# Patient Record
Sex: MALE | Race: BLACK OR AFRICAN AMERICAN | Employment: OTHER | URBAN - METROPOLITAN AREA
[De-identification: names, ages, dates, MRNs, and addresses within clinical notes are randomized per-mention and may not be internally consistent; named-entity substitution may affect disease eponyms.]

---

## 2018-02-08 ENCOUNTER — APPOINTMENT (OUTPATIENT)
Dept: GENERAL RADIOLOGY | Age: 83
DRG: 291 | End: 2018-02-08
Attending: EMERGENCY MEDICINE
Payer: MEDICARE

## 2018-02-08 ENCOUNTER — HOSPITAL ENCOUNTER (INPATIENT)
Age: 83
LOS: 5 days | Discharge: HOME HEALTH | DRG: 291 | End: 2018-02-13
Attending: EMERGENCY MEDICINE | Admitting: FAMILY MEDICINE
Payer: MEDICARE

## 2018-02-08 ENCOUNTER — APPOINTMENT (OUTPATIENT)
Dept: CT IMAGING | Age: 83
DRG: 291 | End: 2018-02-08
Attending: EMERGENCY MEDICINE
Payer: MEDICARE

## 2018-02-08 DIAGNOSIS — I50.9 ACUTE ON CHRONIC CONGESTIVE HEART FAILURE, UNSPECIFIED CONGESTIVE HEART FAILURE TYPE: Primary | ICD-10-CM

## 2018-02-08 DIAGNOSIS — R06.02 SOB (SHORTNESS OF BREATH): ICD-10-CM

## 2018-02-08 DIAGNOSIS — R53.81 PHYSICAL DEBILITY: ICD-10-CM

## 2018-02-08 DIAGNOSIS — Z71.89 GOALS OF CARE, COUNSELING/DISCUSSION: ICD-10-CM

## 2018-02-08 DIAGNOSIS — R41.3 MEMORY DIFFICULTIES: ICD-10-CM

## 2018-02-08 DIAGNOSIS — R05.9 COUGH: ICD-10-CM

## 2018-02-08 LAB
ALBUMIN SERPL-MCNC: 3.4 G/DL (ref 3.5–5)
ALBUMIN/GLOB SERPL: 0.8 {RATIO} (ref 1.1–2.2)
ALP SERPL-CCNC: 121 U/L (ref 45–117)
ALT SERPL-CCNC: 110 U/L (ref 12–78)
ANION GAP SERPL CALC-SCNC: 11 MMOL/L (ref 5–15)
ARTERIAL PATENCY WRIST A: ABNORMAL
AST SERPL-CCNC: 105 U/L (ref 15–37)
ATRIAL RATE: 120 BPM
ATRIAL RATE: 19 BPM
BASE EXCESS BLD CALC-SCNC: 4 MMOL/L
BASOPHILS # BLD: 0 K/UL (ref 0–0.1)
BASOPHILS NFR BLD: 0 % (ref 0–1)
BDY SITE: ABNORMAL
BILIRUB SERPL-MCNC: 1.5 MG/DL (ref 0.2–1)
BNP SERPL-MCNC: ABNORMAL PG/ML (ref 0–450)
BUN SERPL-MCNC: 28 MG/DL (ref 6–20)
BUN/CREAT SERPL: 18 (ref 12–20)
CALCIUM SERPL-MCNC: 8 MG/DL (ref 8.5–10.1)
CALCULATED R AXIS, ECG10: -20 DEGREES
CALCULATED R AXIS, ECG10: -21 DEGREES
CALCULATED T AXIS, ECG11: 102 DEGREES
CALCULATED T AXIS, ECG11: 126 DEGREES
CHLORIDE SERPL-SCNC: 106 MMOL/L (ref 97–108)
CHOLEST SERPL-MCNC: 116 MG/DL
CO2 SERPL-SCNC: 29 MMOL/L (ref 21–32)
CREAT SERPL-MCNC: 1.56 MG/DL (ref 0.7–1.3)
D DIMER PPP FEU-MCNC: 7.8 MG/L FEU (ref 0–0.65)
DIAGNOSIS, 93000: NORMAL
DIAGNOSIS, 93000: NORMAL
DIFFERENTIAL METHOD BLD: ABNORMAL
EOSINOPHIL # BLD: 0 K/UL (ref 0–0.4)
EOSINOPHIL NFR BLD: 0 % (ref 0–7)
ERYTHROCYTE [DISTWIDTH] IN BLOOD BY AUTOMATED COUNT: 16.6 % (ref 11.5–14.5)
FLUAV AG NPH QL IA: NEGATIVE
FLUBV AG NOSE QL IA: NEGATIVE
GAS FLOW.O2 O2 DELIVERY SYS: ABNORMAL L/MIN
GLOBULIN SER CALC-MCNC: 4.4 G/DL (ref 2–4)
GLUCOSE SERPL-MCNC: 112 MG/DL (ref 65–100)
HCO3 BLD-SCNC: 26.9 MMOL/L (ref 22–26)
HCT VFR BLD AUTO: 36.1 % (ref 36.6–50.3)
HDLC SERPL-MCNC: 35 MG/DL
HDLC SERPL: 3.3 {RATIO} (ref 0–5)
HGB BLD-MCNC: 11.9 G/DL (ref 12.1–17)
IMM GRANULOCYTES # BLD: 0.1 K/UL (ref 0–0.04)
IMM GRANULOCYTES NFR BLD AUTO: 1 % (ref 0–0.5)
LDLC SERPL CALC-MCNC: 63.6 MG/DL (ref 0–100)
LIPID PROFILE,FLP: NORMAL
LYMPHOCYTES # BLD: 0.7 K/UL (ref 0.8–3.5)
LYMPHOCYTES NFR BLD: 9 % (ref 12–49)
MAGNESIUM SERPL-MCNC: 1.7 MG/DL (ref 1.6–2.4)
MAGNESIUM SERPL-MCNC: 2 MG/DL (ref 1.6–2.4)
MCH RBC QN AUTO: 32.7 PG (ref 26–34)
MCHC RBC AUTO-ENTMCNC: 33 G/DL (ref 30–36.5)
MCV RBC AUTO: 99.2 FL (ref 80–99)
MONOCYTES # BLD: 0.7 K/UL (ref 0–1)
MONOCYTES NFR BLD: 9 % (ref 5–13)
NEUTS SEG # BLD: 6.3 K/UL (ref 1.8–8)
NEUTS SEG NFR BLD: 81 % (ref 32–75)
NRBC # BLD: 0.04 K/UL (ref 0–0.01)
NRBC BLD-RTO: 0.5 PER 100 WBC
PCO2 BLD: 33.1 MMHG (ref 35–45)
PH BLD: 7.52 [PH] (ref 7.35–7.45)
PLATELET # BLD AUTO: 175 K/UL (ref 150–400)
PLATELET COMMENTS,PCOM: ABNORMAL
PMV BLD AUTO: 12.3 FL (ref 8.9–12.9)
PO2 BLD: 66 MMHG (ref 80–100)
POTASSIUM SERPL-SCNC: 3.2 MMOL/L (ref 3.5–5.1)
PROT SERPL-MCNC: 7.8 G/DL (ref 6.4–8.2)
Q-T INTERVAL, ECG07: 296 MS
Q-T INTERVAL, ECG07: 336 MS
QRS DURATION, ECG06: 74 MS
QRS DURATION, ECG06: 78 MS
QTC CALCULATION (BEZET), ECG08: 416 MS
QTC CALCULATION (BEZET), ECG08: 478 MS
RBC # BLD AUTO: 3.64 M/UL (ref 4.1–5.7)
RBC MORPH BLD: ABNORMAL
SAO2 % BLD: 95 % (ref 92–97)
SODIUM SERPL-SCNC: 146 MMOL/L (ref 136–145)
SPECIMEN TYPE: ABNORMAL
TRIGL SERPL-MCNC: 87 MG/DL (ref ?–150)
TROPONIN I SERPL-MCNC: 0.1 NG/ML
TROPONIN I SERPL-MCNC: 0.11 NG/ML
TROPONIN I SERPL-MCNC: 0.32 NG/ML
TSH SERPL DL<=0.05 MIU/L-ACNC: 0.81 UIU/ML (ref 0.36–3.74)
VENTRICULAR RATE, ECG03: 119 BPM
VENTRICULAR RATE, ECG03: 122 BPM
VLDLC SERPL CALC-MCNC: 17.4 MG/DL
WBC # BLD AUTO: 7.8 K/UL (ref 4.1–11.1)

## 2018-02-08 PROCEDURE — 76450000000

## 2018-02-08 PROCEDURE — 99285 EMERGENCY DEPT VISIT HI MDM: CPT

## 2018-02-08 PROCEDURE — 83735 ASSAY OF MAGNESIUM: CPT | Performed by: FAMILY MEDICINE

## 2018-02-08 PROCEDURE — 74011250636 HC RX REV CODE- 250/636: Performed by: EMERGENCY MEDICINE

## 2018-02-08 PROCEDURE — 74011000250 HC RX REV CODE- 250: Performed by: EMERGENCY MEDICINE

## 2018-02-08 PROCEDURE — 71045 X-RAY EXAM CHEST 1 VIEW: CPT

## 2018-02-08 PROCEDURE — 94640 AIRWAY INHALATION TREATMENT: CPT

## 2018-02-08 PROCEDURE — 80053 COMPREHEN METABOLIC PANEL: CPT | Performed by: EMERGENCY MEDICINE

## 2018-02-08 PROCEDURE — 74011250637 HC RX REV CODE- 250/637: Performed by: NURSE PRACTITIONER

## 2018-02-08 PROCEDURE — 74011000258 HC RX REV CODE- 258: Performed by: NURSE PRACTITIONER

## 2018-02-08 PROCEDURE — 74011000250 HC RX REV CODE- 250: Performed by: NURSE PRACTITIONER

## 2018-02-08 PROCEDURE — 83880 ASSAY OF NATRIURETIC PEPTIDE: CPT | Performed by: EMERGENCY MEDICINE

## 2018-02-08 PROCEDURE — 74011250636 HC RX REV CODE- 250/636: Performed by: NURSE PRACTITIONER

## 2018-02-08 PROCEDURE — 85379 FIBRIN DEGRADATION QUANT: CPT | Performed by: EMERGENCY MEDICINE

## 2018-02-08 PROCEDURE — 84443 ASSAY THYROID STIM HORMONE: CPT | Performed by: FAMILY MEDICINE

## 2018-02-08 PROCEDURE — 80061 LIPID PANEL: CPT | Performed by: FAMILY MEDICINE

## 2018-02-08 PROCEDURE — 84484 ASSAY OF TROPONIN QUANT: CPT | Performed by: EMERGENCY MEDICINE

## 2018-02-08 PROCEDURE — 96374 THER/PROPH/DIAG INJ IV PUSH: CPT

## 2018-02-08 PROCEDURE — 82803 BLOOD GASES ANY COMBINATION: CPT

## 2018-02-08 PROCEDURE — 36600 WITHDRAWAL OF ARTERIAL BLOOD: CPT

## 2018-02-08 PROCEDURE — 83735 ASSAY OF MAGNESIUM: CPT | Performed by: NURSE PRACTITIONER

## 2018-02-08 PROCEDURE — 93005 ELECTROCARDIOGRAM TRACING: CPT

## 2018-02-08 PROCEDURE — 65660000000 HC RM CCU STEPDOWN

## 2018-02-08 PROCEDURE — 70450 CT HEAD/BRAIN W/O DYE: CPT

## 2018-02-08 PROCEDURE — 36415 COLL VENOUS BLD VENIPUNCTURE: CPT | Performed by: FAMILY MEDICINE

## 2018-02-08 PROCEDURE — 93306 TTE W/DOPPLER COMPLETE: CPT

## 2018-02-08 PROCEDURE — 77030029684 HC NEB SM VOL KT MONA -A

## 2018-02-08 PROCEDURE — 85025 COMPLETE CBC W/AUTO DIFF WBC: CPT | Performed by: EMERGENCY MEDICINE

## 2018-02-08 PROCEDURE — 87804 INFLUENZA ASSAY W/OPTIC: CPT | Performed by: FAMILY MEDICINE

## 2018-02-08 PROCEDURE — 74011000250 HC RX REV CODE- 250: Performed by: FAMILY MEDICINE

## 2018-02-08 RX ORDER — IPRATROPIUM BROMIDE AND ALBUTEROL SULFATE 2.5; .5 MG/3ML; MG/3ML
3 SOLUTION RESPIRATORY (INHALATION)
Status: DISCONTINUED | OUTPATIENT
Start: 2018-02-08 | End: 2018-02-11

## 2018-02-08 RX ORDER — SODIUM CHLORIDE 0.9 % (FLUSH) 0.9 %
5-10 SYRINGE (ML) INJECTION AS NEEDED
Status: DISCONTINUED | OUTPATIENT
Start: 2018-02-08 | End: 2018-02-13 | Stop reason: HOSPADM

## 2018-02-08 RX ORDER — GUAIFENESIN 100 MG/5ML
81 LIQUID (ML) ORAL DAILY
COMMUNITY

## 2018-02-08 RX ORDER — LANOLIN ALCOHOL/MO/W.PET/CERES
1000 CREAM (GRAM) TOPICAL DAILY
COMMUNITY

## 2018-02-08 RX ORDER — ATORVASTATIN CALCIUM 80 MG/1
40 TABLET, FILM COATED ORAL DAILY
COMMUNITY

## 2018-02-08 RX ORDER — FINASTERIDE 5 MG/1
5 TABLET, FILM COATED ORAL DAILY
COMMUNITY

## 2018-02-08 RX ORDER — FUROSEMIDE 10 MG/ML
80 INJECTION INTRAMUSCULAR; INTRAVENOUS
Status: COMPLETED | OUTPATIENT
Start: 2018-02-08 | End: 2018-02-08

## 2018-02-08 RX ORDER — CARVEDILOL 6.25 MG/1
6.25 TABLET ORAL 2 TIMES DAILY
Status: DISCONTINUED | OUTPATIENT
Start: 2018-02-08 | End: 2018-02-09

## 2018-02-08 RX ORDER — LISINOPRIL 10 MG/1
10 TABLET ORAL DAILY
COMMUNITY
End: 2018-02-13

## 2018-02-08 RX ORDER — TAMSULOSIN HYDROCHLORIDE 0.4 MG/1
0.4 CAPSULE ORAL DAILY
COMMUNITY

## 2018-02-08 RX ORDER — FINASTERIDE 5 MG/1
5 TABLET, FILM COATED ORAL DAILY
Status: DISCONTINUED | OUTPATIENT
Start: 2018-02-09 | End: 2018-02-13 | Stop reason: HOSPADM

## 2018-02-08 RX ORDER — METOPROLOL SUCCINATE 25 MG/1
25 TABLET, EXTENDED RELEASE ORAL DAILY
Status: ON HOLD | COMMUNITY
End: 2018-02-13

## 2018-02-08 RX ORDER — FUROSEMIDE 40 MG/1
60 TABLET ORAL 2 TIMES DAILY
COMMUNITY
End: 2018-02-13

## 2018-02-08 RX ORDER — SODIUM CHLORIDE 0.9 % (FLUSH) 0.9 %
5-10 SYRINGE (ML) INJECTION EVERY 8 HOURS
Status: DISCONTINUED | OUTPATIENT
Start: 2018-02-08 | End: 2018-02-13 | Stop reason: HOSPADM

## 2018-02-08 RX ORDER — ATORVASTATIN CALCIUM 40 MG/1
40 TABLET, FILM COATED ORAL DAILY
Status: DISCONTINUED | OUTPATIENT
Start: 2018-02-09 | End: 2018-02-13 | Stop reason: HOSPADM

## 2018-02-08 RX ORDER — POTASSIUM CHLORIDE 750 MG/1
20 TABLET, FILM COATED, EXTENDED RELEASE ORAL ONCE
Status: COMPLETED | OUTPATIENT
Start: 2018-02-08 | End: 2018-02-08

## 2018-02-08 RX ORDER — MAGNESIUM SULFATE HEPTAHYDRATE 40 MG/ML
2 INJECTION, SOLUTION INTRAVENOUS ONCE
Status: COMPLETED | OUTPATIENT
Start: 2018-02-08 | End: 2018-02-08

## 2018-02-08 RX ORDER — TAMSULOSIN HYDROCHLORIDE 0.4 MG/1
0.4 CAPSULE ORAL DAILY
Status: DISCONTINUED | OUTPATIENT
Start: 2018-02-09 | End: 2018-02-13 | Stop reason: HOSPADM

## 2018-02-08 RX ORDER — ACETAMINOPHEN 325 MG/1
650 TABLET ORAL
Status: DISCONTINUED | OUTPATIENT
Start: 2018-02-08 | End: 2018-02-13 | Stop reason: HOSPADM

## 2018-02-08 RX ORDER — POTASSIUM CHLORIDE 750 MG/1
20 TABLET, FILM COATED, EXTENDED RELEASE ORAL
Status: COMPLETED | OUTPATIENT
Start: 2018-02-08 | End: 2018-02-08

## 2018-02-08 RX ORDER — GUAIFENESIN 100 MG/5ML
81 LIQUID (ML) ORAL DAILY
Status: DISCONTINUED | OUTPATIENT
Start: 2018-02-09 | End: 2018-02-13 | Stop reason: HOSPADM

## 2018-02-08 RX ORDER — BUMETANIDE 0.25 MG/ML
2 INJECTION INTRAMUSCULAR; INTRAVENOUS EVERY 12 HOURS
Status: DISCONTINUED | OUTPATIENT
Start: 2018-02-08 | End: 2018-02-10

## 2018-02-08 RX ORDER — LOSARTAN POTASSIUM 50 MG/1
50 TABLET ORAL DAILY
Status: DISCONTINUED | OUTPATIENT
Start: 2018-02-08 | End: 2018-02-09

## 2018-02-08 RX ORDER — NITROGLYCERIN 0.4 MG/1
0.4 TABLET SUBLINGUAL
Status: DISCONTINUED | OUTPATIENT
Start: 2018-02-08 | End: 2018-02-13 | Stop reason: HOSPADM

## 2018-02-08 RX ADMIN — MAGNESIUM SULFATE HEPTAHYDRATE 2 G: 40 INJECTION, SOLUTION INTRAVENOUS at 15:32

## 2018-02-08 RX ADMIN — FUROSEMIDE 80 MG: 10 INJECTION, SOLUTION INTRAVENOUS at 12:59

## 2018-02-08 RX ADMIN — ALBUTEROL SULFATE 1 DOSE: 2.5 SOLUTION RESPIRATORY (INHALATION) at 12:03

## 2018-02-08 RX ADMIN — DILTIAZEM HYDROCHLORIDE 10 MG/HR: 5 INJECTION INTRAVENOUS at 17:56

## 2018-02-08 RX ADMIN — POTASSIUM CHLORIDE 20 MEQ: 750 TABLET, FILM COATED, EXTENDED RELEASE ORAL at 15:32

## 2018-02-08 RX ADMIN — Medication 10 ML: at 22:00

## 2018-02-08 RX ADMIN — DILTIAZEM HYDROCHLORIDE 10 MG/HR: 5 INJECTION INTRAVENOUS at 15:25

## 2018-02-08 RX ADMIN — IPRATROPIUM BROMIDE AND ALBUTEROL SULFATE 3 ML: .5; 3 SOLUTION RESPIRATORY (INHALATION) at 20:52

## 2018-02-08 RX ADMIN — ALBUTEROL SULFATE 1 DOSE: 2.5 SOLUTION RESPIRATORY (INHALATION) at 12:04

## 2018-02-08 RX ADMIN — ALBUTEROL SULFATE 1 DOSE: 2.5 SOLUTION RESPIRATORY (INHALATION) at 11:28

## 2018-02-08 RX ADMIN — LOSARTAN POTASSIUM 50 MG: 50 TABLET ORAL at 15:32

## 2018-02-08 RX ADMIN — BUMETANIDE 2 MG: 0.25 INJECTION INTRAMUSCULAR; INTRAVENOUS at 20:36

## 2018-02-08 RX ADMIN — CARVEDILOL 6.25 MG: 3.12 TABLET, FILM COATED ORAL at 15:32

## 2018-02-08 RX ADMIN — POTASSIUM CHLORIDE 20 MEQ: 750 TABLET, EXTENDED RELEASE ORAL at 17:56

## 2018-02-08 NOTE — H&P
[address]  ACUTE CARE HISTORY AND PHYSICAL    Reva Mendoza  MR#: [id number]  : 1931  ACCOUNT #: [de-identified]   DATE OF SERVICE: 2018    CHIEF COMPLAINT:  Shortness of breath. HISTORY OF PRESENT ILLNESS:  The patient is an 80-year-old male with past medical history of CHF, hypertension, DVT, AAA, atherosclerosis, right inguinal hernia, cholelithiasis, BPH, who presents to the hospital complaining of the above-mentioned symptoms. The patient is a poor historian and not much history could be obtained from the patient. History relied on previous notes from [facility] as well as from the ER physician. Apparently the patient came in today complaining of shortness of breath that has gotten worse since last night. Per EMS note, the patient's wife called 911 due to complaints of chest pain and shortness of breath through the night. The patient was admitted at [facility] on 2018 and left AMA yesterday. The patient seemed more somnolent today per EMS, and the wife felt that the patient's symptoms had gotten worse since he came back from [facility]. The patient has been taking diuretics per the ER physician, but has not had much relief in shortness of breath. The patient is not on any anticoagulation per wife because apparently she did not want the patient to be anticoagulation. The patient is currently resting in chair. Denies any headache, blurry vision, sore throat, trouble swallowing, trouble with speech. Reports that the chest pain has gone away. Does admit to some shortness of breath. Denies cough. Does admit to orthopnea. Denies any abdominal pain, constipation, diarrhea, urinary symptoms, focal or generalized neurological weakness, recent travel, sick contacts or any other concerns or problems. The patient denies any hematemesis, melena or hemoptysis. The patient's last ejection fraction was around 25% on  done at [facility] this week. PAST MEDICAL HISTORY:  See above. HOME MEDICATIONS:  It is unclear what the patient's home medication is. Per the list at our facility, the patient is on aspirin 81 mg every day, Lipitor 80 mg every day, Proscar 5 mg every day, furosemide 40 mg every day, lisinopril 10 mg every day, metoprolol 25 mg every day, and tamsulosin 0.4 mg every day, but I am not sure if this is the correct list.  The patient does not remember his medication. SOCIAL HISTORY:  The patient denies any history of tobacco abuse, alcohol use, IV drug abuse. Lives at home. FAMILY HISTORY:  The patient reports that his mother had a history of heart problems. REVIEW OF SYSTEMS:  All systems were reviewed and were found to be essentially negative except for those symptoms mentioned above. ALLERGIES:  NO KNOWN DRUG ALLERGIES. CODE STATUS:  FULL. PHYSICAL EXAMINATION:  VITAL SIGNS:  Temperature 98.4, pulse 102, respiratory rate , blood pressure 125/50, . GENERAL:  Alert x2, awake, debilitated male, appears to be stated age. HEENT:  Pupils equal and reactive to light. Dry mucus membranes. Tympanic membranes clear. NECK:  Supple. CHEST:  Bilateral crackles, right greater than left. HEART:  Irregularly irregular, tachycardic. ABDOMEN:  Soft, nontender, nondistended. Bowel wounds are physiological.  EXTREMITIES:  No clubbing, no cyanosis. 1+ pitting edema bilateral lower extremities. NEUROPSYCHIATRIC:  Pleasant mood and affect. Cranial nerves II-XII appear to be grossly intact. Sensory grossly within normal limits. DTR 2+ . Strength 5/5. SKIN:  Warm. LABORATORY DATA:  White count 7.8, hemoglobin 11.9, hematocrit 36.1, platelets 395. Sodium, D-dimer 7.80, potassium 3.2, chloride 106, bicarb 29, anion gap 11, BUN 28, creatinine 1.56, calcium 8.0, bilirubin total 1.5. Troponin 0.10. , , bilirubin total 1.5. CT of the head shows no acute abnormality. EKG shows AFib with RVR, nonspecific ST changes.     ASSESSMENT AND PLAN:  1. CHF exacerbation. The patient will be admitted on a telemetry bed. Appreciate cardiology input. Cardiology started the patient on Bumex 2 mg IV q.12 hours, strict I's and O's, daily weights, nitroglycerin p.r.n., Coreg 6.25 mg b.i.d., and losartan. A repeat echocardiogram has been ordered by cardiology, which will be evaluated. Troponins have been ordered. Further intervention will be per hospital course. The patient is NYHA class 3-4 on admission. 2.  AFib with RVR. The patient on diltiazem drip. We will continue to closely monitor on telemetry bed. Coreg started. The patient needs oral anticoagulation, but the cardiologist spoke to the patient's wife and, per cardiology, she refused all anticoagulation. Currently on aspirin. Continue to closely monitor. 3.  Chest pain. Mildly elevated troponin, most likely secondary to demand ischemia along with AFib with RVR and elevated creatinine. We will trend. Continue aspirin and statin and continue to monitor. 4.  Hypertension. Continue home medication. 5.  CKD stage III. Monitor for now, especially in light of diuretics. 6.  History of AAA. Monitor. 7.  Hypokalemia. Replace potassium. 8.  Elevated liver enzymes, questionable cardiac cirrhosis. I will get a right upper quadrant ultrasound. The patient nontender in the right upper quadrant. No signs of liver disease. Continue to closely monitor. Repeat labs in the morning. 9.  GI and DVT prophylaxis. The patient will be on SCDs.       Duarte Henning MD MM / Kade Dunbar  D: 02/08/2018 16:53     T: 02/08/2018 17:47  JOB #: [id number]

## 2018-02-08 NOTE — PROGRESS NOTES
Admission Medication Reconciliation:    Information obtained from:  [facility], patient's wife, chart review    Comments/Recommendations: Updated PTA meds/reviewed patient's allergies. 1)  Medications added: aspirin, atorvastatin, cyanocobalamin, finasteride, furosemide, lisinopril, metoprolol succinate, MVI w/minerals, tamsulosin    2)  Medications deleted: none    3)  Medications changed: none    4)  Of note: patient medication list obtained from the [place]. Per his wife, he has not taken any medication since Tuesday. He was recently discharged from [address] [place] [address], but she did not have the discharge paperwork with her. Significant PMH/Disease States:   Past Medical History:   Diagnosis Date    Arthritis     Heart failure ([address]     Hypertension        Chief Complaint for this Admission:    Chief Complaint   Patient presents with    Chest Pain    Shortness of Breath       Allergies:  Review of patient's allergies indicates no known allergies. Prior to Admission Medications:   Prior to Admission Medications   Prescriptions Last Dose Informant Patient Reported? Taking? MULTIVIT WITH IRON,MINERALS (MULTIVITAMIN AND MINERALS PO) 2/6/2018  Yes Yes   Sig: Take 1 Tab by mouth daily. aspirin 81 mg chewable tablet 2/6/2018  Yes Yes   Sig: Take 81 mg by mouth daily. atorvastatin (LIPITOR) 80 mg tablet 2/6/2018  Yes Yes   Sig: Take 40 mg by mouth daily. cyanocobalamin 1,000 mcg tablet 2/6/2018  Yes Yes   Sig: Take 1,000 mcg by mouth daily. finasteride (PROSCAR) 5 mg tablet 2/6/2018  Yes Yes   Sig: Take 5 mg by mouth daily. furosemide (LASIX) 40 mg tablet 2/6/2018  Yes Yes   Sig: Take 60 mg by mouth two (2) times a day. lisinopril (PRINIVIL, ZESTRIL) 10 mg tablet 2/6/2018  Yes Yes   Sig: Take 10 mg by mouth daily. metoprolol succinate (TOPROL XL) 25 mg XL tablet 2/6/2018  Yes Yes   Sig: Take 25 mg by mouth daily. tamsulosin (FLOMAX) 0.4 mg capsule 2/6/2018  Yes Yes   Sig: Take 0.4 mg by mouth daily. Facility-Administered Medications: None     Thank you for allowing me to participate in the care of this patient. Please contact the medication reconciliation pharmacist () with any questions.       Brandon Huber, PharmD Candidate [id number]

## 2018-02-08 NOTE — IP AVS SNAPSHOT
[address] 
[phone] Patient: Carly Olson MRN: [id number] FDA:[id number]/07/3974 A check dana indicates which time of day the medication should be taken. My Medications START taking these medications Instructions Each Dose to Equal  
 Morning Noon Evening Bedtime  
 bumetanide 1 mg tablet Commonly known as:  [place] Sayirasema Your last dose was: Your next dose is: Take 1 Tab by mouth two (2) times a day. 1 mg  
    
   
   
   
  
 losartan 25 mg tablet Commonly known as:  COZAAR Start taking on:  2/14/2018 Your last dose was: Your next dose is: Take 0.5 Tabs by mouth daily. 12.5 mg  
    
   
   
   
  
  
CHANGE how you take these medications Instructions Each Dose to Equal  
 Morning Noon Evening Bedtime  
 metoprolol succinate 50 mg XL tablet Commonly known as:  TOPROL XL What changed:   
- medication strength 
- how much to take Your last dose was: Your next dose is: Take 1 Tab by mouth daily. 50 mg CONTINUE taking these medications Instructions Each Dose to Equal  
 Morning Noon Evening Bedtime  
 aspirin 81 mg chewable tablet Your last dose was: Your next dose is: Take 81 mg by mouth daily. 81 mg  
    
   
   
   
  
 atorvastatin 80 mg tablet Commonly known as:  LIPITOR Your last dose was: Your next dose is: Take 40 mg by mouth daily. 40 mg  
    
   
   
   
  
 cyanocobalamin 1,000 mcg tablet Your last dose was: Your next dose is: Take 1,000 mcg by mouth daily. 1000 mcg  
    
   
   
   
  
 finasteride 5 mg tablet Commonly known as:  PROSCAR Your last dose was: Your next dose is: Take 5 mg by mouth daily. 5 mg MULTIVITAMIN AND MINERALS PO Your last dose was: Your next dose is: Take 1 Tab by mouth daily. 1 Tab  
    
   
   
   
  
 tamsulosin 0.4 mg capsule Commonly known as:  FLOMAX Your last dose was: Your next dose is: Take 0.4 mg by mouth daily. 0.4 mg  
    
   
   
   
  
  
STOP taking these medications   
 furosemide 40 mg tablet Commonly known as:  LASIX  
   
  
 lisinopril 10 mg tablet Commonly known as:  Mohan Spencer Where to Get Your Medications Information on where to get these meds will be given to you by the nurse or doctor. ! Ask your nurse or doctor about these medications  
  bumetanide 1 mg tablet  
 losartan 25 mg tablet  
 metoprolol succinate 50 mg XL tablet

## 2018-02-08 NOTE — IP AVS SNAPSHOT
[address] 
[phone] Patient: Joselin Bobo MRN: [id number] RQC:[id number] About your hospitalization You were admitted on:  February 8, 2018 You last received care in the:  [facility] [place] [facility] CV SERVICES UNIT You were discharged on:  February 13, 2018 Why you were hospitalized Your primary diagnosis was:  Sob (Shortness Of Breath) Follow-up Information Follow up With Details Comments Contact Info Charley Villareal NP On 2/16/2018 11:20 AM [address] [place] [address] 
[phone] Dr Donny Alvarez at [facility] In 1 week Discharge follow up Phys Other, MD   Patient can only remember the practice name and not the physician [address] On 2/14/2018 reza hayward and PT/ [place] [address] 
[phone] Discharge Orders None A check dana indicates which time of day the medication should be taken. My Medications START taking these medications Instructions Each Dose to Equal  
 Morning Noon Evening Bedtime  
 bumetanide 1 mg tablet Commonly known as:  Leota Francisca Your last dose was: Your next dose is: Take 1 Tab by mouth two (2) times a day. 1 mg  
    
   
   
   
  
 losartan 25 mg tablet Commonly known as:  COZAAR Start taking on:  2/14/2018 Your last dose was: Your next dose is: Take 0.5 Tabs by mouth daily. 12.5 mg  
    
   
   
   
  
  
CHANGE how you take these medications Instructions Each Dose to Equal  
 Morning Noon Evening Bedtime  
 metoprolol succinate 50 mg XL tablet Commonly known as:  TOPROL XL What changed:   
- medication strength 
- how much to take Your last dose was: Your next dose is: Take 1 Tab by mouth daily.   
 50 mg  
    
   
   
   
  
  
 CONTINUE taking these medications Instructions Each Dose to Equal  
 Morning Noon Evening Bedtime  
 aspirin 81 mg chewable tablet Your last dose was: Your next dose is: Take 81 mg by mouth daily. 81 mg  
    
   
   
   
  
 atorvastatin 80 mg tablet Commonly known as:  LIPITOR Your last dose was: Your next dose is: Take 40 mg by mouth daily. 40 mg  
    
   
   
   
  
 cyanocobalamin 1,000 mcg tablet Your last dose was: Your next dose is: Take 1,000 mcg by mouth daily. 1000 mcg  
    
   
   
   
  
 finasteride 5 mg tablet Commonly known as:  PROSCAR Your last dose was: Your next dose is: Take 5 mg by mouth daily. 5 mg MULTIVITAMIN AND MINERALS PO Your last dose was: Your next dose is: Take 1 Tab by mouth daily. 1 Tab  
    
   
   
   
  
 tamsulosin 0.4 mg capsule Commonly known as:  FLOMAX Your last dose was: Your next dose is: Take 0.4 mg by mouth daily. 0.4 mg  
    
   
   
   
  
  
STOP taking these medications   
 furosemide 40 mg tablet Commonly known as:  LASIX  
   
  
 lisinopril 10 mg tablet Commonly known as:  Romy Burns Where to Get Your Medications Information on where to get these meds will be given to you by the nurse or doctor. ! Ask your nurse or doctor about these medications  
  bumetanide 1 mg tablet  
 losartan 25 mg tablet  
 metoprolol succinate 50 mg XL tablet Discharge Instructions Please bring this form with you to show your primary care provider at your follow-up appointment. Primary care provider:  Dr. Emma El MD 
 
Discharging provider:  Anmol Layne MD 
 
You have been admitted to the hospital with the following diagnoses: 
· SOB (shortness of breath) FOLLOW-UP CARE RECOMMENDATIONS: 
 
 APPOINTMENTS: 
Follow-up Information Follow up With Details Comments Contact Info Bary Libman, NP On 2/16/2018 11:20 AM [address] [facility] 
[phone] Dr Fozia Banks at [facility] In 1 week Discharge follow up FOLLOW-UP TESTS recommended: - Your Toprol XL increased to 25mg daily - Stop Lisinopril and Lasix 
- Starting Losartan and Bumex 
- follow up with Cardiologist as above PENDING TEST RESULTS: 
At the time of your discharge the following test results are still pending: none Please make sure you review these results with your outpatient follow-up provider(s). SYMPTOMS to watch for: chest pain, shortness of breath, fever, chills, nausea, vomiting, diarrhea, change in mentation, falling, weakness, bleeding. DIET/what to eat:  Cardiac Diet and Diabetic Diet ACTIVITY:  Activity as tolerated WOUND CARE: NONE 
 
EQUIPMENT needed:  NONE What to do if new or unexpected symptoms occur? If you experience any of the above symptoms (or should other concerns or questions arise after discharge) please call your primary care physician. Return to the emergency room if you cannot get hold of your doctor. · It is very important that you keep your follow-up appointment(s). · Please bring discharge papers, medication list (and/or medication bottles) to your follow-up appointments for review by your outpatient provider(s). · Please check the list of medications and be sure it includes every medication (even non-prescription medications) that your provider wants you to take. · It is important that you take the medication exactly as they are prescribed. · Keep your medication in the bottles provided by the pharmacist and keep a list of the medication names, dosages, and times to be taken in your wallet. · Do not take other medications without consulting your doctor. · If you have any questions about your medications or other instructions, please talk to your nurse or care provider before you leave the hospital. 
 
I understand that if any problems occur once I am at home I am to contact my physician. These instructions were explained to me and I had the opportunity to ask questions. [employer] Announcement We are excited to announce that we are making your provider's discharge notes available to you in [employer]. You will see these notes when they are completed and signed by the physician that discharged you from your recent hospital stay. If you have any questions or concerns about any information you see in [employer], please call the Health Information Department where you were seen or reach out to your Primary Care Provider for more information about your plan of care. Introducing [facility]! [facility] introduces [employer] patient portal. Now you can access parts of your medical record, email your doctor's office, and request medication refills online. 1. In your internet browser, go to [email or web address] 2. Click on the First Time User? Click Here link in the Sign In box. You will see the New Member Sign Up page. 3. Enter your [employer] Access Code exactly as it appears below. You will not need to use this code after youve completed the sign-up process. If you do not sign up before the expiration date, you must request a new code. · [employer] Access Code: [email or web address] Expires: 5/10/2018  1:44 PM 
 
4. Enter the last four digits of your Social Security Number (xxxx) and Date of Birth (mm/dd/yyyy) as indicated and click Submit. You will be taken to the next sign-up page. 5. Create a [email or web address] ID. This will be your [employer] login ID and cannot be changed, so think of one that is secure and easy to remember. 6. Create a [employer] password. You can change your password at any time. 7. Enter your Password Reset Question and Answer. This can be used at a later time if you forget your password. 8. Enter your e-mail address. You will receive e-mail notification when new information is available in [address] 9. Click Sign Up. You can now view and download portions of your medical record. 10. Click the Download Summary menu link to download a portable copy of your medical information. If you have questions, please visit the Frequently Asked Questions section of the [employer] website. Remember, [employer] is NOT to be used for urgent needs. For medical emergencies, dial 911. Now available from your iPhone and Android! Providers Seen During Your Hospitalization Provider Specialty Primary office phone Trae Booker MD Emergency Medicine [phone] Jhony Quintanilla MD Hospitalist [phone] Magdaleno León MD Internal Medicine [phone] Alejandra Reaves MD Internal Medicine [phone] Immunizations Administered for This Admission Name Date Influenza Vaccine (Quad) PF 2/13/2018 Your Primary Care Physician (PCP) Primary Care Physician Office Phone Office Fax OTHER, PHYS ** None ** ** None ** You are allergic to the following No active allergies Recent Documentation Height Weight BMI Smoking Status 1.753 m 70.3 kg 22.89 kg/m2 Former Smoker Emergency Contacts Name Discharge Info Relation Home Work Mobile Darlys Fothergill [1] Spouse [3] [phone] Patient Belongings The following personal items are in your possession at time of discharge: 
  Dental Appliances: Uppers, Lowers, With patient  Visual Aid: Glasses, At home      Home Medications: None   Jewelry: None  Clothing: At bedside    Other Valuables: None Discharge Instructions Attachments/References HEART FAILURE: AVOIDING TRIGGERS (ENGLISH) Patient Handouts Avoiding Triggers With Heart Failure: Care Instructions Your Care Instructions Triggers are anything that make your heart failure flare up. A flare-up is also called \"sudden heart failure\" or \"acute heart failure. \" When you have a flare-up, fluid builds up in your lungs, and you have problems breathing. You might need to go to the hospital. By watching for changes in your condition and avoiding triggers, you can prevent heart failure flare-ups. Follow-up care is a key part of your treatment and safety. Be sure to make and go to all appointments, and call your doctor if you are having problems. It's also a good idea to know your test results and keep a list of the medicines you take. How can you care for yourself at home? Watch for changes in your weight and condition · Weigh yourself without clothing at the same time each day. Record your weight. Call your doctor if you have sudden weight gain, such as more than 2 to 3 pounds in a day or 5 pounds in a week. (Your doctor may suggest a different range of weight gain.) A sudden weight gain may mean that your heart failure is getting worse. · Keep a daily record of your symptoms. Write down any changes in how you feel, such as new shortness of breath, cough, or problems eating. Also record if your ankles are more swollen than usual and if you feel more tired than usual. Note anything that you ate or did that could have triggered these changes. Limit sodium Sodium causes your body to hold on to extra water. This may cause your heart failure symptoms to get worse. People get most of their sodium from processed foods. Fast food and restaurant meals also tend to be very high in sodium. · Your doctor may suggest that you limit sodium to 2,000 milligrams (mg) a day or less. That is less than 1 teaspoon of salt a day, including all the salt you eat in cooking or in packaged foods. · Read food labels on cans and food packages.  They tell you how much sodium you get in one serving. Check the serving size. If you eat more than one serving, you are getting more sodium. · Be aware that sodium can come in forms other than salt, including monosodium glutamate (MSG), sodium citrate, and sodium bicarbonate (baking soda). MSG is often added to Asian food. You can sometimes ask for food without MSG or salt. · Slowly reducing salt will help you adjust to the taste. Take the salt shaker off the table. · Flavor your food with garlic, lemon juice, onion, vinegar, herbs, and spices instead of salt. Do not use soy sauce, steak sauce, onion salt, garlic salt, mustard, or ketchup on your food, unless it is labeled \"low-sodium\" or \"low-salt. \" 
· Make your own salad dressings, sauces, and ketchup without adding salt. · Use fresh or frozen ingredients, instead of canned ones, whenever you can. Choose low-sodium canned goods. · Eat less processed food and food from restaurants, including fast food. Exercise as directed Moderate, regular exercise is very good for your heart. It improves your blood flow and helps control your weight. But too much exercise can stress your heart and cause a heart failure flare-up. · Check with your doctor before you start an exercise program. 
· Walking is an easy way to get exercise. Start out slowly. Gradually increase the length and pace of your walk. Swimming, riding a bike, and using a treadmill are also good forms of exercise. · When you exercise, watch for signs that your heart is working too hard. You are pushing yourself too hard if you cannot talk while you are exercising. If you become short of breath or dizzy or have chest pain, stop, sit down, and rest. 
· Do not exercise when you do not feel well. Take medicines correctly · Take your medicines exactly as prescribed. Call your doctor if you think you are having a problem with your medicine. · Make a list of all the medicines you take.  Include those prescribed to you by other doctors and any over-the-counter medicines, vitamins, or supplements you take. Take this list with you when you go to any doctor. · Take your medicines at the same time every day. It may help you to post a list of all the medicines you take every day and what time of day you take them. · Make taking your medicine as simple as you can. Plan times to take your medicines when you are doing other things, such as eating a meal or getting ready for bed. This will make it easier to remember to take your medicines. · Get organized. Use helpful tools, such as daily or weekly pill containers. When should you call for help? Call 911 if you have symptoms of sudden heart failure such as: 
? · You have severe trouble breathing. ? · You cough up pink, foamy mucus. ? · You have a new irregular or rapid heartbeat. ?Call your doctor now or seek immediate medical care if: 
? · You have new or increased shortness of breath. ? · You are dizzy or lightheaded, or you feel like you may faint. ? · You have sudden weight gain, such as more than 2 to 3 pounds in a day or 5 pounds in a week. (Your doctor may suggest a different range of weight gain.) ? · You have increased swelling in your legs, ankles, or feet. ? · You are suddenly so tired or weak that you cannot do your usual activities. ? Watch closely for changes in your health, and be sure to contact your doctor if you develop new symptoms. Where can you learn more? Go to http://shayne-jeet.info/. Enter P592 in the search box to learn more about \"Avoiding Triggers With Heart Failure: Care Instructions. \" Current as of: September 21, 2016 Content Version: 11.4 © 4403-7000 [employer]. Care instructions adapted under license by [employer] (which disclaims liability or warranty for this information).  If you have questions about a medical condition or this instruction, always ask your healthcare professional. [address] any warranty or liability for your use of this information. Please provide this summary of care documentation to your next provider. Signatures-by signing, you are acknowledging that this After Visit Summary has been reviewed with you and you have received a copy. Patient Signature:  ____________________________________________________________ Date:  ____________________________________________________________  
  
Rembrandt Favorite Provider Signature:  ____________________________________________________________ Date:  ____________________________________________________________

## 2018-02-08 NOTE — ED TRIAGE NOTES
Pt's wife states that the pt had chest pain and SOB all night and states he has continued to have difficulty breathing since his discharge from [address] [place] [address] yesterday after being there since Sunday with his cough becoming worse and he has become more lethargic. When EMS arrived breathing was labored and his wife stated that the pt was having chest pain. Pt states upon arrival he has pain across his mid back. Pt has expiratory wheezing throughout.

## 2018-02-08 NOTE — PROGRESS NOTES
1550 TRANSFER - IN REPORT:    Verbal report received from JENNIFER Chance (name) on Stephen Leary  being received from ED (unit) for routine progression of care      Report consisted of patients Situation, Background, Assessment and   Recommendations(SBAR). Information from the following report(s) SBAR, ED Summary and Cardiac Rhythm a-fib was reviewed with the receiving nurse. Opportunity for questions and clarification was provided. Assessment completed upon patients arrival to unit and care assumed. Patient settled in bed. Cardizem drip running. Patient has barking cough and rhonchi noted in R and L lungs. Patient is lethargic with some confusion. He is able to state his name and birthday, but he is aggressive in his speech toward doctor and nurses, and does not know why he is here at the hospital.     1930 Bedside shift change report given to Ja Angelo RN (oncoming nurse) by Lynn Harding RN (offgoing nurse). Report included the following information SBAR, Intake/Output, MAR and Cardiac Rhythm a-fib.

## 2018-02-08 NOTE — CONSULTS
PALLIATIVE MEDICINE          Consult noted and appreciated. We will see the patient 2/9/18. Thank you. Pushpa Mann.  [address] MSN, FNP-BC, [facility]

## 2018-02-08 NOTE — ROUTINE PROCESS
TRANSFER - OUT REPORT:    Verbal report given to JENNIFER Patino(name) on Anel Webb  being transferred to [facility](unit) for routine progression of care       Report consisted of patients Situation, Background, Assessment and   Recommendations(SBAR). Information from the following report(s) SBAR and Kardex was reviewed with the receiving nurse. Lines:   Peripheral IV 02/08/18 Right;Upper Arm (Active)   Site Assessment Clean, dry, & intact 2/8/2018  1:00 PM   Phlebitis Assessment 0 2/8/2018  1:00 PM   Infiltration Assessment 0 2/8/2018  1:00 PM   Dressing Status Clean, dry, & intact 2/8/2018  1:00 PM   Dressing Type Tape;Transparent 2/8/2018  1:00 PM   Hub Color/Line Status Pink;Capped;Flushed;Patent 2/8/2018  1:00 PM   Action Taken Blood drawn 2/8/2018  1:00 PM        Opportunity for questions and clarification was provided.       Patient transported with:   [employer]

## 2018-02-08 NOTE — ED PROVIDER NOTES
HPI Comments: 80 y.o. male with past medical history significant for atrial fib and CHF who presents from home via EMS with chief complaint of shortness of breath. Per EMS, patient's wife called 911 due to complaints of chest pain and shortness of breath during the night. EMS reports patient was transported to [facility] 4 days ago and discharged yesterday. Patient seems more somnolent today, according to EMS, and wife feels that patient's symptoms have gotten worse since discharge from [facility]. Patient currently complains of back pain. He is taking his diuretics and producing a lot of urine. Patient does not use inhalers or nebulizer at home. There are no other acute medical concerns at this time. Social hx: Lives at home with wife. Patient normally goes to [facility] but they are on diversion. PCP: No primary care provider on file. Note written by Glory Jarrett, as dictated by Kimberly Guerin MD 11:13 AM      The history is provided by the patient, the spouse and the EMS personnel. The history is limited by the condition of the patient. No past medical history on file. No past surgical history on file. No family history on file. Social History     Social History    Marital status: N/A     Spouse name: N/A    Number of children: N/A    Years of education: N/A     Occupational History    Not on file. Social History Main Topics    Smoking status: Not on file    Smokeless tobacco: Not on file    Alcohol use Not on file    Drug use: Not on file    Sexual activity: Not on file     Other Topics Concern    Not on file     Social History Narrative         ALLERGIES: Review of patient's allergies indicates not on file. Review of Systems   Constitutional: Negative for activity change, chills and fever. HENT: Negative for nosebleeds, sore throat, trouble swallowing and voice change. Eyes: Negative for visual disturbance. Respiratory: Positive for shortness of breath. Cardiovascular: Positive for chest pain. Negative for palpitations. Gastrointestinal: Negative for abdominal pain, constipation, diarrhea and nausea. Genitourinary: Negative for difficulty urinating, dysuria, hematuria and urgency. Musculoskeletal: Positive for back pain. Negative for neck pain and neck stiffness. Skin: Negative for color change. Allergic/Immunologic: Negative for immunocompromised state. Neurological: Negative for dizziness, seizures, syncope, weakness, light-headedness, numbness and headaches. Psychiatric/Behavioral: Negative for behavioral problems, confusion, hallucinations, self-injury and suicidal ideas. All other systems reviewed and are negative. Vitals:    02/08/18 1239 02/08/18 1245 02/08/18 1259 02/08/18 1300   BP: 124/84 114/87 109/73 109/73   Pulse: (!) 134 (!) 134 (!) 134 (!) 134   Resp: 18 25  16   SpO2: 97% 98%  100%   Weight:       Height:                Physical Exam   Constitutional: He is oriented to person, place, and time. He appears well-developed and well-nourished. No distress. HENT:   Head: Normocephalic and atraumatic. Eyes: Pupils are equal, round, and reactive to light. Neck: Normal range of motion. Neck supple. Cardiovascular: Normal rate, regular rhythm and normal heart sounds. Exam reveals no gallop and no friction rub. No murmur heard. Pulmonary/Chest: Effort normal. No respiratory distress. He has decreased breath sounds (bilateral bases). He has wheezes. He has rhonchi. He has rales. Abdominal: Soft. Bowel sounds are normal. He exhibits no distension. There is no tenderness. There is no rebound and no guarding. Musculoskeletal: Normal range of motion. Neurological: He is alert and oriented to person, place, and time. Skin: Skin is warm. No rash noted. He is not diaphoretic. Psychiatric: He has a normal mood and affect. His behavior is normal. Judgment and thought content normal.   Nursing note and vitals reviewed.   Note written by Juan Herrera, as dictated by Chata Simon MD 11:11 AM       [facility]      ED Course     This is an 80year old male with past medical history, review of systems, physical exam as above, presenting with complaints of dyspnea, chest pain, and context of a history of A. fib, congestive heart failure, and recent discharge from an outside hospital. Wife also present, states patient was complaining of chest pain all night long. He denies fevers, chills, nausea or vomiting. Power of the patient is awake, alert, noted to be tachycardic, afebrile, with diminished breath sounds at the bases, wheezing at the apices, concern for pulmonary edema. Differential includes acute heart failure, chronic heart failure, pneumonia, viral URI. Plan to obtain CMP, CBC, chest x-ray, BMP, cardiac enzymes, we will provide stacked nebulizers, diuretics, oxygen as needed. We will make a disposition based on the patient's diagnostics and response to therapy, however given his comorbidities and presentation is likely require admission for further care and evaluation. Procedures      ED EKG interpretation:  Rhythm: atrial fib; and irregular. Rate (approx.): 119; ST/T wave: depression in lateral leads, no elevation. No previous EKG for comparison. Note written by Juan Herrera, as dictated by Chata Simon MD 11:46 AM    1:30 PM  Patient was discharged from [facility] yesterday after admission for CHF exacerbation. EF 25%, severe MR and TR. Hospitalized 1 month ago at [facility] for CAP. Discharged yesterday with increase in metoprolol and 60 mg Lasix bid. He required dobutamine drip for short period of time during recent admission.     2:53 PM  Yanni Velasquez NP (cardiology) has seen and evaluated patient, and discussed with Dr. Manny Senior - recommend admission to the hospitalist.    CONSULT NOTE:  3:14 PM Chata Simon MD spoke with Dr. Ha Rajan, Consult for Hospitalist.  Discussed available diagnostic tests and clinical findings. He is in agreement with care plans as outlined. Dr. Anna Guadalupe will admit patient.

## 2018-02-09 ENCOUNTER — APPOINTMENT (OUTPATIENT)
Dept: NUCLEAR MEDICINE | Age: 83
DRG: 291 | End: 2018-02-09
Attending: FAMILY MEDICINE
Payer: MEDICARE

## 2018-02-09 ENCOUNTER — APPOINTMENT (OUTPATIENT)
Dept: ULTRASOUND IMAGING | Age: 83
DRG: 291 | End: 2018-02-09
Attending: FAMILY MEDICINE
Payer: MEDICARE

## 2018-02-09 LAB
ANION GAP SERPL CALC-SCNC: 9 MMOL/L (ref 5–15)
APTT PPP: 34.8 SEC (ref 22.1–32.5)
APTT PPP: 42.7 SEC (ref 22.1–32.5)
BASOPHILS # BLD: 0.1 K/UL (ref 0–0.1)
BASOPHILS # BLD: 0.1 K/UL (ref 0–0.1)
BASOPHILS NFR BLD: 1 % (ref 0–1)
BASOPHILS NFR BLD: 1 % (ref 0–1)
BUN SERPL-MCNC: 33 MG/DL (ref 6–20)
BUN/CREAT SERPL: 21 (ref 12–20)
CALCIUM SERPL-MCNC: 7.9 MG/DL (ref 8.5–10.1)
CHLORIDE SERPL-SCNC: 106 MMOL/L (ref 97–108)
CO2 SERPL-SCNC: 30 MMOL/L (ref 21–32)
CREAT SERPL-MCNC: 1.6 MG/DL (ref 0.7–1.3)
DIFFERENTIAL METHOD BLD: ABNORMAL
DIFFERENTIAL METHOD BLD: ABNORMAL
EOSINOPHIL # BLD: 0 K/UL (ref 0–0.4)
EOSINOPHIL # BLD: 0 K/UL (ref 0–0.4)
EOSINOPHIL NFR BLD: 0 % (ref 0–7)
EOSINOPHIL NFR BLD: 0 % (ref 0–7)
ERYTHROCYTE [DISTWIDTH] IN BLOOD BY AUTOMATED COUNT: 16.8 % (ref 11.5–14.5)
ERYTHROCYTE [DISTWIDTH] IN BLOOD BY AUTOMATED COUNT: 16.8 % (ref 11.5–14.5)
GLUCOSE SERPL-MCNC: 107 MG/DL (ref 65–100)
HCT VFR BLD AUTO: 33.1 % (ref 36.6–50.3)
HCT VFR BLD AUTO: 33.7 % (ref 36.6–50.3)
HGB BLD-MCNC: 10.6 G/DL (ref 12.1–17)
HGB BLD-MCNC: 11 G/DL (ref 12.1–17)
IMM GRANULOCYTES # BLD: 0 K/UL
IMM GRANULOCYTES # BLD: 0 K/UL (ref 0–0.04)
IMM GRANULOCYTES NFR BLD AUTO: 0 %
IMM GRANULOCYTES NFR BLD AUTO: 0 % (ref 0–0.5)
LYMPHOCYTES # BLD: 0.5 K/UL (ref 0.8–3.5)
LYMPHOCYTES # BLD: 0.6 K/UL (ref 0.8–3.5)
LYMPHOCYTES NFR BLD: 8 % (ref 12–49)
LYMPHOCYTES NFR BLD: 8 % (ref 12–49)
MAGNESIUM SERPL-MCNC: 2 MG/DL (ref 1.6–2.4)
MCH RBC QN AUTO: 31.6 PG (ref 26–34)
MCH RBC QN AUTO: 32.4 PG (ref 26–34)
MCHC RBC AUTO-ENTMCNC: 32 G/DL (ref 30–36.5)
MCHC RBC AUTO-ENTMCNC: 32.6 G/DL (ref 30–36.5)
MCV RBC AUTO: 98.8 FL (ref 80–99)
MCV RBC AUTO: 99.4 FL (ref 80–99)
MONOCYTES # BLD: 0.6 K/UL (ref 0–1)
MONOCYTES # BLD: 0.6 K/UL (ref 0–1)
MONOCYTES NFR BLD: 10 % (ref 5–13)
MONOCYTES NFR BLD: 9 % (ref 5–13)
NEUTS BAND NFR BLD MANUAL: 1 % (ref 0–6)
NEUTS SEG # BLD: 5.1 K/UL (ref 1.8–8)
NEUTS SEG # BLD: 5.8 K/UL (ref 1.8–8)
NEUTS SEG NFR BLD: 81 % (ref 32–75)
NEUTS SEG NFR BLD: 81 % (ref 32–75)
NRBC # BLD: 0.02 K/UL (ref 0–0.01)
NRBC # BLD: 0.06 K/UL (ref 0–0.01)
NRBC BLD-RTO: 0.3 PER 100 WBC
NRBC BLD-RTO: 0.8 PER 100 WBC
PLATELET # BLD AUTO: 138 K/UL (ref 150–400)
PLATELET # BLD AUTO: 178 K/UL (ref 150–400)
PLATELET COMMENTS,PCOM: ABNORMAL
PMV BLD AUTO: 12.5 FL (ref 8.9–12.9)
PMV BLD AUTO: 12.7 FL (ref 8.9–12.9)
POTASSIUM SERPL-SCNC: 4 MMOL/L (ref 3.5–5.1)
RBC # BLD AUTO: 3.35 M/UL (ref 4.1–5.7)
RBC # BLD AUTO: 3.39 M/UL (ref 4.1–5.7)
RBC MORPH BLD: ABNORMAL
SODIUM SERPL-SCNC: 145 MMOL/L (ref 136–145)
THERAPEUTIC RANGE,PTTT: ABNORMAL SECS (ref 58–77)
THERAPEUTIC RANGE,PTTT: ABNORMAL SECS (ref 58–77)
WBC # BLD AUTO: 6.3 K/UL (ref 4.1–11.1)
WBC # BLD AUTO: 7.1 K/UL (ref 4.1–11.1)

## 2018-02-09 PROCEDURE — 74011250637 HC RX REV CODE- 250/637: Performed by: FAMILY MEDICINE

## 2018-02-09 PROCEDURE — 74011250636 HC RX REV CODE- 250/636: Performed by: NURSE PRACTITIONER

## 2018-02-09 PROCEDURE — 85730 THROMBOPLASTIN TIME PARTIAL: CPT | Performed by: NURSE PRACTITIONER

## 2018-02-09 PROCEDURE — 65660000000 HC RM CCU STEPDOWN

## 2018-02-09 PROCEDURE — 85025 COMPLETE CBC W/AUTO DIFF WBC: CPT | Performed by: FAMILY MEDICINE

## 2018-02-09 PROCEDURE — 74011250636 HC RX REV CODE- 250/636: Performed by: INTERNAL MEDICINE

## 2018-02-09 PROCEDURE — 94640 AIRWAY INHALATION TREATMENT: CPT

## 2018-02-09 PROCEDURE — 36415 COLL VENOUS BLD VENIPUNCTURE: CPT | Performed by: FAMILY MEDICINE

## 2018-02-09 PROCEDURE — 83735 ASSAY OF MAGNESIUM: CPT | Performed by: FAMILY MEDICINE

## 2018-02-09 PROCEDURE — 76705 ECHO EXAM OF ABDOMEN: CPT

## 2018-02-09 PROCEDURE — 93970 EXTREMITY STUDY: CPT

## 2018-02-09 PROCEDURE — 74011000250 HC RX REV CODE- 250: Performed by: NURSE PRACTITIONER

## 2018-02-09 PROCEDURE — 85730 THROMBOPLASTIN TIME PARTIAL: CPT | Performed by: INTERNAL MEDICINE

## 2018-02-09 PROCEDURE — 74011000250 HC RX REV CODE- 250: Performed by: FAMILY MEDICINE

## 2018-02-09 PROCEDURE — 74011250636 HC RX REV CODE- 250/636: Performed by: SPECIALIST

## 2018-02-09 PROCEDURE — A9540 TC99M MAA: HCPCS

## 2018-02-09 PROCEDURE — 80048 BASIC METABOLIC PNL TOTAL CA: CPT | Performed by: FAMILY MEDICINE

## 2018-02-09 PROCEDURE — 74011250637 HC RX REV CODE- 250/637: Performed by: NURSE PRACTITIONER

## 2018-02-09 RX ORDER — HEPARIN SODIUM 5000 [USP'U]/ML
30 INJECTION, SOLUTION INTRAVENOUS; SUBCUTANEOUS ONCE
Status: COMPLETED | OUTPATIENT
Start: 2018-02-09 | End: 2018-02-09

## 2018-02-09 RX ORDER — SODIUM CHLORIDE 9 MG/ML
250 INJECTION, SOLUTION INTRAVENOUS ONCE
Status: COMPLETED | OUTPATIENT
Start: 2018-02-09 | End: 2018-02-09

## 2018-02-09 RX ORDER — HEPARIN SODIUM 10000 [USP'U]/100ML
12-25 INJECTION, SOLUTION INTRAVENOUS
Status: DISCONTINUED | OUTPATIENT
Start: 2018-02-09 | End: 2018-02-13

## 2018-02-09 RX ORDER — LOSARTAN POTASSIUM 25 MG/1
12.5 TABLET ORAL DAILY
Status: DISCONTINUED | OUTPATIENT
Start: 2018-02-09 | End: 2018-02-13 | Stop reason: HOSPADM

## 2018-02-09 RX ORDER — METOPROLOL SUCCINATE 25 MG/1
25 TABLET, EXTENDED RELEASE ORAL DAILY
Status: DISCONTINUED | OUTPATIENT
Start: 2018-02-09 | End: 2018-02-10

## 2018-02-09 RX ADMIN — FINASTERIDE 5 MG: 5 TABLET, FILM COATED ORAL at 08:51

## 2018-02-09 RX ADMIN — BUMETANIDE 2 MG: 0.25 INJECTION INTRAMUSCULAR; INTRAVENOUS at 08:52

## 2018-02-09 RX ADMIN — IPRATROPIUM BROMIDE AND ALBUTEROL SULFATE 3 ML: .5; 3 SOLUTION RESPIRATORY (INHALATION) at 08:24

## 2018-02-09 RX ADMIN — METOPROLOL SUCCINATE 25 MG: 25 TABLET, EXTENDED RELEASE ORAL at 11:20

## 2018-02-09 RX ADMIN — IPRATROPIUM BROMIDE AND ALBUTEROL SULFATE 3 ML: .5; 3 SOLUTION RESPIRATORY (INHALATION) at 19:49

## 2018-02-09 RX ADMIN — ATORVASTATIN CALCIUM 40 MG: 40 TABLET, FILM COATED ORAL at 08:51

## 2018-02-09 RX ADMIN — Medication 10 ML: at 06:42

## 2018-02-09 RX ADMIN — HEPARIN SODIUM AND DEXTROSE 14 UNITS/KG/HR: 10000; 5 INJECTION INTRAVENOUS at 19:35

## 2018-02-09 RX ADMIN — IPRATROPIUM BROMIDE AND ALBUTEROL SULFATE 3 ML: .5; 3 SOLUTION RESPIRATORY (INHALATION) at 04:24

## 2018-02-09 RX ADMIN — HEPARIN SODIUM AND DEXTROSE 12 UNITS/KG/HR: 10000; 5 INJECTION INTRAVENOUS at 11:44

## 2018-02-09 RX ADMIN — TAMSULOSIN HYDROCHLORIDE 0.4 MG: 0.4 CAPSULE ORAL at 08:51

## 2018-02-09 RX ADMIN — ASPIRIN 81 MG 81 MG: 81 TABLET ORAL at 08:51

## 2018-02-09 RX ADMIN — IPRATROPIUM BROMIDE AND ALBUTEROL SULFATE 3 ML: .5; 3 SOLUTION RESPIRATORY (INHALATION) at 01:11

## 2018-02-09 RX ADMIN — SODIUM CHLORIDE 250 ML: 900 INJECTION, SOLUTION INTRAVENOUS at 21:34

## 2018-02-09 RX ADMIN — IPRATROPIUM BROMIDE AND ALBUTEROL SULFATE 3 ML: .5; 3 SOLUTION RESPIRATORY (INHALATION) at 12:42

## 2018-02-09 RX ADMIN — Medication 10 ML: at 15:30

## 2018-02-09 RX ADMIN — IPRATROPIUM BROMIDE AND ALBUTEROL SULFATE 3 ML: .5; 3 SOLUTION RESPIRATORY (INHALATION) at 15:11

## 2018-02-09 RX ADMIN — HEPARIN SODIUM 2000 UNITS: 5000 INJECTION, SOLUTION INTRAVENOUS; SUBCUTANEOUS at 19:38

## 2018-02-09 RX ADMIN — Medication 10 ML: at 21:34

## 2018-02-09 RX ADMIN — LOSARTAN POTASSIUM 12.5 MG: 25 TABLET ORAL at 12:00

## 2018-02-09 NOTE — PROGRESS NOTES
1930: Bedside and Verbal shift change report given to Isela Bhatti RN (oncoming nurse) by Gunnar Lord RN (offgoing nurse). Report included the following information SBAR, Kardex, ED Summary, Intake/Output, MAR, Recent Results and Med Rec Status. Patient cardiac rhythm Afib  2300: RN spoke with Dr. Sanju conde regarding patient BP of 88/52 and Troponin level of 0.32. MD order with read back to decrease Cardizem drip from 10 mg/hr to 5 mg/hr. MD order to titrate medication back to 10 mg/hr if patient HR >120 as long as SBP is >90. MD aware of Troponin level. Will continue to monitor. 7714: Patient had a 7 beat run of V-Tach. Patient asymptomatic.   0730: Bedside and Verbal shift change report given to Faiza Knight RN (oncoming nurse) by Isela Bhatti RN (offgoing nurse). Report included the following information SBAR, Kardex, ED Summary, Intake/Output, MAR, Recent Results, Med Rec Status and Cardiac Rhythm Afib.

## 2018-02-09 NOTE — NURSE NAVIGATOR
Chart reviewed by Heart Failure Nurse Navigator. Heart Failure database completed. EF: [facility] echo pending (per note EF 25% this week at [facility]). ACEi/ARB: lisinopril 10mg PTA - changed to losartan 50 mg inpt. BB: toprol xl 25 daily PTA - changed to coreg 6.25 mg twice daily. CRT:      NYHA Functional Class documentation requested via Provider Message on [employer]. Heart Failure Teach Back in Patient Education. Heart Failure Avoiding Triggers on Discharge Instructions.       Cardiologist:  Dr. Ruth Gomez ([facility])

## 2018-02-09 NOTE — PROGRESS NOTES
Hospitalist Progress Note  Kenny Cole MD  Answering service: [phone] OR 36 from in house phone  Cell: [phone]      Date of Service:  2018  NAME:  Danelle Cole  :  1931  MRN:  [id number]      Admission Summary:   The patient is an 80-year-old male with past medical history of CHF, hypertension, DVT, AAA, atherosclerosis, right inguinal hernia, cholelithiasis, BPH, who presents to the hospital complaining of SOB    Interval history / Subjective:     F/u SOB  Feels better now but is quite drowsy     Assessment & Plan:     Acute on chronic systolic CHF, NYHA class IV on admission  -Echo results awaited  -On IV diuresis, continue  -Strict I/Os, continue clinton  -Daily weight  -Appreciate Cardiology input  -On ASA/Lipitor/Losartan/Metoprolol XL    A fib with RVR  -Still on cardizem drip  -On IV heparin, family still refusing oral anticoagulation  -ASA started     Chest pain with a history of DVT  -V/Q scan low probability for PE  -D dimer 7.8  -Troponin elevated  -Cardiology on board    CKD stage III  -stable  -monitor    History of AAA  -5.2 cm in diameter per US  -Outpatient follow up    Hypokalemia  -replace as needed    Transaminitis  -US abd  A mildly echogenic and heterogeneous liver can be seen with steatosis or  other nonspecific parenchymal liver disease. There is no focal liver mass.  Gallstones without biliary duct dilatation  -On Statin, will continue for now and repeat CMP tomorrow  -Monitor    History of CVA  -on ASA/Statin    BPH  -on Flomax/Proscar    Cardiac diet    Code status: FULL CODE  DVT prophylaxis: Heparin  PTA: home with wife (usually goes to [facility] but was on diversion)    Plan: Continue IV diuresis, follow cardiology    Care Plan discussed with: Patient/Family  Disposition: TBD     Hospital Problems  Date Reviewed: 2018          Codes Class Noted POA    * (Principal)SOB (shortness of breath) ICD-10-CM: R06.02  ICD-9-CM: 786.05  2/8/2018 Unknown                Review of Systems:   A comprehensive review of systems was negative except for that written in the HPI. Vital Signs:    Last 24hrs VS reviewed since prior progress note. Most recent are:  Visit Vitals    /61 (BP 1 Location: Left arm, BP Patient Position: At rest)    Pulse 94    Temp 98.5 °F (36.9 °C)    Resp 20    Ht 5' 9\" (1.753 m)    Wt 66.5 kg (146 lb 9.7 oz)    SpO2 94%    BMI 21.65 kg/m2         Intake/Output Summary (Last 24 hours) at 02/09/18 1608  Last data filed at 02/09/18 1500   Gross per 24 hour   Intake              960 ml   Output             2100 ml   Net            -1140 ml        Physical Examination:             Constitutional:  No acute distress, cooperative, pleasant    ENT:  Oral mucous moist, oropharynx benign. Neck supple,    Resp:  CTA bilaterally. No wheezing/rhonchi/rales. No accessory muscle use   CV:  Regular rhythm, normal rate, no murmurs, gallops, rubs    GI:  Soft, non distended, non tender. normoactive bowel sounds, no hepatosplenomegaly     Musculoskeletal:  No edema, warm, 2+ pulses throughout    Neurologic:  Moves all extremities.   AAOx3, CN II-XII reviewed     Skin:  Good turgor, no rashes or ulcers       Data Review:    Review and/or order of clinical lab test      Labs:     Recent Labs      02/09/18   1129  02/09/18   0146   WBC  6.3  7.1   HGB  10.6*  11.0*   HCT  33.1*  33.7*   PLT  178  138*     Recent Labs      02/09/18   0146  02/08/18   1805  02/08/18   1256   NA  145   --   146*   K  4.0   --   3.2*   CL  106   --   106   CO2  30   --   29   BUN  33*   --   28*   CREA  1.60*   --   1.56*   GLU  107*   --   112*   CA  7.9*   --   8.0*   MG  2.0  2.0  1.7     Recent Labs      02/08/18   1256   SGOT  105*   ALT  110*   AP  121*   TBILI  1.5*   TP  7.8   ALB  3.4*   GLOB  4.4*     Recent Labs      02/09/18   1301   APTT  34.8*      No results for input(s): FE, TIBC, PSAT, FERR in the last 72 hours. No results found for: FOL, RBCF   No results for input(s): PH, PCO2, PO2 in the last 72 hours.   Recent Labs      02/08/18 2014 02/08/18   1621  02/08/18   1256   TROIQ  0.32*  0.11*  0.10*     Lab Results   Component Value Date/Time    Cholesterol, total 116 02/08/2018 06:05 PM    HDL Cholesterol 35 02/08/2018 06:05 PM    LDL, calculated 63.6 02/08/2018 06:05 PM    Triglyceride 87 02/08/2018 06:05 PM    CHOL/HDL Ratio 3.3 02/08/2018 06:05 PM     No results found for: GLUCPOC  No results found for: COLOR, APPRN, SPGRU, REFSG, SERGIO, PROTU, GLUCU, KETU, BILU, UROU, MARCELA, LEUKU, GLUKE, EPSU, BACTU, WBCU, RBCU, CASTS, UCRY      Medications Reviewed:     Current Facility-Administered Medications   Medication Dose Route Frequency    losartan (COZAAR) tablet 12.5 mg  12.5 mg Oral DAILY    metoprolol succinate (TOPROL-XL) XL tablet 25 mg  25 mg Oral DAILY    heparin 25,000 units in D5W 250 ml infusion  12-25 Units/kg/hr IntraVENous TITRATE    bumetanide (BUMEX) injection 2 mg  2 mg IntraVENous Q12H    sodium chloride (NS) flush 5-10 mL  5-10 mL IntraVENous Q8H    sodium chloride (NS) flush 5-10 mL  5-10 mL IntraVENous PRN    acetaminophen (TYLENOL) tablet 650 mg  650 mg Oral Q4H PRN    aspirin chewable tablet 81 mg  81 mg Oral DAILY    atorvastatin (LIPITOR) tablet 40 mg  40 mg Oral DAILY    finasteride (PROSCAR) tablet 5 mg  5 mg Oral DAILY    tamsulosin (FLOMAX) capsule 0.4 mg  0.4 mg Oral DAILY    nitroglycerin (NITROSTAT) tablet 0.4 mg  0.4 mg SubLINGual Q5MIN PRN    albuterol-ipratropium (DUO-NEB) 2.5 MG-0.5 MG/3 ML  3 mL Nebulization Q4H RT    influenza vaccine 2017-18 (3 yrs+)(PF) (FLUZONE QUAD/FLUARIX QUAD) injection 0.5 mL  0.5 mL IntraMUSCular PRIOR TO DISCHARGE    dilTIAZem (CARDIZEM) 125 mg in dextrose 5% 125 mL infusion  2.5 mg/hr IntraVENous CONTINUOUS     ______________________________________________________________________  EXPECTED LENGTH OF STAY: 2d 12h  ACTUAL LENGTH OF STAY:          1                 Kenyatta Hurley MD

## 2018-02-09 NOTE — PROGRESS NOTES
[facility] Progress Note    2/9/2018 9:17 AM  Admit Date: 2/8/2018  Admit Diagnosis: SOB (shortness of breath)    Assessment/Plan     1. Acute on chronic systolic heart failure - NYHA class IV on admission and class III today, ProBNP 17,119 on admission with pulmonary edema on CXR  -continue diuresis with bumex 2mg IV BID  -will reduce losartan to 12.5mg daily and change coreg to Toprol XL 25mg daily for hypotension   -TTE results pending  -continue daily I/Os, weight  2. Afib with RVR - currently rate controlled on coreg and IV Diltiazem at 5mg/hr  -will transition coreg to Toprol XL as above  -will reduce IV Diltiazem to 2.5mg/hr with the plan to wean off IV Diltiazem today if possible  -[id number]= 6 (age, CHF, HTN, CVA) so will begin IV heparin for anticoagulation  -wife/pt refuse [facility] so will continue ASA 81 mg daily    3. Chest pain with elevated troponin - chest pain resolved, troponin elevation non-specific in the setting of CKD and Atrial Fib with RVR, 12 lead EKG with no ischemic changes noted  -D-dimer 7.8 and VQ scan pending  -continue ASA, statin and Toprol XL   4. Hx of HTN - currently with hypotension, will reduce losartan to 12.5mg daily and change coreg to Toprol XL 25mg daily, continue to monitor  5. CKD stage 3 - creatinine stable at 1.6, it was 1.6 at [address] [place] [address] on 2/4/18, monitor with diuresis    6. Hx of DVT - uncertain date, not on [facility], will begin IV heparin as above, will order venous duplex to assess for DVT today   7. ? Hx of AAA per [facility] records  8. Elevated liver enzymes - on statin, workup per primary team  9. Hx of TBI - no detail known  10. Hx of CVA - noted on head CT, on ASA and statin, starting IV heparin as above  11. Advanced directives - currently full code, appreciated palliative care consultation, patient and wife seem to have little insight into pt's chronic health diseases, concern for end stage CHF with limited management options.      Subjective: Pete Warren denies chest pain or dyspnea. Not very conversant. No family at bedside. Objective:      Physical Exam:  Visit Vitals    /70 (BP 1 Location: Right arm, BP Patient Position: At rest)    Pulse 99    Temp 98.3 °F (36.8 °C)    Resp 18    Ht 5' 9\" (1.753 m)    Wt 146 lb 9.7 oz (66.5 kg)    SpO2 97%    BMI 21.65 kg/m2     General Appearance:  Well developed, well nourished, alert and oriented x 3, in no acute distress. Ears/Nose/Mouth/Throat:   Hearing grossly normal.         Neck: Supple. Chest:   Scattered crackles bilaterally. Cardiovascular:  Irregular rate and rhythm, S1, S2 normal, 2/6 BERNADETTE    Abdomen:   Soft, non-tender, bowel sounds are active. Extremities: 1+ LE edema in right leg, trace LE edema in left leg     Skin: Warm and dry.            Telemetry: normal sinus rhythm with PVCs    Data Review:   Labs:    Recent Results (from the past 24 hour(s))   TROPONIN I    Collection Time: 02/08/18  4:21 PM   Result Value Ref Range    Troponin-I, Qt. 0.11 (H) <0.05 ng/mL   INFLUENZA A & B AG (RAPID TEST)    Collection Time: 02/08/18  5:22 PM   Result Value Ref Range    Influenza A Antigen NEGATIVE  NEG      Influenza B Antigen NEGATIVE  NEG     LIPID PANEL    Collection Time: 02/08/18  6:05 PM   Result Value Ref Range    LIPID PROFILE          Cholesterol, total 116 <200 MG/DL    Triglyceride 87 <150 MG/DL    HDL Cholesterol 35 MG/DL    LDL, calculated 63.6 0 - 100 MG/DL    VLDL, calculated 17.4 MG/DL    CHOL/HDL Ratio 3.3 0 - 5.0     TSH 3RD GENERATION    Collection Time: 02/08/18  6:05 PM   Result Value Ref Range    TSH 0.81 0.36 - 3.74 uIU/mL   MAGNESIUM    Collection Time: 02/08/18  6:05 PM   Result Value Ref Range    Magnesium 2.0 1.6 - 2.4 mg/dL   TROPONIN I    Collection Time: 02/08/18  8:14 PM   Result Value Ref Range    Troponin-I, Qt. 0.32 (H) <0.05 ng/mL   CBC WITH AUTOMATED DIFF    Collection Time: 02/09/18  1:46 AM   Result Value Ref Range    WBC 7.1 4.1 - 11.1 K/uL    RBC 3.39 (L) 4.10 - 5.70 M/uL    HGB 11.0 (L) 12.1 - 17.0 g/dL    HCT 33.7 (L) 36.6 - 50.3 %    MCV 99.4 (H) 80.0 - 99.0 FL    MCH 32.4 26.0 - 34.0 PG    MCHC 32.6 30.0 - 36.5 g/dL    RDW 16.8 (H) 11.5 - 14.5 %    PLATELET 009 (L) 495 - 400 K/uL    MPV 12.7 8.9 - 12.9 FL    NRBC 0.8 (H) 0  WBC    ABSOLUTE NRBC 0.06 (H) 0.00 - 0.01 K/uL    NEUTROPHILS 81 (H) 32 - 75 %    BAND NEUTROPHILS 1 0 - 6 %    LYMPHOCYTES 8 (L) 12 - 49 %    MONOCYTES 9 5 - 13 %    EOSINOPHILS 0 0 - 7 %    BASOPHILS 1 0 - 1 %    IMMATURE GRANULOCYTES 0 %    ABS. NEUTROPHILS 5.8 1.8 - 8.0 K/UL    ABS. LYMPHOCYTES 0.6 (L) 0.8 - 3.5 K/UL    ABS. MONOCYTES 0.6 0.0 - 1.0 K/UL    ABS. EOSINOPHILS 0.0 0.0 - 0.4 K/UL    ABS. BASOPHILS 0.1 0.0 - 0.1 K/UL    ABS. IMM.  GRANS. 0.0 K/UL    DF MANUAL      RBC COMMENTS ANISOCYTOSIS  1+        RBC COMMENTS OVALOCYTES  PRESENT        RBC COMMENTS MACROCYTOSIS  1+        RBC COMMENTS HYPOCHROMIA  1+        RBC COMMENTS POLYCHROMASIA  PRESENT       MAGNESIUM    Collection Time: 02/09/18  1:46 AM   Result Value Ref Range    Magnesium 2.0 1.6 - 2.4 mg/dL   METABOLIC PANEL, BASIC    Collection Time: 02/09/18  1:46 AM   Result Value Ref Range    Sodium 145 136 - 145 mmol/L    Potassium 4.0 3.5 - 5.1 mmol/L    Chloride 106 97 - 108 mmol/L    CO2 30 21 - 32 mmol/L    Anion gap 9 5 - 15 mmol/L    Glucose 107 (H) 65 - 100 mg/dL    BUN 33 (H) 6 - 20 MG/DL    Creatinine 1.60 (H) 0.70 - 1.30 MG/DL    BUN/Creatinine ratio 21 (H) 12 - 20      GFR est AA 50 (L) >60 ml/min/1.73m2    GFR est non-AA 41 (L) >60 ml/min/1.73m2    Calcium 7.9 (L) 8.5 - 10.1 MG/DL   CBC WITH AUTOMATED DIFF    Collection Time: 02/09/18 11:29 AM   Result Value Ref Range    WBC 6.3 4.1 - 11.1 K/uL    RBC 3.35 (L) 4.10 - 5.70 M/uL    HGB 10.6 (L) 12.1 - 17.0 g/dL    HCT 33.1 (L) 36.6 - 50.3 %    MCV 98.8 80.0 - 99.0 FL    MCH 31.6 26.0 - 34.0 PG    MCHC 32.0 30.0 - 36.5 g/dL    RDW 16.8 (H) 11.5 - 14.5 %    PLATELET 619 582 - 503 K/uL MPV 12.5 8.9 - 12.9 FL    NRBC 0.3 (H) 0  WBC    ABSOLUTE NRBC 0.02 (H) 0.00 - 0.01 K/uL    NEUTROPHILS 81 (H) 32 - 75 %    LYMPHOCYTES 8 (L) 12 - 49 %    MONOCYTES 10 5 - 13 %    EOSINOPHILS 0 0 - 7 %    BASOPHILS 1 0 - 1 %    IMMATURE GRANULOCYTES 0 0.0 - 0.5 %    ABS. NEUTROPHILS 5.1 1.8 - 8.0 K/UL    ABS. LYMPHOCYTES 0.5 (L) 0.8 - 3.5 K/UL    ABS. MONOCYTES 0.6 0.0 - 1.0 K/UL    ABS. EOSINOPHILS 0.0 0.0 - 0.4 K/UL    ABS. BASOPHILS 0.1 0.0 - 0.1 K/UL    ABS. IMM.  GRANS. 0.0 0.00 - 0.04 K/UL    DF SMEAR SCANNED      PLATELET COMMENTS Large Platelets      RBC COMMENTS ANISOCYTOSIS  1+        RBC COMMENTS MACROCYTOSIS  1+        RBC COMMENTS KINSEY CELLS  PRESENT               Current Facility-Administered Medications   Medication Dose Route Frequency    losartan (COZAAR) tablet 12.5 mg  12.5 mg Oral DAILY    metoprolol succinate (TOPROL-XL) XL tablet 25 mg  25 mg Oral DAILY    heparin 25,000 units in D5W 250 ml infusion  12-25 Units/kg/hr IntraVENous TITRATE    bumetanide (BUMEX) injection 2 mg  2 mg IntraVENous Q12H    sodium chloride (NS) flush 5-10 mL  5-10 mL IntraVENous Q8H    sodium chloride (NS) flush 5-10 mL  5-10 mL IntraVENous PRN    acetaminophen (TYLENOL) tablet 650 mg  650 mg Oral Q4H PRN    aspirin chewable tablet 81 mg  81 mg Oral DAILY    atorvastatin (LIPITOR) tablet 40 mg  40 mg Oral DAILY    finasteride (PROSCAR) tablet 5 mg  5 mg Oral DAILY    tamsulosin (FLOMAX) capsule 0.4 mg  0.4 mg Oral DAILY    nitroglycerin (NITROSTAT) tablet 0.4 mg  0.4 mg SubLINGual Q5MIN PRN    albuterol-ipratropium (DUO-NEB) 2.5 MG-0.5 MG/3 ML  3 mL Nebulization Q4H RT    influenza vaccine 2017-18 (3 yrs+)(PF) (FLUZONE QUAD/FLUARIX QUAD) injection 0.5 mL  0.5 mL IntraMUSCular PRIOR TO DISCHARGE    dilTIAZem (CARDIZEM) 125 mg in dextrose 5% 125 mL infusion  2.5 mg/hr IntraVENous Junior Ty MD  [facility] [address], [address] [facility], [address] [facility]  [phone]

## 2018-02-09 NOTE — CONSULTS
PALLIATIVE MEDICINE            Consult noted and appreciated. We will follow up with the patient on Monday 2/12/18. Thank you. Erum Speaker.  [address] MSN, FNP-BC, [facility]

## 2018-02-09 NOTE — CDMP QUERY
Please clarify if this patient is (was) being treated/managed for:     => Hypokalemia (POA) in the setting of K level 3.2 on arrival requiring KCL replacement and lab monitoring  => Other explanation of clinical findings  => Unable to determine (no explanation for clinical findings)    The medical record reflects the following:     Clinical Indicators/Risk Factors: K level 3.2 on arrival to ED    Treatment: KCL po supplementation and lab monitoring    Please clarify and document your clinical opinion in the progress notes and discharge summary including the definitive and/or presumptive diagnosis, (suspected or probable), related to the above clinical findings. Please include clinical findings supporting your diagnosis.     Thank you,    Tomi Ceballos, RN, BSN, CCRN,CDMP  [phone]

## 2018-02-09 NOTE — PROGRESS NOTES
CM attempted to meet with patient to complete initial assessment; patient asleep. Weekend CM staff can be reached at extension [phone] if patient is discharged over the weekend once orders are written for h2h vs home health.      Johanna Pedersen MS

## 2018-02-09 NOTE — PROGRESS NOTES
0730: Bedside and Verbal shift change report given to Lauren Shrestha (oncoming nurse) by Raphael Russ (offgoing nurse). Report included the following information SBAR, Kardex, Recent Results and Med Rec Status. 1600: Monitor tech reported that pt had a 13 beat run of VT. Notified MD Yohan.  1930: Pt BP 82/58 retaken and continued to be 85/46. Pt is asymptomatic and HR is 80-90's AFib w/ frequent PVC's. Paged Cardiology. 2000:Spoke with Dr. Rios Chaudhry. Dr. Rios Chaudhry gave TORB to continue cardizem drip at 2.5mg/hr and to monitor BP for one hour. If BP systolic continues in the 80's Dr. Rios Chaudhry gave TORB to give a 250cc IV bolus of 0.9NS. JENNIFER Tanner aware and given orders. Will continue to monitor. Alejandro Vergara also gave TORB to hold 2100 scheduled Bumex and to relay message to morning staff to hold BP meds if pressure continues to be below 787 systolic. JENNIFER Tanner aware and given orders. 2000: Bedside and Verbal shift change report given to JENNIFER Tanner (oncoming nurse) by [facility] E Sybil Shrestha (offgoing nurse). Report included the following information SBAR, Recent Results and Med Rec Status.

## 2018-02-09 NOTE — CDMP QUERY
There is documentation of CHF exacerbation in the [facility]   Based on the need for increased specificity in documentation please include the following components in your documentation regarding:  CHF     Documentation for heart failure must:      Specify Acuity :   Acute, Chronic, acute on chronic     Identify Type:    Systolic, Diastolic, Combined systolic and diastolic failure      List the relationship of HTN to Heart Failure   Identify the underlying cause     Thank you,    Kimberly Aldrich RN, BSN, [facility], [facility]  [phone]

## 2018-02-09 NOTE — PROGRESS NOTES
Problem: Falls - Risk of  Goal: *Absence of Falls  Document [place] Fall Risk and appropriate interventions in the flowsheet. Outcome: Progressing Towards Goal  Fall Risk Interventions:  Mobility Interventions: Communicate number of staff needed for ambulation/transfer, OT consult for ADLs, Patient to call before getting OOB, PT Consult for mobility concerns, PT Consult for assist device competence, Strengthening exercises (ROM-active/passive)    Mentation Interventions: Adequate sleep, hydration, pain control, Evaluate medications/consider consulting pharmacy, More frequent rounding, Room close to nurse's station, Reorient patient, Toileting rounds, Update white board    Medication Interventions: Evaluate medications/consider consulting pharmacy, Patient to call before getting OOB, Teach patient to arise slowly    Elimination Interventions: Call light in reach, Patient to call for help with toileting needs, Toilet paper/wipes in reach, Toileting schedule/hourly rounds             Problem: Pressure Injury - Risk of  Goal: *Prevention of pressure ulcer  Outcome: Progressing Towards Goal  Pt remains free of pressure injury.

## 2018-02-10 LAB
ALBUMIN SERPL-MCNC: 2.3 G/DL (ref 3.5–5)
ALBUMIN/GLOB SERPL: 0.6 {RATIO} (ref 1.1–2.2)
ALP SERPL-CCNC: 91 U/L (ref 45–117)
ALT SERPL-CCNC: 72 U/L (ref 12–78)
ANION GAP SERPL CALC-SCNC: 7 MMOL/L (ref 5–15)
APTT PPP: 56.3 SEC (ref 22.1–32.5)
APTT PPP: 61.2 SEC (ref 22.1–32.5)
APTT PPP: 83.5 SEC (ref 22.1–32.5)
AST SERPL-CCNC: 59 U/L (ref 15–37)
BASOPHILS # BLD: 0.1 K/UL (ref 0–0.1)
BASOPHILS NFR BLD: 2 % (ref 0–1)
BILIRUB SERPL-MCNC: 1.1 MG/DL (ref 0.2–1)
BUN SERPL-MCNC: 36 MG/DL (ref 6–20)
BUN/CREAT SERPL: 23 (ref 12–20)
CALCIUM SERPL-MCNC: 7.1 MG/DL (ref 8.5–10.1)
CHLORIDE SERPL-SCNC: 102 MMOL/L (ref 97–108)
CO2 SERPL-SCNC: 33 MMOL/L (ref 21–32)
CREAT SERPL-MCNC: 1.57 MG/DL (ref 0.7–1.3)
DIFFERENTIAL METHOD BLD: ABNORMAL
EOSINOPHIL # BLD: 0 K/UL (ref 0–0.4)
EOSINOPHIL NFR BLD: 0 % (ref 0–7)
ERYTHROCYTE [DISTWIDTH] IN BLOOD BY AUTOMATED COUNT: 16.3 % (ref 11.5–14.5)
GLOBULIN SER CALC-MCNC: 3.8 G/DL (ref 2–4)
GLUCOSE SERPL-MCNC: 102 MG/DL (ref 65–100)
HCT VFR BLD AUTO: 30.6 % (ref 36.6–50.3)
HGB BLD-MCNC: 10.1 G/DL (ref 12.1–17)
IMM GRANULOCYTES # BLD: 0 K/UL
IMM GRANULOCYTES NFR BLD AUTO: 0 %
LYMPHOCYTES # BLD: 0.7 K/UL (ref 0.8–3.5)
LYMPHOCYTES NFR BLD: 12 % (ref 12–49)
MAGNESIUM SERPL-MCNC: 1.9 MG/DL (ref 1.6–2.4)
MCH RBC QN AUTO: 32.9 PG (ref 26–34)
MCHC RBC AUTO-ENTMCNC: 33 G/DL (ref 30–36.5)
MCV RBC AUTO: 99.7 FL (ref 80–99)
MONOCYTES # BLD: 0.5 K/UL (ref 0–1)
MONOCYTES NFR BLD: 8 % (ref 5–13)
NEUTS SEG # BLD: 4.5 K/UL (ref 1.8–8)
NEUTS SEG NFR BLD: 78 % (ref 32–75)
NRBC # BLD: 0.02 K/UL (ref 0–0.01)
NRBC BLD-RTO: 0.3 PER 100 WBC
PLATELET # BLD AUTO: 121 K/UL (ref 150–400)
PMV BLD AUTO: 12.7 FL (ref 8.9–12.9)
POTASSIUM SERPL-SCNC: 3 MMOL/L (ref 3.5–5.1)
PROT SERPL-MCNC: 6.1 G/DL (ref 6.4–8.2)
RBC # BLD AUTO: 3.07 M/UL (ref 4.1–5.7)
RBC MORPH BLD: ABNORMAL
SODIUM SERPL-SCNC: 142 MMOL/L (ref 136–145)
THERAPEUTIC RANGE,PTTT: ABNORMAL SECS (ref 58–77)
WBC # BLD AUTO: 5.8 K/UL (ref 4.1–11.1)

## 2018-02-10 PROCEDURE — 74011250637 HC RX REV CODE- 250/637: Performed by: HOSPITALIST

## 2018-02-10 PROCEDURE — 97530 THERAPEUTIC ACTIVITIES: CPT

## 2018-02-10 PROCEDURE — 77010033678 HC OXYGEN DAILY

## 2018-02-10 PROCEDURE — 85730 THROMBOPLASTIN TIME PARTIAL: CPT | Performed by: NURSE PRACTITIONER

## 2018-02-10 PROCEDURE — 74011250637 HC RX REV CODE- 250/637: Performed by: FAMILY MEDICINE

## 2018-02-10 PROCEDURE — 74011250637 HC RX REV CODE- 250/637: Performed by: INTERNAL MEDICINE

## 2018-02-10 PROCEDURE — 94640 AIRWAY INHALATION TREATMENT: CPT

## 2018-02-10 PROCEDURE — 85025 COMPLETE CBC W/AUTO DIFF WBC: CPT | Performed by: HOSPITALIST

## 2018-02-10 PROCEDURE — 97161 PT EVAL LOW COMPLEX 20 MIN: CPT

## 2018-02-10 PROCEDURE — 97116 GAIT TRAINING THERAPY: CPT

## 2018-02-10 PROCEDURE — 83735 ASSAY OF MAGNESIUM: CPT | Performed by: NURSE PRACTITIONER

## 2018-02-10 PROCEDURE — 80053 COMPREHEN METABOLIC PANEL: CPT | Performed by: HOSPITALIST

## 2018-02-10 PROCEDURE — 36415 COLL VENOUS BLD VENIPUNCTURE: CPT | Performed by: NURSE PRACTITIONER

## 2018-02-10 PROCEDURE — 65660000000 HC RM CCU STEPDOWN

## 2018-02-10 PROCEDURE — 74011000250 HC RX REV CODE- 250: Performed by: FAMILY MEDICINE

## 2018-02-10 PROCEDURE — 74011250636 HC RX REV CODE- 250/636: Performed by: NURSE PRACTITIONER

## 2018-02-10 RX ORDER — BUMETANIDE 1 MG/1
1 TABLET ORAL 2 TIMES DAILY
Status: DISCONTINUED | OUTPATIENT
Start: 2018-02-10 | End: 2018-02-13 | Stop reason: HOSPADM

## 2018-02-10 RX ORDER — METOPROLOL SUCCINATE 50 MG/1
50 TABLET, EXTENDED RELEASE ORAL DAILY
Status: DISCONTINUED | OUTPATIENT
Start: 2018-02-11 | End: 2018-02-13 | Stop reason: HOSPADM

## 2018-02-10 RX ORDER — HEPARIN SODIUM 5000 [USP'U]/ML
4000 INJECTION, SOLUTION INTRAVENOUS; SUBCUTANEOUS ONCE
Status: DISCONTINUED | OUTPATIENT
Start: 2018-02-10 | End: 2018-02-10 | Stop reason: CLARIF

## 2018-02-10 RX ORDER — POTASSIUM CHLORIDE 750 MG/1
40 TABLET, FILM COATED, EXTENDED RELEASE ORAL
Status: COMPLETED | OUTPATIENT
Start: 2018-02-10 | End: 2018-02-10

## 2018-02-10 RX ADMIN — ASPIRIN 81 MG 81 MG: 81 TABLET ORAL at 09:18

## 2018-02-10 RX ADMIN — HEPARIN SODIUM AND DEXTROSE 13 UNITS/KG/HR: 10000; 5 INJECTION INTRAVENOUS at 13:40

## 2018-02-10 RX ADMIN — IPRATROPIUM BROMIDE AND ALBUTEROL SULFATE 3 ML: .5; 3 SOLUTION RESPIRATORY (INHALATION) at 03:14

## 2018-02-10 RX ADMIN — POTASSIUM CHLORIDE 40 MEQ: 750 TABLET, EXTENDED RELEASE ORAL at 14:42

## 2018-02-10 RX ADMIN — HEPARIN SODIUM AND DEXTROSE 13 UNITS/KG/HR: 10000; 5 INJECTION INTRAVENOUS at 20:14

## 2018-02-10 RX ADMIN — Medication 10 ML: at 05:28

## 2018-02-10 RX ADMIN — FINASTERIDE 5 MG: 5 TABLET, FILM COATED ORAL at 09:00

## 2018-02-10 RX ADMIN — TAMSULOSIN HYDROCHLORIDE 0.4 MG: 0.4 CAPSULE ORAL at 09:18

## 2018-02-10 RX ADMIN — IPRATROPIUM BROMIDE AND ALBUTEROL SULFATE 3 ML: .5; 3 SOLUTION RESPIRATORY (INHALATION) at 19:33

## 2018-02-10 RX ADMIN — IPRATROPIUM BROMIDE AND ALBUTEROL SULFATE 3 ML: .5; 3 SOLUTION RESPIRATORY (INHALATION) at 09:34

## 2018-02-10 RX ADMIN — BUMETANIDE 1 MG: 1 TABLET ORAL at 17:38

## 2018-02-10 RX ADMIN — IPRATROPIUM BROMIDE AND ALBUTEROL SULFATE 3 ML: .5; 3 SOLUTION RESPIRATORY (INHALATION) at 00:28

## 2018-02-10 RX ADMIN — ATORVASTATIN CALCIUM 40 MG: 40 TABLET, FILM COATED ORAL at 09:17

## 2018-02-10 RX ADMIN — IPRATROPIUM BROMIDE AND ALBUTEROL SULFATE 3 ML: .5; 3 SOLUTION RESPIRATORY (INHALATION) at 18:20

## 2018-02-10 RX ADMIN — BUMETANIDE 1 MG: 1 TABLET ORAL at 12:12

## 2018-02-10 RX ADMIN — IPRATROPIUM BROMIDE AND ALBUTEROL SULFATE 3 ML: .5; 3 SOLUTION RESPIRATORY (INHALATION) at 12:31

## 2018-02-10 NOTE — PROGRESS NOTES
2010: Bedside shift change report given to [employer] Lilly (oncoming nurse) by Billy Small (offgoing nurse). Report included the following information SBAR, Intake/Output, MAR, Accordion, Recent Results, Med Rec Status and Cardiac Rhythm AFib w/ freq PVCs. New condom catheter placed; patient had pulled off old condom cath    2100: BP still 80s/40s, will hold Bumex and give 250ml NS bolus per Dr. Stevenson Bottom orders    0130: PTT and morning labs sent after attempt x3    0530: Patient pulled off telemetry leads and condom catheter and pulled out IV. Bathed, new condom cath placed, oxygen and leads replaced, bathed, linens changed    3764: Bedside shift change report given to Frida Winslow (oncoming nurse) by [employer] Aldora (offgoing nurse). Report included the following information SBAR, Intake/Output, MAR, Accordion, Recent Results, Med Rec Status and Cardiac Rhythm AFib.

## 2018-02-10 NOTE — PROGRESS NOTES
[address] Joselin Brunson regarding placing a midline. Dr. Carla Andrews can  patient tomorrow @ 1:30 pm.  Telephone number on the white board if needed. Bedside and Verbal shift change report given to Heather Us RN (oncoming nurse) by Yoli Garcia RN (offgoing nurse). Report included the following information SBAR, Kardex, ED Summary, Procedure Summary, Intake/Output, MAR, Accordion and Cardiac Rhythm paced/LBBB.

## 2018-02-10 NOTE — PROGRESS NOTES
Cardiology Progress Note         2/10/2018 9:02 AM    Admit Date: 2/8/2018    Admit Diagnosis: SOB (shortness of breath)      Subjective: Lucie Wilson denies chest pain, dyspnea, palpitations. Past Medical History:   Diagnosis Date    Arthritis     Heart failure ([address]     Hypertension      Past Surgical History:   Procedure Laterality Date    HX PROSTATECTOMY       Social History     Social History    Marital status:      Spouse name: N/A    Number of children: N/A    Years of education: N/A     Occupational History    Not on file.      Social History Main Topics    Smoking status: Former Smoker    Smokeless tobacco: Never Used    Alcohol use No    Drug use: No    Sexual activity: Not on file     Other Topics Concern    Not on file     Social History Narrative    No narrative on file       Visit Vitals    /59 (BP 1 Location: Right arm, BP Patient Position: At rest)    Pulse 88    Temp 97.8 °F (36.6 °C)    Resp 20    Ht 5' 9\" (1.753 m)    Wt 145 lb 4.5 oz (65.9 kg)    SpO2 93%    BMI 21.45 kg/m2     Current Facility-Administered Medications   Medication Dose Route Frequency    [START ON 2/11/2018] metoprolol succinate (TOPROL-XL) XL tablet 50 mg  50 mg Oral DAILY    bumetanide (BUMEX) tablet 1 mg  1 mg Oral BID    losartan (COZAAR) tablet 12.5 mg  12.5 mg Oral DAILY    heparin 25,000 units in D5W 250 ml infusion  12-25 Units/kg/hr IntraVENous TITRATE    sodium chloride (NS) flush 5-10 mL  5-10 mL IntraVENous Q8H    sodium chloride (NS) flush 5-10 mL  5-10 mL IntraVENous PRN    acetaminophen (TYLENOL) tablet 650 mg  650 mg Oral Q4H PRN    aspirin chewable tablet 81 mg  81 mg Oral DAILY    atorvastatin (LIPITOR) tablet 40 mg  40 mg Oral DAILY    finasteride (PROSCAR) tablet 5 mg  5 mg Oral DAILY    tamsulosin (FLOMAX) capsule 0.4 mg  0.4 mg Oral DAILY    nitroglycerin (NITROSTAT) tablet 0.4 mg  0.4 mg SubLINGual Q5MIN PRN    albuterol-ipratropium (DUO-NEB) 2.5 MG-0.5 MG/3 ML  3 mL Nebulization Q4H RT    influenza vaccine 2017-18 (3 yrs+)(PF) (FLUZONE QUAD/FLUARIX QUAD) injection 0.5 mL  0.5 mL IntraMUSCular PRIOR TO DISCHARGE         Objective:      Physical Exam:  Visit Vitals    /59 (BP 1 Location: Right arm, BP Patient Position: At rest)    Pulse 88    Temp 97.8 °F (36.6 °C)    Resp 20    Ht 5' 9\" (1.753 m)    Wt 145 lb 4.5 oz (65.9 kg)    SpO2 93%    BMI 21.45 kg/m2     General Appearance:  Well developed, well nourished,alert and oriented x 3, and individual in no acute distress. Ears/Nose/Mouth/Throat:   Hearing grossly normal.         Neck: Supple. Chest:   Lungs clear to auscultation bilaterally. Cardiovascular:  irregular rhythm no murmur. Abdomen:   Soft, non-tender    Extremities: 1+ edema bilaterally. Skin: Warm and dry. Data Review:   Labs:    Recent Results (from the past 24 hour(s))   CBC WITH AUTOMATED DIFF    Collection Time: 02/09/18 11:29 AM   Result Value Ref Range    WBC 6.3 4.1 - 11.1 K/uL    RBC 3.35 (L) 4.10 - 5.70 M/uL    HGB 10.6 (L) 12.1 - 17.0 g/dL    HCT 33.1 (L) 36.6 - 50.3 %    MCV 98.8 80.0 - 99.0 FL    MCH 31.6 26.0 - 34.0 PG    MCHC 32.0 30.0 - 36.5 g/dL    RDW 16.8 (H) 11.5 - 14.5 %    PLATELET 919 742 - 479 K/uL    MPV 12.5 8.9 - 12.9 FL    NRBC 0.3 (H) 0  WBC    ABSOLUTE NRBC 0.02 (H) 0.00 - 0.01 K/uL    NEUTROPHILS 81 (H) 32 - 75 %    LYMPHOCYTES 8 (L) 12 - 49 %    MONOCYTES 10 5 - 13 %    EOSINOPHILS 0 0 - 7 %    BASOPHILS 1 0 - 1 %    IMMATURE GRANULOCYTES 0 0.0 - 0.5 %    ABS. NEUTROPHILS 5.1 1.8 - 8.0 K/UL    ABS. LYMPHOCYTES 0.5 (L) 0.8 - 3.5 K/UL    ABS. MONOCYTES 0.6 0.0 - 1.0 K/UL    ABS. EOSINOPHILS 0.0 0.0 - 0.4 K/UL    ABS. BASOPHILS 0.1 0.0 - 0.1 K/UL    ABS. IMM.  GRANS. 0.0 0.00 - 0.04 K/UL    DF SMEAR SCANNED      PLATELET COMMENTS Large Platelets      RBC COMMENTS ANISOCYTOSIS  1+        RBC COMMENTS MACROCYTOSIS  1+        RBC COMMENTS KINSEY CELLS  PRESENT       PTT Collection Time: 02/09/18  1:01 PM   Result Value Ref Range    aPTT 34.8 (H) 22.1 - 32.5 sec    aPTT, therapeutic range     58.0 - 77.0 SECS   PTT    Collection Time: 02/09/18  6:30 PM   Result Value Ref Range    aPTT 42.7 (H) 22.1 - 32.5 sec    aPTT, therapeutic range     58.0 - 77.0 SECS   MAGNESIUM    Collection Time: 02/10/18  1:18 AM   Result Value Ref Range    Magnesium 1.9 1.6 - 2.4 mg/dL   CBC WITH AUTOMATED DIFF    Collection Time: 02/10/18  1:18 AM   Result Value Ref Range    WBC 5.8 4.1 - 11.1 K/uL    RBC 3.07 (L) 4.10 - 5.70 M/uL    HGB 10.1 (L) 12.1 - 17.0 g/dL    HCT 30.6 (L) 36.6 - 50.3 %    MCV 99.7 (H) 80.0 - 99.0 FL    MCH 32.9 26.0 - 34.0 PG    MCHC 33.0 30.0 - 36.5 g/dL    RDW 16.3 (H) 11.5 - 14.5 %    PLATELET 951 (L) 940 - 400 K/uL    MPV 12.7 8.9 - 12.9 FL    NRBC 0.3 (H) 0  WBC    ABSOLUTE NRBC 0.02 (H) 0.00 - 0.01 K/uL    NEUTROPHILS 78 (H) 32 - 75 %    LYMPHOCYTES 12 12 - 49 %    MONOCYTES 8 5 - 13 %    EOSINOPHILS 0 0 - 7 %    BASOPHILS 2 (H) 0 - 1 %    IMMATURE GRANULOCYTES 0 %    ABS. NEUTROPHILS 4.5 1.8 - 8.0 K/UL    ABS. LYMPHOCYTES 0.7 (L) 0.8 - 3.5 K/UL    ABS. MONOCYTES 0.5 0.0 - 1.0 K/UL    ABS. EOSINOPHILS 0.0 0.0 - 0.4 K/UL    ABS. BASOPHILS 0.1 0.0 - 0.1 K/UL    ABS. IMM.  GRANS. 0.0 K/UL    DF MANUAL      RBC COMMENTS ANISOCYTOSIS  1+        RBC COMMENTS OVALOCYTES  PRESENT        RBC COMMENTS MACROCYTOSIS  1+        RBC COMMENTS POLYCHROMASIA  PRESENT       METABOLIC PANEL, COMPREHENSIVE    Collection Time: 02/10/18  1:18 AM   Result Value Ref Range    Sodium 142 136 - 145 mmol/L    Potassium 3.0 (L) 3.5 - 5.1 mmol/L    Chloride 102 97 - 108 mmol/L    CO2 33 (H) 21 - 32 mmol/L    Anion gap 7 5 - 15 mmol/L    Glucose 102 (H) 65 - 100 mg/dL    BUN 36 (H) 6 - 20 MG/DL    Creatinine 1.57 (H) 0.70 - 1.30 MG/DL    BUN/Creatinine ratio 23 (H) 12 - 20      GFR est AA 51 (L) >60 ml/min/1.73m2    GFR est non-AA 42 (L) >60 ml/min/1.73m2    Calcium 7.1 (L) 8.5 - 10.1 MG/DL Bilirubin, total 1.1 (H) 0.2 - 1.0 MG/DL    ALT (SGPT) 72 12 - 78 U/L    AST (SGOT) 59 (H) 15 - 37 U/L    Alk. phosphatase 91 45 - 117 U/L    Protein, total 6.1 (L) 6.4 - 8.2 g/dL    Albumin 2.3 (L) 3.5 - 5.0 g/dL    Globulin 3.8 2.0 - 4.0 g/dL    A-G Ratio 0.6 (L) 1.1 - 2.2     PTT    Collection Time: 02/10/18  1:19 AM   Result Value Ref Range    aPTT 83.5 (H) 22.1 - 32.5 sec    aPTT, therapeutic range     58.0 - 77.0 SECS       Telemetry: AFIB PVCs      Assessment/Plan     1. Acute on chronic systolic heart failure - LVEF 20% and severe MR, moderate TR  NYHA class IV on admission and class III with treatment  -continue diuresis with bumex but change 2mg IV BID to 1 mg PO bid as he has been losing good weight and urine and appears drier with renal failure  -losartan lower dose due to low BP and change coreg to Toprol XL 50mg daily and wean off cardizem IV  2. Afib with RVR  -[id number]= 6 (age, CHF, HTN, CVA)  wife/pt refuse [address] so will continue ASA 81 mg daily    3. CKD stage 3 - creatinine stable at 1.6, it was 1.6 at [facility] on 2/4/18, monitor with diuresis      Grayson Barth M.D.  [facility]  Electrophysiology/Cardiology  [facility] [place]  [address], [address]  [place], Count includes the [facility] [address]                             [address] [place] [address]  [phone]

## 2018-02-10 NOTE — PROGRESS NOTES
Problem: Mobility Impaired (Adult and Pediatric)  Goal: *Acute Goals and Plan of Care (Insert Text)  Physical Therapy Goals  Initiated 2/10/2018  1. Patient will move from supine to sit and sit to supine  in bed with supervision/set-up within 7 day(s). 2.  Patient will transfer from bed to chair and chair to bed with supervision/set-up using the least restrictive device within 7 day(s). 3.  Patient will perform sit to stand with supervision/set-up within 7 day(s). 4.  Patient will ambulate with supervision/set-up for 100 feet with the least restrictive device within 7 day(s). 5.  Patient will ascend/descend 3 stairs with 1 handrail(s) with modified independence within 7 day(s). physical Therapy EVALUATION  Patient: Don Smith (8 y.o. male)  Date: 2/10/2018  Primary Diagnosis: SOB (shortness of breath)        Precautions:        ASSESSMENT :  Based on the objective data described below, the patient presents with impaired balance, decreased, endurance, and mild confusion. Patient typically lives with his wife and uses a rolling walker at home. Upon standing he asked to ambulate to the bathroom and become incontinent of stool on the way. Noted difficulty with walker management and safety in the bathroom where he required manual and verbal cues to follow commands. When approaching the commode and asked to turn, he would twist the RW around his body without moving his feet. He was receiving HHPT services prior to admission and recommend at least this if he has good home support (patient a somewhat limited historian). Patient will benefit from skilled intervention to address the above impairments.   Patients rehabilitation potential is considered to be Good  Factors which may influence rehabilitation potential include:   [x]         None noted  []         Mental ability/status  []         Medical condition  []         Home/family situation and support systems  []         Safety awareness  [] Pain tolerance/management  []         Other:      PLAN :  Recommendations and Planned Interventions:  [x]           Bed Mobility Training             [x]    Neuromuscular Re-Education  [x]           Transfer Training                   []    Orthotic/Prosthetic Training  [x]           Gait Training                         []    Modalities  [x]           Therapeutic Exercises           []    Edema Management/Control  [x]           Therapeutic Activities            []    Patient and Family Training/Education  []           Other (comment):    Frequency/Duration: Patient will be followed by physical therapy  5 times a week to address goals. Discharge Recommendations: Rehab and Home Health  Further Equipment Recommendations for Discharge: to be determined       SUBJECTIVE:   Patient stated I need to get into the bathroom. Ivan Roper    OBJECTIVE DATA SUMMARY:   HISTORY:    Past Medical History:   Diagnosis Date    Arthritis     Heart failure ([facility] [address]     Hypertension      Past Surgical History:   Procedure Laterality Date    HX PROSTATECTOMY       Prior Level of Function/Home Situation: modified independent with a rolling walker  Personal factors and/or comorbidities impacting plan of care:     Home Situation  Home Environment: Private residence  Wheelchair Ramp: Yes  One/Two Story Residence: One story  Living Alone: No  Support Systems: Spouse/Significant Other/Partner  Patient Expects to be Discharged to[de-identified] Private residence  Current DME Used/Available at Home: Walker, rolling    EXAMINATION/PRESENTATION/DECISION MAKING:   Critical Behavior:  Neurologic State: Drowsy  Orientation Level: Oriented to person, Oriented to time  Cognition: Decreased attention/concentration, Impaired decision making, Recognition of people/places     Hearing:   Auditory  Auditory Impairment: Hard of hearing, bilateral  Skin:    Edema:   Range Of Motion:  AROM: Generally decreased, functional                       Strength:    Strength: Generally decreased, functional                    Tone & Sensation:                                  Coordination:     Vision:      Functional Mobility:  Bed Mobility:     Supine to Sit: Contact guard assistance        Transfers:  Sit to Stand: Contact guard assistance  Stand to Sit: Contact guard assistance                       Balance:   Sitting: Intact  Standing: Impaired  Standing - Static: Fair  Standing - Dynamic : Fair  Ambulation/Gait Training:  Distance (ft): 30 Feet (ft)  Assistive Device: Gait belt;Walker, rolling  Ambulation - Level of Assistance: Minimal assistance     Gait Description (WDL): Exceptions to WDL  Gait Abnormalities: Decreased step clearance;Trunk sway increased        Base of Support: Widened     Speed/Phyllis: Slow;Shuffled     Therapeutic Exercises:       Functional Measure:  Tinetti test:    Sitting Balance: 1  Arises: 1  Attempts to Rise: 1  Immediate Standing Balance: 1  Standing Balance: 1  Nudged: 1  Eyes Closed: 1  Turn 360 Degrees - Continuous/Discontinuous: 0  Turn 360 Degrees - Steady/Unsteady: 0  Sitting Down: 1  Balance Score: 8  Indication of Gait: 1  R Step Length/Height: 1  L Step Length/Height: 1  R Foot Clearance: 1  L Foot Clearance: 1  Step Symmetry: 1  Step Continuity: 1  Path: 1  Trunk: 0  Walking Time: 0  Gait Score: 8  Total Score: 16       Tinetti Test and G-code impairment scale:  Percentage of Impairment CH    0%   CI    1-19% CJ    20-39% CK    40-59% CL    60-79% CM    80-99% CN     100%   Tinetti  Score 0-28 28 23-27 17-22 12-16 6-11 1-5 0       Tinetti Tool Score Risk of Falls  <19 = High Fall Risk  19-24 = Moderate Fall Risk  25-28 = Low Fall Risk  Tinetti ME. Performance-Oriented Assessment of Mobility Problems in Elderly Patients. [place] [address]; [id number].  (Scoring Description: PT Bulletin Feb. 10, 1993)    Older adults: Darshan Nicholson et al, 2009; n = [address] [employer] elderly evaluated with ABC, MARIANO, ADL, and IADL)  · Mean MARIANO score for males aged 69-68 years = 26.21(3.40)  · Mean MARIANO score for females age 69-68 years = 25.16(4.30)  · Mean MARIANO score for males over 80 years = 23.29(6.02)  · Mean MARIANO score for females over 80 years = 17.20(8.32)         G codes: In compliance with CMSs Claims Based Outcome Reporting, the following G-code set was chosen for this patient based on their primary functional limitation being treated: The outcome measure chosen to determine the severity of the functional limitation was the Tinetti with a score of 16/28 which was correlated with the impairment scale. ? Mobility - Walking and Moving Around:     - CURRENT STATUS: CK - 40%-59% impaired, limited or restricted    - GOAL STATUS: CJ - 20%-39% impaired, limited or restricted    - D/C STATUS:  ---------------To be determined---------------      Physical Therapy Evaluation Charge Determination   History Examination Presentation Decision-Making   MEDIUM  Complexity : 1-2 comorbidities / personal factors will impact the outcome/ POC  MEDIUM Complexity : 3 Standardized tests and measures addressing body structure, function, activity limitation and / or participation in recreation  LOW Complexity : Stable, uncomplicated  LOW Complexity : FOTO score of       Based on the above components, the patient evaluation is determined to be of the following complexity level: MEDIUM    Pain:  Pain Scale 1: Numeric (0 - 10)  Pain Intensity 1: 0              Activity Tolerance:     Please refer to the flowsheet for vital signs taken during this treatment. After treatment:   [x]         Patient left in no apparent distress sitting up in chair  []         Patient left in no apparent distress in bed  [x]         Call bell left within reach  [x]         Nursing notified  []         Caregiver present  []         Bed alarm activated    COMMUNICATION/EDUCATION:   The patients plan of care was discussed with: Registered Nurse.   [x]         Fall prevention education was provided and the patient/caregiver indicated understanding. [x]         Patient/family have participated as able in goal setting and plan of care. [x]         Patient/family agree to work toward stated goals and plan of care. []         Patient understands intent and goals of therapy, but is neutral about his/her participation. []         Patient is unable to participate in goal setting and plan of care.     Thank you for this referral.  Debbi Khalil, PT, DPT   Time Calculation: 36 mins

## 2018-02-10 NOTE — PROGRESS NOTES
Spoke to the patient's wife Miss Kelli Contreras and told her that the patient is quite stable and most likely would be released tomorrow. She wanted to me keep the patient till Tuesday so that she can arrange somebody to bring the patient home and \"arrange things at home\". I told her most likely would not be able to keep the patient in the hospital just because of that. She was not happy about that.

## 2018-02-10 NOTE — PROGRESS NOTES
Hospitalist Progress Note  Tasneem Alford MD  Answering service: [phone] OR [phone] from in house phone  Cell: [phone]      Date of Service:  2/10/2018  NAME:  Lucie Wilson  :  1931  MRN:  [id number]      Admission Summary:   The patient is an 51-year-old male with past medical history of CHF, hypertension, DVT, AAA, atherosclerosis, right inguinal hernia, cholelithiasis, BPH, who presents to the hospital complaining of SOB    Interval history / Subjective:     F/u SOB  Feels better now  No SOB, chest pain, cough     Assessment & Plan:     Acute on chronic systolic CHF, NYHA class IV on admission, NYHA III today  -Echo with EF 20% with severe MR and moderate TR  -On IV diuresis switched to oral 2/10  -Strict I/Os, continue clinton  -Daily weight  -Appreciate Cardiology input  -On ASA/Lipitor/Losartan/Metoprolol XL    A fib with RVR  -Was on cardizem drip, off on 2/10  -Continue Metoprolol  -On IV heparin, family still refusing oral anticoagulation  -ASA started     Chest pain with a history of DVT  -V/Q scan low probability for PE  -D dimer 7.8  -Troponin elevated  -Cardiology on board    CKD stage III  -stable  -monitor    History of AAA  -5.2 cm in diameter per US  -Outpatient follow up    Hypokalemia  -replace as needed    Transaminitis  -US abd  A mildly echogenic and heterogeneous liver can be seen with steatosis or  other nonspecific parenchymal liver disease. There is no focal liver mass. Gallstones without biliary duct dilatation  -On Statin, will continue as repeat CMP improved  -Monitor    History of CVA  -on ASA/Statin    History of concussion  -The wife claims that the patient gets \"more confused at a strange place\".  She says that the patient does not have Dementia    BPH  -on Flomax/Proscar    Cardiac diet    PT/OT awaited    Code status: FULL CODE  DVT prophylaxis: Heparin  PTA: home with wife (usually goes to [place] [facility] but was on diversion)    Plan: Continue diuresis, follow cardiology    Care Plan discussed with: Patient/Family Spoke to the wife on phone and she does not want the patient to go to any rehab. Disposition: TBD     Hospital Problems  Date Reviewed: 2/8/2018          Codes Class Noted POA    * (Principal)SOB (shortness of breath) ICD-10-CM: R06.02  ICD-9-CM: 786.05  2/8/2018 Unknown                Review of Systems:   A comprehensive review of systems was negative except for that written in the HPI. Vital Signs:    Last 24hrs VS reviewed since prior progress note. Most recent are:  Visit Vitals    /59 (BP 1 Location: Right arm, BP Patient Position: At rest)    Pulse 88    Temp 97.8 °F (36.6 °C)    Resp 20    Ht 5' 9\" (1.753 m)    Wt 65.9 kg (145 lb 4.5 oz)    SpO2 93%    BMI 21.45 kg/m2         Intake/Output Summary (Last 24 hours) at 02/10/18 0851  Last data filed at 02/09/18 2325   Gross per 24 hour   Intake             1780 ml   Output             1875 ml   Net              -95 ml        Physical Examination:             Constitutional:  No acute distress, cooperative, pleasant    ENT:  Oral mucous moist, oropharynx benign. Neck supple,    Resp:  CTA bilaterally. No wheezing/rhonchi/rales. No accessory muscle use   CV:  Regular rhythm, normal rate, no murmurs, gallops, rubs    GI:  Soft, non distended, non tender. normoactive bowel sounds, no hepatosplenomegaly     Musculoskeletal:  No edema, warm, 2+ pulses throughout    Neurologic:  Moves all extremities.   AAOx3, CN II-XII reviewed     Skin:  Good turgor, no rashes or ulcers       Data Review:    Review and/or order of clinical lab test      Labs:     Recent Labs      02/10/18   0118  02/09/18   1129   WBC  5.8  6.3   HGB  10.1*  10.6*   HCT  30.6*  33.1*   PLT  121*  178     Recent Labs      02/10/18   0118  02/09/18   0146  02/08/18   1805  02/08/18   1256   NA  142  145   --   146*   K  3.0*  4.0   --   3.2*   CL  102  106   --   106   CO2 33*  30   --   29   BUN  36*  33*   --   28*   CREA  1.57*  1.60*   --   1.56*   GLU  102*  107*   --   112*   CA  7.1*  7.9*   --   8.0*   MG  1.9  2.0  2.0  1.7     Recent Labs      02/10/18   0118  02/08/18   1256   SGOT  59*  105*   ALT  72  110*   AP  91  121*   TBILI  1.1*  1.5*   TP  6.1*  7.8   ALB  2.3*  3.4*   GLOB  3.8  4.4*     Recent Labs      02/10/18   0119  02/09/18   1830  02/09/18   1301   APTT  83.5*  42.7*  34.8*      No results for input(s): FE, TIBC, PSAT, FERR in the last 72 hours. No results found for: FOL, RBCF   No results for input(s): PH, PCO2, PO2 in the last 72 hours.   Recent Labs      02/08/18 2014 02/08/18   1621  02/08/18   1256   TROIQ  0.32*  0.11*  0.10*     Lab Results   Component Value Date/Time    Cholesterol, total 116 02/08/2018 06:05 PM    HDL Cholesterol 35 02/08/2018 06:05 PM    LDL, calculated 63.6 02/08/2018 06:05 PM    Triglyceride 87 02/08/2018 06:05 PM    CHOL/HDL Ratio 3.3 02/08/2018 06:05 PM     No results found for: GLUCPOC  No results found for: COLOR, APPRN, SPGRU, REFSG, SERGIO, PROTU, GLUCU, KETU, BILU, UROU, MARCELA, LEUKU, GLUKE, EPSU, BACTU, WBCU, RBCU, CASTS, UCRY      Medications Reviewed:     Current Facility-Administered Medications   Medication Dose Route Frequency    losartan (COZAAR) tablet 12.5 mg  12.5 mg Oral DAILY    metoprolol succinate (TOPROL-XL) XL tablet 25 mg  25 mg Oral DAILY    heparin 25,000 units in D5W 250 ml infusion  12-25 Units/kg/hr IntraVENous TITRATE    bumetanide (BUMEX) injection 2 mg  2 mg IntraVENous Q12H    sodium chloride (NS) flush 5-10 mL  5-10 mL IntraVENous Q8H    sodium chloride (NS) flush 5-10 mL  5-10 mL IntraVENous PRN    acetaminophen (TYLENOL) tablet 650 mg  650 mg Oral Q4H PRN    aspirin chewable tablet 81 mg  81 mg Oral DAILY    atorvastatin (LIPITOR) tablet 40 mg  40 mg Oral DAILY    finasteride (PROSCAR) tablet 5 mg  5 mg Oral DAILY    tamsulosin (FLOMAX) capsule 0.4 mg  0.4 mg Oral DAILY    nitroglycerin (NITROSTAT) tablet 0.4 mg  0.4 mg SubLINGual Q5MIN PRN    albuterol-ipratropium (DUO-NEB) 2.5 MG-0.5 MG/3 ML  3 mL Nebulization Q4H RT    influenza vaccine 2017-18 (3 yrs+)(PF) (FLUZONE QUAD/FLUARIX QUAD) injection 0.5 mL  0.5 mL IntraMUSCular PRIOR TO DISCHARGE    dilTIAZem (CARDIZEM) 125 mg in dextrose 5% 125 mL infusion  2.5 mg/hr IntraVENous CONTINUOUS     ______________________________________________________________________  EXPECTED LENGTH OF STAY: 2d 12h  ACTUAL LENGTH OF STAY:          2                 Rehana Ramos MD

## 2018-02-11 LAB
ALBUMIN SERPL-MCNC: 2.4 G/DL (ref 3.5–5)
ALBUMIN/GLOB SERPL: 0.6 {RATIO} (ref 1.1–2.2)
ALP SERPL-CCNC: 101 U/L (ref 45–117)
ALT SERPL-CCNC: 63 U/L (ref 12–78)
ANION GAP SERPL CALC-SCNC: 10 MMOL/L (ref 5–15)
APTT PPP: 54.2 SEC (ref 22.1–32.5)
APTT PPP: 68.2 SEC (ref 22.1–32.5)
AST SERPL-CCNC: 61 U/L (ref 15–37)
BASOPHILS # BLD: 0 K/UL (ref 0–0.1)
BASOPHILS NFR BLD: 1 % (ref 0–1)
BILIRUB SERPL-MCNC: 1 MG/DL (ref 0.2–1)
BUN SERPL-MCNC: 38 MG/DL (ref 6–20)
BUN/CREAT SERPL: 26 (ref 12–20)
CALCIUM SERPL-MCNC: 7.1 MG/DL (ref 8.5–10.1)
CHLORIDE SERPL-SCNC: 101 MMOL/L (ref 97–108)
CO2 SERPL-SCNC: 28 MMOL/L (ref 21–32)
CREAT SERPL-MCNC: 1.48 MG/DL (ref 0.7–1.3)
DIFFERENTIAL METHOD BLD: ABNORMAL
EOSINOPHIL # BLD: 0 K/UL (ref 0–0.4)
EOSINOPHIL NFR BLD: 1 % (ref 0–7)
ERYTHROCYTE [DISTWIDTH] IN BLOOD BY AUTOMATED COUNT: 16 % (ref 11.5–14.5)
GLOBULIN SER CALC-MCNC: 3.9 G/DL (ref 2–4)
GLUCOSE SERPL-MCNC: 97 MG/DL (ref 65–100)
HCT VFR BLD AUTO: 32.1 % (ref 36.6–50.3)
HGB BLD-MCNC: 10.8 G/DL (ref 12.1–17)
IMM GRANULOCYTES # BLD: 0 K/UL (ref 0–0.04)
IMM GRANULOCYTES NFR BLD AUTO: 0 % (ref 0–0.5)
LYMPHOCYTES # BLD: 1 K/UL (ref 0.8–3.5)
LYMPHOCYTES NFR BLD: 20 % (ref 12–49)
MAGNESIUM SERPL-MCNC: 1.8 MG/DL (ref 1.6–2.4)
MCH RBC QN AUTO: 33.1 PG (ref 26–34)
MCHC RBC AUTO-ENTMCNC: 33.6 G/DL (ref 30–36.5)
MCV RBC AUTO: 98.5 FL (ref 80–99)
MONOCYTES # BLD: 0.5 K/UL (ref 0–1)
MONOCYTES NFR BLD: 9 % (ref 5–13)
NEUTS SEG # BLD: 3.3 K/UL (ref 1.8–8)
NEUTS SEG NFR BLD: 69 % (ref 32–75)
NRBC # BLD: 0 K/UL (ref 0–0.01)
NRBC BLD-RTO: 0 PER 100 WBC
PLATELET # BLD AUTO: 120 K/UL (ref 150–400)
PMV BLD AUTO: 12.6 FL (ref 8.9–12.9)
POTASSIUM SERPL-SCNC: 3.5 MMOL/L (ref 3.5–5.1)
PROT SERPL-MCNC: 6.3 G/DL (ref 6.4–8.2)
RBC # BLD AUTO: 3.26 M/UL (ref 4.1–5.7)
SODIUM SERPL-SCNC: 139 MMOL/L (ref 136–145)
THERAPEUTIC RANGE,PTTT: ABNORMAL SECS (ref 58–77)
THERAPEUTIC RANGE,PTTT: ABNORMAL SECS (ref 58–77)
WBC # BLD AUTO: 4.8 K/UL (ref 4.1–11.1)

## 2018-02-11 PROCEDURE — 80053 COMPREHEN METABOLIC PANEL: CPT | Performed by: HOSPITALIST

## 2018-02-11 PROCEDURE — 74011250637 HC RX REV CODE- 250/637: Performed by: FAMILY MEDICINE

## 2018-02-11 PROCEDURE — 74011250637 HC RX REV CODE- 250/637: Performed by: NURSE PRACTITIONER

## 2018-02-11 PROCEDURE — 74011250637 HC RX REV CODE- 250/637: Performed by: INTERNAL MEDICINE

## 2018-02-11 PROCEDURE — 85730 THROMBOPLASTIN TIME PARTIAL: CPT | Performed by: HOSPITALIST

## 2018-02-11 PROCEDURE — 94640 AIRWAY INHALATION TREATMENT: CPT

## 2018-02-11 PROCEDURE — 36415 COLL VENOUS BLD VENIPUNCTURE: CPT | Performed by: NURSE PRACTITIONER

## 2018-02-11 PROCEDURE — 65660000000 HC RM CCU STEPDOWN

## 2018-02-11 PROCEDURE — 74011000250 HC RX REV CODE- 250: Performed by: FAMILY MEDICINE

## 2018-02-11 PROCEDURE — 85730 THROMBOPLASTIN TIME PARTIAL: CPT | Performed by: NURSE PRACTITIONER

## 2018-02-11 PROCEDURE — 85025 COMPLETE CBC W/AUTO DIFF WBC: CPT | Performed by: HOSPITALIST

## 2018-02-11 PROCEDURE — 83735 ASSAY OF MAGNESIUM: CPT | Performed by: HOSPITALIST

## 2018-02-11 RX ORDER — IPRATROPIUM BROMIDE AND ALBUTEROL SULFATE 2.5; .5 MG/3ML; MG/3ML
3 SOLUTION RESPIRATORY (INHALATION)
Status: DISCONTINUED | OUTPATIENT
Start: 2018-02-11 | End: 2018-02-11

## 2018-02-11 RX ORDER — IPRATROPIUM BROMIDE AND ALBUTEROL SULFATE 2.5; .5 MG/3ML; MG/3ML
3 SOLUTION RESPIRATORY (INHALATION)
Status: DISCONTINUED | OUTPATIENT
Start: 2018-02-12 | End: 2018-02-13 | Stop reason: HOSPADM

## 2018-02-11 RX ADMIN — FINASTERIDE 5 MG: 5 TABLET, FILM COATED ORAL at 08:22

## 2018-02-11 RX ADMIN — ATORVASTATIN CALCIUM 40 MG: 40 TABLET, FILM COATED ORAL at 08:22

## 2018-02-11 RX ADMIN — IPRATROPIUM BROMIDE AND ALBUTEROL SULFATE 3 ML: .5; 3 SOLUTION RESPIRATORY (INHALATION) at 03:14

## 2018-02-11 RX ADMIN — BUMETANIDE 1 MG: 1 TABLET ORAL at 18:00

## 2018-02-11 RX ADMIN — IPRATROPIUM BROMIDE AND ALBUTEROL SULFATE 3 ML: .5; 3 SOLUTION RESPIRATORY (INHALATION) at 12:30

## 2018-02-11 RX ADMIN — METOPROLOL SUCCINATE 50 MG: 50 TABLET, EXTENDED RELEASE ORAL at 08:22

## 2018-02-11 RX ADMIN — LOSARTAN POTASSIUM 12.5 MG: 25 TABLET ORAL at 08:22

## 2018-02-11 RX ADMIN — Medication 10 ML: at 14:00

## 2018-02-11 RX ADMIN — IPRATROPIUM BROMIDE AND ALBUTEROL SULFATE 3 ML: .5; 3 SOLUTION RESPIRATORY (INHALATION) at 08:05

## 2018-02-11 RX ADMIN — ASPIRIN 81 MG 81 MG: 81 TABLET ORAL at 08:22

## 2018-02-11 RX ADMIN — TAMSULOSIN HYDROCHLORIDE 0.4 MG: 0.4 CAPSULE ORAL at 08:22

## 2018-02-11 RX ADMIN — BUMETANIDE 1 MG: 1 TABLET ORAL at 08:22

## 2018-02-11 RX ADMIN — Medication 10 ML: at 05:00

## 2018-02-11 RX ADMIN — IPRATROPIUM BROMIDE AND ALBUTEROL SULFATE 3 ML: .5; 3 SOLUTION RESPIRATORY (INHALATION) at 00:14

## 2018-02-11 NOTE — PROGRESS NOTES
2000 PTT drawn. 2130 PTT 61.2, no rate change. 0330 PTT drawn. 5243 called lab inquiring about PTT, said it would be 15 more minutes. 0615 PTT resulted at 68.2.     0730 Bedside shift change report given to KACEY Matias (oncoming nurse) by RUTH ANN Gonzalez (offgoing nurse). Report included the following information SBAR, Procedure Summary, Intake/Output, MAR and Recent Results.

## 2018-02-11 NOTE — PROGRESS NOTES
Hospitalist Progress Note  Macey Deleon MD  Answering service: [phone] from in house phone  Cell: [phone]      Date of Service:  2018  NAME:  Maco Pantoja  :  1931  MRN:  [id number]      Admission Summary:   The patient is an 68-year-old male with past medical history of CHF, hypertension, DVT, AAA, atherosclerosis, right inguinal hernia, cholelithiasis, BPH, who presents to the hospital complaining of SOB    Interval history / Subjective:     F/u SOB  Feels better now  No SOB, chest pain, cough     Assessment & Plan:     Acute on chronic systolic CHF, NYHA class IV on admission, NYHA III today  -Echo with EF 20% with severe MR and moderate TR  -On IV diuresis switched to oral 2/10  -Strict I/Os, continue clinton  -Daily weight  -Appreciate Cardiology input  -On ASA/Lipitor/Losartan/Metoprolol XL    A fib with RVR  -Was on cardizem drip, off on 2/10  -Continue Metoprolol  -On IV heparin, family still refusing oral anticoagulation  -ASA started     Chest pain with a history of DVT  -V/Q scan low probability for PE  -D dimer 7.8  -Troponin elevated  -Cardiology on board    CKD stage III  -stable  -monitor    History of AAA  -5.2 cm in diameter per US  -Outpatient follow up    Hypokalemia  -replace as needed    Transaminitis  -US abd  A mildly echogenic and heterogeneous liver can be seen with steatosis or  other nonspecific parenchymal liver disease. There is no focal liver mass. Gallstones without biliary duct dilatation  -On Statin, will continue as repeat CMP improved  -Monitor    History of CVA  -on ASA/Statin    History of concussion  -The wife claims that the patient gets \"more confused at a strange place\".  She says that the patient does not have Dementia    BPH  -on Flomax/Proscar    Cardiac diet    PT/OT awaited    Code status: FULL CODE  DVT prophylaxis: Heparin  PTA: home with wife (usually goes to [place] but was on diversion)    Plan: Continue diuresis, follow cardiology. Discharge tomorrow    Care Plan discussed with: Patient/Family Spoke to the wife on phone and she does not want the patient to go to any rehab. Disposition: TBD     Hospital Problems  Date Reviewed: 2/8/2018          Codes Class Noted POA    * (Principal)SOB (shortness of breath) ICD-10-CM: R06.02  ICD-9-CM: 786.05  2/8/2018 Unknown                Review of Systems:   A comprehensive review of systems was negative except for that written in the HPI. Vital Signs:    Last 24hrs VS reviewed since prior progress note. Most recent are:  Visit Vitals    /66 (BP 1 Location: Left arm, BP Patient Position: At rest)    Pulse 94    Temp 98.6 °F (37 °C)    Resp 20    Ht 5' 9\" (1.753 m)    Wt 72.6 kg (160 lb)    SpO2 97%    BMI 23.63 kg/m2         Intake/Output Summary (Last 24 hours) at 02/11/18 1715  Last data filed at 02/11/18 1530   Gross per 24 hour   Intake           819.45 ml   Output             1551 ml   Net          -731.55 ml        Physical Examination:             Constitutional:  No acute distress, cooperative, pleasant    ENT:  Oral mucous moist, oropharynx benign. Neck supple,    Resp:  CTA bilaterally. No wheezing/rhonchi/rales. No accessory muscle use   CV:  Regular rhythm, normal rate, no murmurs, gallops, rubs    GI:  Soft, non distended, non tender. normoactive bowel sounds, no hepatosplenomegaly     Musculoskeletal:  No edema, warm, 2+ pulses throughout    Neurologic:  Moves all extremities.   AAOx3, CN II-XII reviewed     Skin:  Good turgor, no rashes or ulcers       Data Review:    Review and/or order of clinical lab test      Labs:     Recent Labs      02/11/18   0325  02/10/18   0118   WBC  4.8  5.8   HGB  10.8*  10.1*   HCT  32.1*  30.6*   PLT  120*  121*     Recent Labs      02/11/18   0325  02/10/18   0118  02/09/18   0146   NA  139  142  145   K  3.5  3.0*  4.0   CL  101  102  106   CO2  28  33*  30   BUN  38*  36* 33*   CREA  1.48*  1.57*  1.60*   GLU  97  102*  107*   CA  7.1*  7.1*  7.9*   MG  1.8  1.9  2.0     Recent Labs      02/11/18   0325  02/10/18   0118   SGOT  61*  59*   ALT  63  72   AP  101  91   TBILI  1.0  1.1*   TP  6.3*  6.1*   ALB  2.4*  2.3*   GLOB  3.9  3.8     Recent Labs      02/11/18   0325  02/10/18   2004  02/10/18   1142   APTT  68.2*  61.2*  56.3*      No results for input(s): FE, TIBC, PSAT, FERR in the last 72 hours. No results found for: FOL, RBCF   No results for input(s): PH, PCO2, PO2 in the last 72 hours.   Recent Labs      02/08/18 2014   TROIQ  0.32*     Lab Results   Component Value Date/Time    Cholesterol, total 116 02/08/2018 06:05 PM    HDL Cholesterol 35 02/08/2018 06:05 PM    LDL, calculated 63.6 02/08/2018 06:05 PM    Triglyceride 87 02/08/2018 06:05 PM    CHOL/HDL Ratio 3.3 02/08/2018 06:05 PM     No results found for: GLUCPOC  No results found for: COLOR, APPRN, SPGRU, REFSG, SERGIO, PROTU, GLUCU, KETU, BILU, UROU, MARCELA, LEUKU, GLUKE, EPSU, BACTU, WBCU, RBCU, CASTS, UCRY      Medications Reviewed:     Current Facility-Administered Medications   Medication Dose Route Frequency    albuterol-ipratropium (DUO-NEB) 2.5 MG-0.5 MG/3 ML  3 mL Nebulization Q6H RT    metoprolol succinate (TOPROL-XL) XL tablet 50 mg  50 mg Oral DAILY    bumetanide (BUMEX) tablet 1 mg  1 mg Oral BID    losartan (COZAAR) tablet 12.5 mg  12.5 mg Oral DAILY    heparin 25,000 units in D5W 250 ml infusion  12-25 Units/kg/hr IntraVENous TITRATE    sodium chloride (NS) flush 5-10 mL  5-10 mL IntraVENous Q8H    sodium chloride (NS) flush 5-10 mL  5-10 mL IntraVENous PRN    acetaminophen (TYLENOL) tablet 650 mg  650 mg Oral Q4H PRN    aspirin chewable tablet 81 mg  81 mg Oral DAILY    atorvastatin (LIPITOR) tablet 40 mg  40 mg Oral DAILY    finasteride (PROSCAR) tablet 5 mg  5 mg Oral DAILY    tamsulosin (FLOMAX) capsule 0.4 mg  0.4 mg Oral DAILY    nitroglycerin (NITROSTAT) tablet 0.4 mg  0.4 mg SubLINGual Q5MIN PRN    influenza vaccine 2017-18 (3 yrs+)(PF) (FLUZONE QUAD/FLUARIX QUAD) injection 0.5 mL  0.5 mL IntraMUSCular PRIOR TO DISCHARGE     ______________________________________________________________________  EXPECTED LENGTH OF STAY: 2d 12h  ACTUAL LENGTH OF STAY:          Rose Marion MD

## 2018-02-11 NOTE — PROGRESS NOTES
Cardiology Progress Note         2/11/2018 9:02 AM    Admit Date: 2/8/2018    Admit Diagnosis: SOB (shortness of breath)      Subjective: Marcelino Juarez denies chest pain, dyspnea, palpitations. He is off [place] oxygen now  He still has clinton catheter in and is not walking yet    Past Medical History:   Diagnosis Date    Arthritis     Heart failure ([address]     Hypertension      Past Surgical History:   Procedure Laterality Date    HX PROSTATECTOMY       Social History     Social History    Marital status:      Spouse name: N/A    Number of children: N/A    Years of education: N/A     Occupational History    Not on file.      Social History Main Topics    Smoking status: Former Smoker    Smokeless tobacco: Never Used    Alcohol use No    Drug use: No    Sexual activity: Not on file     Other Topics Concern    Not on file     Social History Narrative    No narrative on file       Visit Vitals    /69 (BP 1 Location: Left arm, BP Patient Position: At rest)    Pulse (!) 105    Temp 98.5 °F (36.9 °C)    Resp 20    Ht 5' 9\" (1.753 m)    Wt 160 lb (72.6 kg)    SpO2 97%    BMI 23.63 kg/m2     Current Facility-Administered Medications   Medication Dose Route Frequency    metoprolol succinate (TOPROL-XL) XL tablet 50 mg  50 mg Oral DAILY    bumetanide (BUMEX) tablet 1 mg  1 mg Oral BID    losartan (COZAAR) tablet 12.5 mg  12.5 mg Oral DAILY    heparin 25,000 units in D5W 250 ml infusion  12-25 Units/kg/hr IntraVENous TITRATE    sodium chloride (NS) flush 5-10 mL  5-10 mL IntraVENous Q8H    sodium chloride (NS) flush 5-10 mL  5-10 mL IntraVENous PRN    acetaminophen (TYLENOL) tablet 650 mg  650 mg Oral Q4H PRN    aspirin chewable tablet 81 mg  81 mg Oral DAILY    atorvastatin (LIPITOR) tablet 40 mg  40 mg Oral DAILY    finasteride (PROSCAR) tablet 5 mg  5 mg Oral DAILY    tamsulosin (FLOMAX) capsule 0.4 mg  0.4 mg Oral DAILY    nitroglycerin (NITROSTAT) tablet 0.4 mg  0.4 mg SubLINGual Q5MIN PRN    albuterol-ipratropium (DUO-NEB) 2.5 MG-0.5 MG/3 ML  3 mL Nebulization Q4H RT    influenza vaccine 2017-18 (3 yrs+)(PF) (FLUZONE QUAD/FLUARIX QUAD) injection 0.5 mL  0.5 mL IntraMUSCular PRIOR TO DISCHARGE         Objective:      Physical Exam:  Visit Vitals    /69 (BP 1 Location: Left arm, BP Patient Position: At rest)    Pulse (!) 105    Temp 98.5 °F (36.9 °C)    Resp 20    Ht 5' 9\" (1.753 m)    Wt 160 lb (72.6 kg)    SpO2 97%    BMI 23.63 kg/m2     General Appearance:  Well developed, well nourished,alert and oriented x 3, and individual in no acute distress. Ears/Nose/Mouth/Throat:   Hearing grossly normal.         Neck: Supple. Chest:   Lungs with crackles to auscultation bilaterally. Cardiovascular:  irregular rhythm no murmur. Abdomen:   Soft, non-tender    Extremities: 1+ edema bilaterally. Skin: Warm and dry. Data Review:   Labs:    Recent Results (from the past 24 hour(s))   PTT    Collection Time: 02/10/18  8:04 PM   Result Value Ref Range    aPTT 61.2 (H) 22.1 - 32.5 sec    aPTT, therapeutic range     58.0 - 77.0 SECS   PTT    Collection Time: 02/11/18  3:25 AM   Result Value Ref Range    aPTT 68.2 (H) 22.1 - 32.5 sec    aPTT, therapeutic range     58.0 - 77.0 SECS   CBC WITH AUTOMATED DIFF    Collection Time: 02/11/18  3:25 AM   Result Value Ref Range    WBC 4.8 4.1 - 11.1 K/uL    RBC 3.26 (L) 4.10 - 5.70 M/uL    HGB 10.8 (L) 12.1 - 17.0 g/dL    HCT 32.1 (L) 36.6 - 50.3 %    MCV 98.5 80.0 - 99.0 FL    MCH 33.1 26.0 - 34.0 PG    MCHC 33.6 30.0 - 36.5 g/dL    RDW 16.0 (H) 11.5 - 14.5 %    PLATELET 329 (L) 616 - 400 K/uL    MPV 12.6 8.9 - 12.9 FL    NRBC 0.0 0  WBC    ABSOLUTE NRBC 0.00 0.00 - 0.01 K/uL    NEUTROPHILS 69 32 - 75 %    LYMPHOCYTES 20 12 - 49 %    MONOCYTES 9 5 - 13 %    EOSINOPHILS 1 0 - 7 %    BASOPHILS 1 0 - 1 %    IMMATURE GRANULOCYTES 0 0.0 - 0.5 %    ABS. NEUTROPHILS 3.3 1.8 - 8.0 K/UL    ABS.  LYMPHOCYTES 1.0 0.8 - 3.5 K/UL    ABS. MONOCYTES 0.5 0.0 - 1.0 K/UL    ABS. EOSINOPHILS 0.0 0.0 - 0.4 K/UL    ABS. BASOPHILS 0.0 0.0 - 0.1 K/UL    ABS. IMM. GRANS. 0.0 0.00 - 0.04 K/UL    DF AUTOMATED     METABOLIC PANEL, COMPREHENSIVE    Collection Time: 02/11/18  3:25 AM   Result Value Ref Range    Sodium 139 136 - 145 mmol/L    Potassium 3.5 3.5 - 5.1 mmol/L    Chloride 101 97 - 108 mmol/L    CO2 28 21 - 32 mmol/L    Anion gap 10 5 - 15 mmol/L    Glucose 97 65 - 100 mg/dL    BUN 38 (H) 6 - 20 MG/DL    Creatinine 1.48 (H) 0.70 - 1.30 MG/DL    BUN/Creatinine ratio 26 (H) 12 - 20      GFR est AA 55 (L) >60 ml/min/1.73m2    GFR est non-AA 45 (L) >60 ml/min/1.73m2    Calcium 7.1 (L) 8.5 - 10.1 MG/DL    Bilirubin, total 1.0 0.2 - 1.0 MG/DL    ALT (SGPT) 63 12 - 78 U/L    AST (SGOT) 61 (H) 15 - 37 U/L    Alk. phosphatase 101 45 - 117 U/L    Protein, total 6.3 (L) 6.4 - 8.2 g/dL    Albumin 2.4 (L) 3.5 - 5.0 g/dL    Globulin 3.9 2.0 - 4.0 g/dL    A-G Ratio 0.6 (L) 1.1 - 2.2     MAGNESIUM    Collection Time: 02/11/18  3:25 AM   Result Value Ref Range    Magnesium 1.8 1.6 - 2.4 mg/dL       Telemetry: AFIB PVCs, triplets      Assessment/Plan     1. Acute on chronic systolic heart failure - LVEF 20% and severe MR, moderate TR  NYHA class IV on admission and class III with treatment but need physical therapy tomorrow and get oxygen sat with ambulation before he can go home  I spoke to his nephew today  -continue diuresis with bumex but change 2mg IV BID to 1 mg PO bid    Need to try urination without clinton  Weight and urine output do not match    -losartan lower dose due to low fluctuating BP and change coreg to Toprol XL 50mg daily and off cardizem IV  2. Afib with RVR  -[id number]= 6 (age, CHF, HTN, CVA)  wife/pt refuse [address] so will continue ASA 81 mg daily    3. CKD stage 3 - creatinine stable at 1.6, it was 1.6 at [facility] on 2/4/18, monitor with PO diuresis    Dr Montserrat Moran will assume care  He is high risk for readmission   I discussed with Dr Shadi Florez. Jamia Burgess M.D.  [facility]  Electrophysiology/Cardiology  [facility]  [address] , [facility] [address] [place] [address]  [phone]

## 2018-02-11 NOTE — PROGRESS NOTES
0730 Bedside shift change report given to [facility], RN  (oncoming nurse) by JENNIFER Solo (offgoing nurse). Report included the following information SBAR, Kardex, ED Summary, Procedure Summary, Intake/Output, MAR, Recent Results and Med Rec Status. 1100 DC heparin drip per Dr. Celso Jenkins    56 Called wife to give update, patient is not being discharged. 1530 Restarted heparin drip per Dr Yehuda Gowers Bedside shift change report given to Tess Deng (oncoming nurse) by [facility], RN (offgoing nurse). Report included the following information SBAR, Kardex, Procedure Summary, Intake/Output, MAR, Recent Results and Med Rec Status. Problem: Falls - Risk of  Goal: *Absence of Falls  Document [place] Fall Risk and appropriate interventions in the flowsheet. Outcome: Progressing Towards Goal  Fall Risk Interventions:  Mobility Interventions: Patient to call before getting OOB    Mentation Interventions: Bed/chair exit alarm    Medication Interventions: Patient to call before getting OOB    Elimination Interventions: Call light in reach         Problem: Patient Education: Go to Patient Education Activity  Goal: Patient/Family Education  Outcome: Progressing Towards Goal  Patient is free from falls. Problem: Pressure Injury - Risk of  Goal: *Prevention of pressure ulcer  Outcome: Progressing Towards Goal  Patient moves from side to side with ease.

## 2018-02-11 NOTE — PROGRESS NOTES
Spiritual Care Partner Volunteer visited patient in Rm 452 on 2/11/2018.   Documented by:  Chaplain Schreiber MDiv, MS, [facility]  [phone] ([phone])

## 2018-02-12 LAB
ANION GAP SERPL CALC-SCNC: 7 MMOL/L (ref 5–15)
APTT PPP: 59.1 SEC (ref 22.1–32.5)
APTT PPP: 71 SEC (ref 22.1–32.5)
BUN SERPL-MCNC: 33 MG/DL (ref 6–20)
BUN/CREAT SERPL: 24 (ref 12–20)
CALCIUM SERPL-MCNC: 7.5 MG/DL (ref 8.5–10.1)
CHLORIDE SERPL-SCNC: 100 MMOL/L (ref 97–108)
CO2 SERPL-SCNC: 30 MMOL/L (ref 21–32)
CREAT SERPL-MCNC: 1.39 MG/DL (ref 0.7–1.3)
GLUCOSE SERPL-MCNC: 88 MG/DL (ref 65–100)
MAGNESIUM SERPL-MCNC: 1.8 MG/DL (ref 1.6–2.4)
POTASSIUM SERPL-SCNC: 3.6 MMOL/L (ref 3.5–5.1)
SODIUM SERPL-SCNC: 137 MMOL/L (ref 136–145)
THERAPEUTIC RANGE,PTTT: ABNORMAL SECS (ref 58–77)
THERAPEUTIC RANGE,PTTT: ABNORMAL SECS (ref 58–77)

## 2018-02-12 PROCEDURE — 74011250637 HC RX REV CODE- 250/637: Performed by: FAMILY MEDICINE

## 2018-02-12 PROCEDURE — 94640 AIRWAY INHALATION TREATMENT: CPT

## 2018-02-12 PROCEDURE — 74011250636 HC RX REV CODE- 250/636: Performed by: NURSE PRACTITIONER

## 2018-02-12 PROCEDURE — 74011250637 HC RX REV CODE- 250/637: Performed by: NURSE PRACTITIONER

## 2018-02-12 PROCEDURE — 77030010545

## 2018-02-12 PROCEDURE — 36415 COLL VENOUS BLD VENIPUNCTURE: CPT | Performed by: NURSE PRACTITIONER

## 2018-02-12 PROCEDURE — 83735 ASSAY OF MAGNESIUM: CPT | Performed by: NURSE PRACTITIONER

## 2018-02-12 PROCEDURE — 85730 THROMBOPLASTIN TIME PARTIAL: CPT | Performed by: NURSE PRACTITIONER

## 2018-02-12 PROCEDURE — 74011250637 HC RX REV CODE- 250/637: Performed by: INTERNAL MEDICINE

## 2018-02-12 PROCEDURE — 74011000250 HC RX REV CODE- 250: Performed by: HOSPITALIST

## 2018-02-12 PROCEDURE — 97165 OT EVAL LOW COMPLEX 30 MIN: CPT

## 2018-02-12 PROCEDURE — 97535 SELF CARE MNGMENT TRAINING: CPT

## 2018-02-12 PROCEDURE — 65660000000 HC RM CCU STEPDOWN

## 2018-02-12 PROCEDURE — 85730 THROMBOPLASTIN TIME PARTIAL: CPT | Performed by: INTERNAL MEDICINE

## 2018-02-12 PROCEDURE — 80048 BASIC METABOLIC PNL TOTAL CA: CPT | Performed by: NURSE PRACTITIONER

## 2018-02-12 PROCEDURE — G8988 SELF CARE GOAL STATUS: HCPCS

## 2018-02-12 PROCEDURE — G8987 SELF CARE CURRENT STATUS: HCPCS

## 2018-02-12 PROCEDURE — 97116 GAIT TRAINING THERAPY: CPT

## 2018-02-12 RX ORDER — MAGNESIUM SULFATE HEPTAHYDRATE 40 MG/ML
2 INJECTION, SOLUTION INTRAVENOUS ONCE
Status: COMPLETED | OUTPATIENT
Start: 2018-02-12 | End: 2018-02-12

## 2018-02-12 RX ORDER — POTASSIUM CHLORIDE 750 MG/1
20 TABLET, FILM COATED, EXTENDED RELEASE ORAL
Status: COMPLETED | OUTPATIENT
Start: 2018-02-12 | End: 2018-02-12

## 2018-02-12 RX ADMIN — ASPIRIN 81 MG 81 MG: 81 TABLET ORAL at 08:35

## 2018-02-12 RX ADMIN — METOPROLOL SUCCINATE 50 MG: 50 TABLET, EXTENDED RELEASE ORAL at 08:36

## 2018-02-12 RX ADMIN — TAMSULOSIN HYDROCHLORIDE 0.4 MG: 0.4 CAPSULE ORAL at 08:35

## 2018-02-12 RX ADMIN — HEPARIN SODIUM AND DEXTROSE 14 UNITS/KG/HR: 10000; 5 INJECTION INTRAVENOUS at 05:22

## 2018-02-12 RX ADMIN — POTASSIUM CHLORIDE 20 MEQ: 750 TABLET, EXTENDED RELEASE ORAL at 10:14

## 2018-02-12 RX ADMIN — IPRATROPIUM BROMIDE AND ALBUTEROL SULFATE 3 ML: .5; 3 SOLUTION RESPIRATORY (INHALATION) at 20:35

## 2018-02-12 RX ADMIN — BUMETANIDE 1 MG: 1 TABLET ORAL at 19:04

## 2018-02-12 RX ADMIN — LOSARTAN POTASSIUM 12.5 MG: 25 TABLET ORAL at 08:35

## 2018-02-12 RX ADMIN — MAGNESIUM SULFATE HEPTAHYDRATE 2 G: 40 INJECTION, SOLUTION INTRAVENOUS at 10:15

## 2018-02-12 RX ADMIN — ATORVASTATIN CALCIUM 40 MG: 40 TABLET, FILM COATED ORAL at 08:35

## 2018-02-12 RX ADMIN — FINASTERIDE 5 MG: 5 TABLET, FILM COATED ORAL at 08:35

## 2018-02-12 RX ADMIN — IPRATROPIUM BROMIDE AND ALBUTEROL SULFATE 3 ML: .5; 3 SOLUTION RESPIRATORY (INHALATION) at 14:55

## 2018-02-12 RX ADMIN — Medication 10 ML: at 06:56

## 2018-02-12 RX ADMIN — BUMETANIDE 1 MG: 1 TABLET ORAL at 08:35

## 2018-02-12 NOTE — PROGRESS NOTES
0000: Bedside shift change report given to Nora Thornton (oncoming nurse) by Alvarado Bae [id number] (offgoing nurse). Report included the following information SBAR, Kardex, Intake/Output, MAR and Recent Results.
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
## 2018-02-12 NOTE — CARDIO/PULMONARY
Cardiac Rehab: Heart Failure education folder, with Low Sodium brochure, given to Harvinder Herbert. Visited to provide HF education based on diagnosis and Cardiac Rehab consult. Educated using teach back method. Discussed diagnosis definition and assessed patient understanding. Reviewed importance of daily weight monitoring and Low Sodium diet (6276-6747 mg. daily). Encouraged activity and rest periods within symptom limitations and as ordered by physician. Reviewed bumex, purpose of medication, potential side effects, compliance, as well as need to limit sodium intake. Harvinder Herbert was encouraged to keep all appointments with doctor. Smoking history assessed. Patient is a former smoker. He is able to tell me his Brielle Sam is at 20%, that he should weigh daily but does not and that he limits salt intake due to high blood pressure. \" Unable to engage in the discussion. ... he closes his eyes frequently indicating he is drowsy. For now, will follow to educate again and complete the Cardiac Rehab consult for HF but debility excludes him for enrollment for outpatient Cardiac Rehab.     Amador Reed RN

## 2018-02-12 NOTE — PROGRESS NOTES
[facility] Progress Note    2/12/2018 9:30 AM  Admit Date: 2/8/2018  Admit Diagnosis: SOB (shortness of breath)    Assessment/Plan     1. Acute on chronic systolic heart failure - LVEF 20% by TTE, NYHA class IV on admission and class III today, ProBNP 17,119 on admission with pulmonary edema on CXR  -continue diuresis with bumex 1mg PO BID  -continue losartan 12.5mg daily and continue Toprol XL 50mg daily (limited due to hypotension)   -will order 6 minute walk prior to discharge to assess functional capacity for CHF  -will consult case management for one time home health visit at discharge for CHF  -will need 5 day follow up appointment at discharge  2. Afib with RVR - currently rate controlled on Toprol XL, [id number]= 6 (age, CHF, HTN, CVA) on IV heparin for anticoagulation while admitted, wife/pt refuse [address] so will continue ASA 81 mg daily at discharge    3. Chest pain with elevated troponin - chest pain resolved, troponin elevation non-specific in the setting of CKD and Atrial Fib with RVR, 12 lead EKG with no ischemic changes noted  -D-dimer 7.8 but VQ scan with low probability for PE   -continue ASA, statin and Toprol XL   4. Hx of HTN - borderline hypotensive, continue losartan 12.5mg daily and  Toprol XL 50mg daily   5. CKD stage 3 - creatinine stable at 1.3, it was 1.6 at [facility] on 2/4/18, continue to monitor     6. Hx of DVT - uncertain date, not on [address], on IV heparin while admitted and ASA 81mg daily, no DVT by venous duplex   7. ? Hx of AAA per [facility] records  8. Elevated liver enzymes - improved, on statin, management per primary team  9. Hx of TBI - no detail known  10. Hx of CVA - noted on head CT, on ASA and statin, on IV heparin while admitted   11. Severe MR, mod TR - by TTE, continue bumex  12. Hypokalemia - K 3.6, will give KCl 20meq PO once now  13. Hypomagnesemia - Mg 1.8, will give Mg Sulfate 2g IV once now  14.   Advanced directives - currently full code, palliative care consultation pending, patient and wife seem to have little insight into pt's chronic health diseases, concern for end stage CHF with limited management options. Echo 2/18 - LVEF mildly dilated, LVEF 20 %, severe diffuse hypokinesis with regional variations, dilated LA, severe MR, mod TR, AoV sclerosis    Subjective: Don Smith denies chest pain or dyspnea at rest.  Denies palpitations. Says he is cold and wants a jacket or sweater. No family at bedside. Briefly discussed CHF and EF 20% and the importance of taking medications and coming to follow up visits as OP. Objective:      Physical Exam:  Visit Vitals    /77 (BP 1 Location: Right arm, BP Patient Position: Post activity)    Pulse 96    Temp 98.4 °F (36.9 °C)    Resp 18    Ht 5' 9\" (1.753 m)    Wt 156 lb (70.8 kg)    SpO2 98%    BMI 23.04 kg/m2     General Appearance:  Well developed, well nourished, alert and oriented x 3, in no acute distress. Ears/Nose/Mouth/Throat:   Hearing grossly normal.         Neck: Supple. Chest:   Coarse breath sounds bilaterally. Cardiovascular:  Irregular rate and rhythm, S1, S2 normal, 2/6 BERNADETTE    Abdomen:   Soft, non-tender, bowel sounds are active. Extremities: Trace LE edema     Skin: Warm and dry.            Telemetry: AFIB with PVCs    Data Review:   Labs:    Recent Results (from the past 24 hour(s))   PTT    Collection Time: 02/11/18  8:42 PM   Result Value Ref Range    aPTT 54.2 (H) 22.1 - 32.5 sec    aPTT, therapeutic range     58.0 - 38.9 SECS   METABOLIC PANEL, BASIC    Collection Time: 02/12/18  3:49 AM   Result Value Ref Range    Sodium 137 136 - 145 mmol/L    Potassium 3.6 3.5 - 5.1 mmol/L    Chloride 100 97 - 108 mmol/L    CO2 30 21 - 32 mmol/L    Anion gap 7 5 - 15 mmol/L    Glucose 88 65 - 100 mg/dL    BUN 33 (H) 6 - 20 MG/DL    Creatinine 1.39 (H) 0.70 - 1.30 MG/DL    BUN/Creatinine ratio 24 (H) 12 - 20      GFR est AA 59 (L) >60 ml/min/1.73m2    GFR est non-AA 48 (L) >60 ml/min/1.73m2    Calcium 7.5 (L) 8.5 - 10.1 MG/DL   MAGNESIUM    Collection Time: 02/12/18  3:49 AM   Result Value Ref Range    Magnesium 1.8 1.6 - 2.4 mg/dL   PTT    Collection Time: 02/12/18  3:49 AM   Result Value Ref Range    aPTT 59.1 (H) 22.1 - 32.5 sec    aPTT, therapeutic range     58.0 - 77.0 SECS   PTT    Collection Time: 02/12/18 10:46 AM   Result Value Ref Range    aPTT 71.0 (H) 22.1 - 32.5 sec    aPTT, therapeutic range     58.0 - 77.0 SECS           Current Facility-Administered Medications   Medication Dose Route Frequency    albuterol-ipratropium (DUO-NEB) 2.5 MG-0.5 MG/3 ML  3 mL Nebulization TID RT    metoprolol succinate (TOPROL-XL) XL tablet 50 mg  50 mg Oral DAILY    bumetanide (BUMEX) tablet 1 mg  1 mg Oral BID    losartan (COZAAR) tablet 12.5 mg  12.5 mg Oral DAILY    heparin 25,000 units in D5W 250 ml infusion  12-25 Units/kg/hr IntraVENous TITRATE    sodium chloride (NS) flush 5-10 mL  5-10 mL IntraVENous Q8H    sodium chloride (NS) flush 5-10 mL  5-10 mL IntraVENous PRN    acetaminophen (TYLENOL) tablet 650 mg  650 mg Oral Q4H PRN    aspirin chewable tablet 81 mg  81 mg Oral DAILY    atorvastatin (LIPITOR) tablet 40 mg  40 mg Oral DAILY    finasteride (PROSCAR) tablet 5 mg  5 mg Oral DAILY    tamsulosin (FLOMAX) capsule 0.4 mg  0.4 mg Oral DAILY    nitroglycerin (NITROSTAT) tablet 0.4 mg  0.4 mg SubLINGual Q5MIN PRN    influenza vaccine 2017-18 (3 yrs+)(PF) (FLUZONE QUAD/FLUARIX QUAD) injection 0.5 mL  0.5 mL IntraMUSCular PRIOR TO DISCHARGE       Marcela Fraire MD  [facility] [address] [facility] [address]  [facility]  [phone]

## 2018-02-12 NOTE — PROGRESS NOTES
Patient admitted for SOB, CHF - lives with wife - spoke with wife and he [facility] [place] [facility] [place] [facility] [place] has had a recent stay at [facility] - wife wants patient at home and she goes to dialysis every MWF - CM explained patient needs 24/7 assist - wife states that [address] [place] [address] [facility] to come be with patient when she is at dialysis - wife does not want patient to go to a rehab facilty and patient is open to a skilled home care agency for SN and PT and she does not recall name of the agency as he had been set-up with this agency by [address] placed a call to facility to try to obtain name of the agency at this time - CM noted palliative care consult and meeting pending at this time and she has declined for [facility] personal care aide services for patient at this time- wife does not want patient to go to a rehab facility at this time- CM will continue to follow. PAUL Grande    Care Management Interventions  PCP Verified by CM:  Yes ([facility] Fozia Banks [facility]  [address] Criteria Met (RRAT>21 & CHF Dx)?: No (however still has a palliatve care consult even though RRAT is 14)  Palliative Consult Recommended?: Yes  Transition of Care Consult (CM Consult): [address]: No  Reason Outside [place]: Patient already serviced by other home care/hospice agency  MyChart Signup: No  Discharge Durable Medical Equipment: No  Physical Therapy Consult: Yes  Occupational Therapy Consult: No  Speech Therapy Consult: No  Current Support Network: Lives with Spouse, Own Home (also had recent stay at [facility])  Confirm Follow Up Transport: Family  Plan discussed with Pt/Family/Caregiver: Yes  Freedom of Choice Offered: Yes  Discharge Location  Discharge Placement: Home with home health

## 2018-02-12 NOTE — PROGRESS NOTES
Problem: Self Care Deficits Care Plan (Adult)  Goal: *Acute Goals and Plan of Care (Insert Text)  Occupational Therapy Goals  Initiated 2/12/2018  1. Patient will perform grooming standing at sink with supervision/ set-up within 7 day(s). 2.  Patient will perform lower body dressing with minimum assistance within 7 day(s). 3.  Patient will perform toilet transfers with supervision/set-up within 7 day(s). 4.  Patient will perform bathing with supervision/ set-up within 7 day(s). 5.  Patient will perform all aspects of toileting with moderate assistance within 7 day(s). Occupational Therapy EVALUATION  Patient: Elvis Rivas (36 y.o. male)  Date: 2/12/2018  Primary Diagnosis: SOB (shortness of breath)        Precautions:  Fall    ASSESSMENT :  Based on the objective data described below, the patient presents with confusion, disorientation (Ox1), decreased command following, impaired attention, incontinence of bowel and bladder, impaired standing balance, impaired functional mobility, and impaired ADL independence s/p admission for SOB and CHF. Patient is poor historian, unable to determine PLOF. Patient currently requires min-A for bed mobility and min-A for sit-stand/ bed-chair transfers. Patient presents incontinent of bowel and bladder, required total-A x2 for toileting rolling side to side in bed. Patient urinated in urinal standing with min-A for steadying, and was incontinent again of bowel standing, requiring total-A x2 (x1 to for steadying standing and x1 for wiping/ changing brief). Patient dons/ doffs socks with set-up sitting EOB. Family not present during evaluation, unable to determine amount of assistance available at home. Per chart review, patient/ family have declined rehab as an option. Recommend rehab vs home deepak (with 24/7 assistance). Patient will benefit from skilled intervention to address the above impairments.   Patients rehabilitation potential is considered to be Good  Factors which may influence rehabilitation potential include:   [x]             None noted  []             Mental ability/status  []             Medical condition  []             Home/family situation and support systems  []             Safety awareness  []             Pain tolerance/management  []             Other:      PLAN :  Recommendations and Planned Interventions:  [x]               Self Care Training                  [x]        Therapeutic Activities  [x]               Functional Mobility Training    []        Cognitive Retraining  [x]               Therapeutic Exercises           [x]        Endurance Activities  [x]               Balance Training                   []        Neuromuscular Re-Education  []               Visual/Perceptual Training     [x]   Home Safety Training  [x]               Patient Education                 [x]        Family Training/Education  []               Other (comment):    Frequency/Duration: Patient will be followed by occupational therapy 5 times a week to address goals. Discharge Recommendations: rehab vs home with 24/7 assistance  Further Equipment Recommendations for Discharge: TBD     SUBJECTIVE:   Patient stated I feel better sitting up.  (after feeling a \"little dizzy\" supine)    OBJECTIVE DATA SUMMARY:   HISTORY:   Past Medical History:   Diagnosis Date    Arthritis     Heart failure ([address]     Hypertension      Past Surgical History:   Procedure Laterality Date    HX PROSTATECTOMY         Prior Level of Function/Environment/Context: patient poor historian, unable to determine PLOF    Expanded or extensive additional review of patient history:     Home Situation  Home Environment: Private residence  Wheelchair Ramp: Yes  One/Two Story Residence: One story  Living Alone: No  Support Systems: Spouse/Significant Other/Partner  Patient Expects to be Discharged to[de-identified] Private residence  Current DME Used/Available at Home: [employer], rolling  []  Right hand dominant []  Left hand dominant    EXAMINATION OF PERFORMANCE DEFICITS:  Cognitive/Behavioral Status:  Neurologic State: Alert;Confused  Orientation Level: Oriented to person;Disoriented to place; Disoriented to situation;Disoriented to time  Cognition: Poor safety awareness;Decreased command following;Decreased attention/concentration  Perception: Appears intact  Perseveration: No perseveration noted  Safety/Judgement: Decreased insight into deficits; Decreased awareness of need for safety;Decreased awareness of need for assistance;Decreased awareness of environment    Skin: visible skin appears intact    Edema: none noted    Hearing: Auditory  Auditory Impairment: Hard of hearing, bilateral    Vision/Perceptual:                           Acuity: Able to read employee name badge without difficulty; Difficulty reading clock (reversed minute/ hour hands)  Corrective Lenses: Glasses (not present)    Range of Motion:    AROM: Within functional limits  PROM: Within functional limits                      Strength:    Strength: Within functional limits                Coordination:  Coordination: Within functional limits  Fine Motor Skills-Upper: Left Intact; Right Intact    Gross Motor Skills-Upper: Left Intact; Right Intact    Tone & Sensation:    Tone: Normal  Sensation: Intact                      Balance:  Sitting: Intact  Standing: Impaired  Standing - Static: Fair  Standing - Dynamic : Fair    Functional Mobility and Transfers for ADLs:  Bed Mobility:  Rolling: Minimum assistance  Supine to Sit: Minimum assistance (for raising trunk)    Transfers:  Sit to Stand: Minimum assistance (using RW)  Stand to Sit: Minimum assistance (using RW)  Toilet Transfer : Minimum assistance (inferred)    ADL Assessment:  Feeding: Independent (inferred)    Oral Facial Hygiene/Grooming: Setup (inferred)    Bathing:  Moderate assistance (inferred)    Upper Body Dressing: Minimum assistance    Lower Body Dressing: Minimum assistance (dons/ doffs socks with set-up. min-A for standing)    Toileting: Total assistance;Assist x2 (rolling in bed and standing with RW)                ADL Intervention and task modifications:                 Cognitive Retraining  Safety/Judgement: Decreased insight into deficits; Decreased awareness of need for safety;Decreased awareness of need for assistance;Decreased awareness of environment      Functional Measure:  Barthel Index:    Bathin  Bladder: 0  Bowels: 0  Groomin  Dressin  Feeding: 10  Mobility: 0  Stairs: 0  Toilet Use: 5  Transfer (Bed to Chair and Back): 10  Total: 35       Barthel and G-code impairment scale:  Percentage of impairment CH  0% CI  1-19% CJ  20-39% CK  40-59% CL  60-79% CM  80-99% CN  100%   Barthel Score 0-100 100 99-80 79-60 59-40 20-39 1-19   0   Barthel Score 0-20 20 17-19 13-16 9-12 5-8 1-4 0      The Barthel ADL Index: Guidelines  1. The index should be used as a record of what a patient does, not as a record of what a patient could do. 2. The main aim is to establish degree of independence from any help, physical or verbal, however minor and for whatever reason. 3. The need for supervision renders the patient not independent. 4. A patient's performance should be established using the best available evidence. Asking the patient, friends/relatives and nurses are the usual sources, but direct observation and common sense are also important. However direct testing is not needed. 5. Usually the patient's performance over the preceding 24-48 hours is important, but occasionally longer periods will be relevant. 6. Middle categories imply that the patient supplies over 50 per cent of the effort. 7. Use of aids to be independent is allowed. Connie Melgar., Barthel, D.W. (1266). Functional evaluation: the Barthel Index. [address] [facility] (14)2. NELSY Guerrero, Chey Stover., Ezra Og., Corinne, [address] ().  Measuring the change indisability after inpatient rehabilitation; comparison of the responsiveness of the Barthel Index and Functional [place] Measure. Journal of Neurology, Neurosurgery, and Psychiatry, 66(4), 644-333. SUSAN Ferrari.CARLOS, HERIBERTO Turcios, & Didier Grewal M.A. (2004.) Assessment of post-stroke quality of life in cost-effectiveness studies: The usefulness of the Barthel Index and the EuroQoL-5D. Quality of Life Research, 13, 215-34         G codes: In compliance with CMSs Claims Based Outcome Reporting, the following G-code set was chosen for this patient based on their primary functional limitation being treated: The outcome measure chosen to determine the severity of the functional limitation was the Barthel index with a score of 35/100 which was correlated with the impairment scale. ?  Self Care:     - CURRENT STATUS: CL - 60%-79% impaired, limited or restricted    - GOAL STATUS: CJ - 20%-39% impaired, limited or restricted    - D/C STATUS:  ---------------To be determined---------------     Occupational Therapy Evaluation Charge Determination   History Examination Decision-Making   LOW Complexity : Brief history review  LOW Complexity : 1-3 performance deficits relating to physical, cognitive , or psychosocial skils that result in activity limitations and / or participation restrictions  LOW Complexity : No comorbidities that affect functional and no verbal or physical assistance needed to complete eval tasks       Based on the above components, the patient evaluation is determined to be of the following complexity level: LOW   Pain:  Pain Scale 1: Numeric (0 - 10)  Pain Intensity 1: 0              Activity Tolerance:   Vitals:    02/12/18 1114 02/12/18 1134   BP: 100/65 120/77   BP 1 Location: Right arm Right arm   BP Patient Position: At rest, Supine Post activity, Sitting   Pulse: 92 96   Resp: 20 18   Temp: 98.4 °F (36.9 °C)    SpO2: 98% 98%     Please refer to the flowsheet for vital signs taken during this treatment. After treatment:   [x] Patient left in no apparent distress sitting up in chair  [] Patient left in no apparent distress in bed  [x] Call bell left within reach  [x] Nursing notified  [] Caregiver present  [x] Chair alarm activated    COMMUNICATION/EDUCATION:   The patients plan of care was discussed with: Registered Nurse. [x] Home safety education was provided and the patient/caregiver indicated understanding. [x] Patient/family have participated as able in goal setting and plan of care. [x] Patient/family agree to work toward stated goals and plan of care. [] Patient understands intent and goals of therapy, but is neutral about his/her participation. [] Patient is unable to participate in goal setting and plan of care. This patients plan of care is appropriate for delegation to [facility].     Thank you for this referral.  Zeke Owusu OT  Time Calculation: 40 mins

## 2018-02-12 NOTE — PROGRESS NOTES
Discussed patient's \"high fall risk\" status and order for six minute walk with Doretha Dalton RN.   RN agreed to coordinate order with PT.

## 2018-02-12 NOTE — PROGRESS NOTES
Problem: Discharge Planning  Goal: *Discharge to safe environment  Outcome: Progressing Towards Goal  See cm notes.  PAUL Cedillo

## 2018-02-12 NOTE — PROGRESS NOTES
Hospitalist Progress Note  Samantha Diop MD  Answering service: [phone] OR 36 from in house phone  Cell: [phone]      Date of Service:  2018  NAME:  Radha Mota  :  1931  MRN:  [id number]      Admission Summary:   The patient is an 60-year-old male with past medical history of CHF, hypertension, DVT, AAA, atherosclerosis, right inguinal hernia, cholelithiasis, BPH, who presents to the hospital complaining of SOB    Interval history / Subjective:     F/u SOB  Feels better now  No SOB, chest pain, cough     Assessment & Plan:     Acute on chronic systolic CHF, NYHA class IV on admission, NYHA III today  -Echo with EF 20% with severe MR and moderate TR  -On IV diuresis switched to oral 2/10  -Strict I/Os, continue clinton  -Daily weight  -Appreciate Cardiology input  -On ASA/Lipitor/Losartan/Metoprolol XL   -Spoke to Dr Evangelina Cuellar, the patient can be discharged     A fib with RVR  -Was on cardizem drip, off on 2/10  -Continue Metoprolol  -On IV heparin, family still refusing oral anticoagulation  -ASA started     Chest pain with a history of DVT  -V/Q scan low probability for PE  -D dimer 7.8  -Troponin elevated  -Cardiology on board    CKD stage III  -stable  -monitor    History of AAA  -5.2 cm in diameter per US  -Outpatient follow up    Hypokalemia  -replace as needed    Transaminitis  -US abd  A mildly echogenic and heterogeneous liver can be seen with steatosis or  other nonspecific parenchymal liver disease. There is no focal liver mass. Gallstones without biliary duct dilatation  -On Statin, will continue as repeat CMP improved  -Monitor    History of CVA  -on ASA/Statin    History of concussion  -The wife claims that the patient gets \"more confused at a strange place\". She says that the patient does not have Dementia    BPH  -on Flomax/Proscar    Cardiac diet    PT/OT rehab vs home health     Code status: FULL CODE.  I think it is very important for the palliative to see the patient and the wife before discharge  DVT prophylaxis: Heparin  PTA: home with wife (usually goes [facility] but was on diversion)    Plan: Discharge tomorrow after seen by palliative care    Care Plan discussed with: Patient/Family Spoke to the wife on phone and she does not want the patient to go to any rehab. Disposition: TBD     Hospital Problems  Date Reviewed: 2/8/2018          Codes Class Noted POA    * (Principal)SOB (shortness of breath) ICD-10-CM: R06.02  ICD-9-CM: 786.05  2/8/2018 Unknown                Review of Systems:   A comprehensive review of systems was negative except for that written in the HPI. Vital Signs:    Last 24hrs VS reviewed since prior progress note. Most recent are:  Visit Vitals    /52 (BP 1 Location: Right arm, BP Patient Position: Sitting)    Pulse 89    Temp 98.3 °F (36.8 °C)    Resp 18    Ht 5' 9\" (1.753 m)    Wt 70.8 kg (156 lb)    SpO2 97%    BMI 23.04 kg/m2         Intake/Output Summary (Last 24 hours) at 02/12/18 1603  Last data filed at 02/12/18 1544   Gross per 24 hour   Intake           1123.4 ml   Output                0 ml   Net           1123.4 ml        Physical Examination:             Constitutional:  No acute distress, cooperative, pleasant    ENT:  Oral mucous moist, oropharynx benign. Neck supple,    Resp:  Bilateral expiratory wheezing. No accessory muscle use   CV:  Regular rhythm, normal rate, no murmurs, gallops, rubs    GI:  Soft, non distended, non tender. normoactive bowel sounds, no hepatosplenomegaly     Musculoskeletal:  No edema, warm, 2+ pulses throughout    Neurologic:  Moves all extremities.   AAOx3, CN II-XII reviewed     Skin:  Good turgor, no rashes or ulcers       Data Review:    Review and/or order of clinical lab test      Labs:     Recent Labs      02/11/18   0325  02/10/18   0118   WBC  4.8  5.8   HGB  10.8*  10.1*   HCT  32.1*  30.6*   PLT  120*  121*     Recent Labs      02/12/18   0349  02/11/18 0325  02/10/18   0118   NA  137  139  142   K  3.6  3.5  3.0*   CL  100  101  102   CO2  30  28  33*   BUN  33*  38*  36*   CREA  1.39*  1.48*  1.57*   GLU  88  97  102*   CA  7.5*  7.1*  7.1*   MG  1.8  1.8  1.9     Recent Labs      02/11/18   0325  02/10/18   0118   SGOT  61*  59*   ALT  63  72   AP  101  91   TBILI  1.0  1.1*   TP  6.3*  6.1*   ALB  2.4*  2.3*   GLOB  3.9  3.8     Recent Labs      02/12/18   1046  02/12/18 0349 02/11/18   2042   APTT  71.0*  59.1*  54.2*      No results for input(s): FE, TIBC, PSAT, FERR in the last 72 hours. No results found for: FOL, RBCF   No results for input(s): PH, PCO2, PO2 in the last 72 hours. No results for input(s): CPK, CKNDX, TROIQ in the last 72 hours.     No lab exists for component: CPKMB  Lab Results   Component Value Date/Time    Cholesterol, total 116 02/08/2018 06:05 PM    HDL Cholesterol 35 02/08/2018 06:05 PM    LDL, calculated 63.6 02/08/2018 06:05 PM    Triglyceride 87 02/08/2018 06:05 PM    CHOL/HDL Ratio 3.3 02/08/2018 06:05 PM     No results found for: GLUCPOC  No results found for: COLOR, APPRN, SPGRU, REFSG, SERGIO, PROTU, GLUCU, KETU, BILU, UROU, MARCELA, LEUKU, GLUKE, EPSU, BACTU, WBCU, RBCU, CASTS, UCRY      Medications Reviewed:     Current Facility-Administered Medications   Medication Dose Route Frequency    albuterol-ipratropium (DUO-NEB) 2.5 MG-0.5 MG/3 ML  3 mL Nebulization TID RT    metoprolol succinate (TOPROL-XL) XL tablet 50 mg  50 mg Oral DAILY    bumetanide (BUMEX) tablet 1 mg  1 mg Oral BID    losartan (COZAAR) tablet 12.5 mg  12.5 mg Oral DAILY    heparin 25,000 units in D5W 250 ml infusion  12-25 Units/kg/hr IntraVENous TITRATE    sodium chloride (NS) flush 5-10 mL  5-10 mL IntraVENous Q8H    sodium chloride (NS) flush 5-10 mL  5-10 mL IntraVENous PRN    acetaminophen (TYLENOL) tablet 650 mg  650 mg Oral Q4H PRN    aspirin chewable tablet 81 mg  81 mg Oral DAILY    atorvastatin (LIPITOR) tablet 40 mg  40 mg Oral DAILY    finasteride (PROSCAR) tablet 5 mg  5 mg Oral DAILY    tamsulosin (FLOMAX) capsule 0.4 mg  0.4 mg Oral DAILY    nitroglycerin (NITROSTAT) tablet 0.4 mg  0.4 mg SubLINGual Q5MIN PRN    influenza vaccine 2017-18 (3 yrs+)(PF) (FLUZONE QUAD/FLUARIX QUAD) injection 0.5 mL  0.5 mL IntraMUSCular PRIOR TO DISCHARGE     ______________________________________________________________________  EXPECTED LENGTH OF STAY: 2d 12h  ACTUAL LENGTH OF STAY:          Ernie Childs MD

## 2018-02-12 NOTE — PROGRESS NOTES
Problem: Falls - Risk of  Goal: *Absence of Falls  Document [place] Fall Risk and appropriate interventions in the flowsheet. Outcome: Progressing Towards Goal  Fall Risk Interventions:  Mobility Interventions: Bed/chair exit alarm    Mentation Interventions: Adequate sleep, hydration, pain control    Medication Interventions: Evaluate medications/consider consulting pharmacy    Elimination Interventions: Call light in reach             Problem: Pressure Injury - Risk of  Goal: *Prevention of pressure ulcer  Outcome: Progressing Towards Goal  No pressure ulcer noted. Problem: Heart Failure: Day 1  Goal: *Hemodynamically stable  Outcome: Progressing Towards Goal  VSS   Visit Vitals    /67 (BP 1 Location: Right arm, BP Patient Position: At rest)    Pulse 96    Temp 97.6 °F (36.4 °C)    Resp 18    Ht 5' 9\" (1.753 m)    Wt 70.8 kg (156 lb)    SpO2 97%    BMI 23.04 kg/m2     0800: Bedside shift change report given to Doretha Dalton (oncoming nurse) by Prema Alvarez (offgoing nurse). Report included the following information SBAR, Kardex, Intake/Output, MAR and Recent Results.

## 2018-02-12 NOTE — PROGRESS NOTES
Problem: Mobility Impaired (Adult and Pediatric)  Goal: *Acute Goals and Plan of Care (Insert Text)  Physical Therapy Goals  Initiated 2/10/2018  1. Patient will move from supine to sit and sit to supine  in bed with supervision/set-up within 7 day(s). 2.  Patient will transfer from bed to chair and chair to bed with supervision/set-up using the least restrictive device within 7 day(s). 3.  Patient will perform sit to stand with supervision/set-up within 7 day(s). 4.  Patient will ambulate with supervision/set-up for 100 feet with the least restrictive device within 7 day(s). 5.  Patient will ascend/descend 3 stairs with 1 handrail(s) with modified independence within 7 day(s). physical Therapy TREATMENT  Patient: Danelle Cole (35 y.o. male)  Date: 2/12/2018  Diagnosis: SOB (shortness of breath) SOB (shortness of breath)       Precautions: Fall  Chart, physical therapy assessment, plan of care and goals were reviewed. ASSESSMENT:  Chart reviewed, RN cleared patient for mobility, and patient received in bed. Patient continues to be disoriented to place and situation, able to be reoriented temporarily. All mobility requires additional time and Min A x 1 with delay processing/poor command following noted. Initial O2 at rest 98%, HR 81. Due to patient's mobility a 6 minute walk test would be inappropriate, but vitals were assessed with activity. After ambulating 50 ft O2 sats were 98% and HR increased to 91 but quickly returned to baseline. Patient ended session in chair with all needs placed within reach and RN was present and notified of patient's status. Discharge rec - rehab vs home with 24/7 physical assistance.       Progression toward goals:  []    Improving appropriately and progressing toward goals  [x]    Improving slowly and progressing toward goals  []    Not making progress toward goals and plan of care will be adjusted     PLAN:  Patient continues to benefit from skilled intervention to address the above impairments. Continue treatment per established plan of care. Discharge Recommendations:  Rehab vs Home Health with 24/7 physical assistance  Further Equipment Recommendations for Discharge:  TBD     SUBJECTIVE:   Patient stated [address] [place] you get me here from home?     OBJECTIVE DATA SUMMARY:   Critical Behavior:  Neurologic State: Alert, Confused  Orientation Level: Oriented to person, Disoriented to place, Disoriented to situation, Disoriented to time  Cognition: Poor safety awareness, Decreased command following, Decreased attention/concentration  Safety/Judgement: Decreased insight into deficits, Decreased awareness of need for safety, Decreased awareness of need for assistance, Decreased awareness of environment  Functional Mobility Training:  Bed Mobility:  Rolling: Minimum assistance  Supine to Sit: Minimum assistance (for raising trunk)              Transfers:  Sit to Stand: Minimum assistance (using RW)  Stand to Sit: Minimum assistance (using RW)                             Balance:  Sitting: Intact  Standing: Impaired  Standing - Static: Fair  Standing - Dynamic : Fair  Ambulation/Gait Training:  Distance (ft): 50 Feet (ft)  Assistive Device: Gait belt;Walker, rolling  Ambulation - Level of Assistance: Contact guard assistance (constant VC/TC for RW management)        Gait Abnormalities: Decreased step clearance;Trunk sway increased        Base of Support: Widened     Speed/Phyllis: Shuffled; Slow  Step Length: Right shortened;Left shortened                    Stairs:            Neuro Re-Education:    Therapeutic Exercises:     Pain:  Pain Scale 1: Numeric (0 - 10)  Pain Intensity 1: 0              Activity Tolerance:   Fair, requested to turn and sit after 50 ft of slow gait training, disoriented and requiring constant verbal cues/tactile cues for RW management  Please refer to the flowsheet for vital signs taken during this treatment.   After treatment:   [x]    Patient left in no apparent distress sitting up in chair  []    Patient left in no apparent distress in bed  [x]    Call bell left within reach  [x]    Nursing notified  []    Caregiver present  [x]    Bed alarm activated    COMMUNICATION/COLLABORATION:   The patients plan of care was discussed with: Registered Nurse    Anai Jensen PT, DPT   Time Calculation: 16 mins

## 2018-02-12 NOTE — PROGRESS NOTES
1930: Bedside shift change report given to [address] [place] [address] (oncoming nurse) by [employer] (offgoing nurse). Report included the following information SBAR, Kardex, ED Summary, Intake/Output, MAR, Accordion, Recent Results and Med Rec Status. 2330: Bedside shift change report given to [address] [facility] (oncoming nurse) by [address] (offgoing nurse). Report included the following information SBAR, Kardex, ED Summary, Intake/Output, MAR, Accordion, Recent Results and Med Rec Status. Problem: Falls - Risk of  Goal: *Absence of Falls  Document [place] Fall Risk and appropriate interventions in the flowsheet.    Outcome: Progressing Towards Goal  Fall Risk Interventions:  Mobility Interventions: Bed/chair exit alarm, Communicate number of staff needed for ambulation/transfer, OT consult for ADLs, Patient to call before getting OOB, PT Consult for mobility concerns, Utilize walker, cane, or other assitive device    Mentation Interventions: Adequate sleep, hydration, pain control, Bed/chair exit alarm, Door open when patient unattended, Evaluate medications/consider consulting pharmacy, Eyeglasses and hearing aids, Increase mobility, More frequent rounding, Reorient patient, Update white board    Medication Interventions: Evaluate medications/consider consulting pharmacy, Patient to call before getting OOB, Teach patient to arise slowly    Elimination Interventions: Call light in reach, Patient to call for help with toileting needs             Problem: Pressure Injury - Risk of  Goal: *Prevention of pressure ulcer  Outcome: Progressing Towards Goal  Turning pt, incontinent briefs frequently checked & changed, skin assessment

## 2018-02-13 VITALS
RESPIRATION RATE: 20 BRPM | WEIGHT: 155 LBS | TEMPERATURE: 98.3 F | DIASTOLIC BLOOD PRESSURE: 65 MMHG | OXYGEN SATURATION: 99 % | SYSTOLIC BLOOD PRESSURE: 109 MMHG | BODY MASS INDEX: 22.96 KG/M2 | HEART RATE: 79 BPM | HEIGHT: 69 IN

## 2018-02-13 LAB
ANION GAP SERPL CALC-SCNC: 9 MMOL/L (ref 5–15)
BUN SERPL-MCNC: 30 MG/DL (ref 6–20)
BUN/CREAT SERPL: 24 (ref 12–20)
CALCIUM SERPL-MCNC: 7.5 MG/DL (ref 8.5–10.1)
CHLORIDE SERPL-SCNC: 101 MMOL/L (ref 97–108)
CO2 SERPL-SCNC: 24 MMOL/L (ref 21–32)
CREAT SERPL-MCNC: 1.24 MG/DL (ref 0.7–1.3)
GLUCOSE SERPL-MCNC: 92 MG/DL (ref 65–100)
MAGNESIUM SERPL-MCNC: 1.8 MG/DL (ref 1.6–2.4)
POTASSIUM SERPL-SCNC: 4 MMOL/L (ref 3.5–5.1)
SODIUM SERPL-SCNC: 134 MMOL/L (ref 136–145)

## 2018-02-13 PROCEDURE — 36415 COLL VENOUS BLD VENIPUNCTURE: CPT | Performed by: NURSE PRACTITIONER

## 2018-02-13 PROCEDURE — 80048 BASIC METABOLIC PNL TOTAL CA: CPT | Performed by: NURSE PRACTITIONER

## 2018-02-13 PROCEDURE — 90471 IMMUNIZATION ADMIN: CPT

## 2018-02-13 PROCEDURE — 74011250637 HC RX REV CODE- 250/637: Performed by: FAMILY MEDICINE

## 2018-02-13 PROCEDURE — 74011250636 HC RX REV CODE- 250/636: Performed by: FAMILY MEDICINE

## 2018-02-13 PROCEDURE — 74011250636 HC RX REV CODE- 250/636: Performed by: NURSE PRACTITIONER

## 2018-02-13 PROCEDURE — 74011250637 HC RX REV CODE- 250/637: Performed by: NURSE PRACTITIONER

## 2018-02-13 PROCEDURE — 74011250637 HC RX REV CODE- 250/637: Performed by: INTERNAL MEDICINE

## 2018-02-13 PROCEDURE — 90686 IIV4 VACC NO PRSV 0.5 ML IM: CPT | Performed by: FAMILY MEDICINE

## 2018-02-13 PROCEDURE — 94640 AIRWAY INHALATION TREATMENT: CPT

## 2018-02-13 PROCEDURE — 74011000250 HC RX REV CODE- 250: Performed by: HOSPITALIST

## 2018-02-13 PROCEDURE — 83735 ASSAY OF MAGNESIUM: CPT | Performed by: NURSE PRACTITIONER

## 2018-02-13 RX ORDER — LOSARTAN POTASSIUM 25 MG/1
12.5 TABLET ORAL DAILY
Qty: 30 TAB | Refills: 1 | Status: SHIPPED | OUTPATIENT
Start: 2018-02-14

## 2018-02-13 RX ORDER — ENOXAPARIN SODIUM 100 MG/ML
70 INJECTION SUBCUTANEOUS EVERY 12 HOURS
Status: DISCONTINUED | OUTPATIENT
Start: 2018-02-13 | End: 2018-02-13 | Stop reason: HOSPADM

## 2018-02-13 RX ORDER — METOPROLOL SUCCINATE 50 MG/1
50 TABLET, EXTENDED RELEASE ORAL DAILY
Qty: 30 TAB | Refills: 1 | Status: SHIPPED | OUTPATIENT
Start: 2018-02-13

## 2018-02-13 RX ORDER — MAGNESIUM SULFATE HEPTAHYDRATE 40 MG/ML
2 INJECTION, SOLUTION INTRAVENOUS ONCE
Status: COMPLETED | OUTPATIENT
Start: 2018-02-13 | End: 2018-02-13

## 2018-02-13 RX ORDER — BUMETANIDE 1 MG/1
1 TABLET ORAL 2 TIMES DAILY
Qty: 60 TAB | Refills: 1 | Status: SHIPPED | OUTPATIENT
Start: 2018-02-13

## 2018-02-13 RX ADMIN — BUMETANIDE 1 MG: 1 TABLET ORAL at 08:37

## 2018-02-13 RX ADMIN — ASPIRIN 81 MG 81 MG: 81 TABLET ORAL at 08:37

## 2018-02-13 RX ADMIN — LOSARTAN POTASSIUM 12.5 MG: 25 TABLET ORAL at 08:37

## 2018-02-13 RX ADMIN — ENOXAPARIN SODIUM 70 MG: 80 INJECTION SUBCUTANEOUS at 09:39

## 2018-02-13 RX ADMIN — MAGNESIUM SULFATE HEPTAHYDRATE 2 G: 40 INJECTION, SOLUTION INTRAVENOUS at 08:31

## 2018-02-13 RX ADMIN — METOPROLOL SUCCINATE 50 MG: 50 TABLET, EXTENDED RELEASE ORAL at 08:37

## 2018-02-13 RX ADMIN — ATORVASTATIN CALCIUM 40 MG: 40 TABLET, FILM COATED ORAL at 08:37

## 2018-02-13 RX ADMIN — INFLUENZA VIRUS VACCINE 0.5 ML: 15; 15; 15; 15 SUSPENSION INTRAMUSCULAR at 13:15

## 2018-02-13 RX ADMIN — TAMSULOSIN HYDROCHLORIDE 0.4 MG: 0.4 CAPSULE ORAL at 08:37

## 2018-02-13 RX ADMIN — IPRATROPIUM BROMIDE AND ALBUTEROL SULFATE 3 ML: .5; 3 SOLUTION RESPIRATORY (INHALATION) at 10:25

## 2018-02-13 RX ADMIN — FINASTERIDE 5 MG: 5 TABLET, FILM COATED ORAL at 08:37

## 2018-02-13 NOTE — PROGRESS NOTES
[id number]:  Per Marbella Lai, cardiology NP, patient has a one-time follow up appointment this Friday, but the family will need to schedule future cardiology appointments with the [place] where patient is typically seen. 0904hrs:  RN spoke with patient's wife. Updates provided--she is aware that patient is to be discharged today. Wife advised that she will need to follow up with patient's doctors at the [place] for future cardiology appointments but that his appointments through [facility] will be on his discharge paperwork. Wife does not drive so patient's nephew will provide transportation home. Call placed to notify him of his uncle's discharge. He will be here around 1300hrs to pick him up.
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
## 2018-02-13 NOTE — ACP (ADVANCE CARE PLANNING)
ADV Care Plan Note    Materials about AMD, MPOA, Code status shared with patient's nephew, Hemanth Collins, who is giving him a ride home today. I asked him to give this to patient's wife and please have her bring to PCP or heart doctor to discuss questions and complete as desired.

## 2018-02-13 NOTE — PROGRESS NOTES
1930: Bedside shift change report given to [address] [place] [address] (oncoming nurse) by Angie Howard RNs (offgoing nurse). Report included the following information SBAR, Kardex, ED Summary, Intake/Output, MAR, Accordion, Recent Results and Med Rec Status. Uneventful shift. 0730: Bedside shift change report given to Angie Howard RNs (oncoming nurse) by [address] [place] [address] (offgoing nurse). Report included the following information SBAR, Kardex, ED Summary, Intake/Output, MAR, Accordion, Recent Results and Med Rec Status.

## 2018-02-13 NOTE — DISCHARGE SUMMARY
Discharge Summary     Patient:  Pete Warren       MRN: [id number]       YOB: 1931       Age: 80 y.o. Date of admission:  2/8/2018    Date of discharge:  2/13/2018    Primary care provider: Dr. Adarsh Schulte MD    Admitting provider:  Dolly Salcedo MD    Discharging provider:  Anthony Rogel MD - [phone]  If unavailable, call [phone] and ask the  to page the triage hospitalist.    Consultations  · IP CONSULT TO CARDIOLOGY  · IP CONSULT TO HOSPITALIST  · IP CONSULT TO PALLIATIVE CARE - PROVIDER    Procedures  · * No surgery found *    Discharge destination: HOME with [facility]. The patient is stable for discharge. Admission diagnosis  · SOB (shortness of breath)    Current Discharge Medication List      START taking these medications    Details   bumetanide (BUMEX) 1 mg tablet Take 1 Tab by mouth two (2) times a day. Qty: 60 Tab, Refills: 1      losartan (COZAAR) 25 mg tablet Take 0.5 Tabs by mouth daily. Qty: 30 Tab, Refills: 1         CONTINUE these medications which have CHANGED    Details   metoprolol succinate (TOPROL-XL) 50 mg XL tablet Take 1 Tab by mouth daily. Qty: 30 Tab, Refills: 1         CONTINUE these medications which have NOT CHANGED    Details   aspirin 81 mg chewable tablet Take 81 mg by mouth daily. atorvastatin (LIPITOR) 80 mg tablet Take 40 mg by mouth daily. cyanocobalamin 1,000 mcg tablet Take 1,000 mcg by mouth daily. finasteride (PROSCAR) 5 mg tablet Take 5 mg by mouth daily. MULTIVIT WITH IRON,MINERALS (MULTIVITAMIN AND MINERALS PO) Take 1 Tab by mouth daily. tamsulosin (FLOMAX) 0.4 mg capsule Take 0.4 mg by mouth daily. STOP taking these medications       furosemide (LASIX) 40 mg tablet Comments:   Reason for Stopping:         lisinopril (PRINIVIL, ZESTRIL) 10 mg tablet Comments:   Reason for Stopping:                Follow-up Information     Follow up With Details Comments [address] [place], NP On 2/16/2018 11:20 AM [address]  [address] [facility]      Dr Lex Esquivel at [facility] In 1 week Discharge follow up            Final discharge diagnoses and brief hospital course  Please also refer to the admission H&P for details on the presenting problem. 80-year-old male with past medical history of CHF, hypertension, DVT, AAA, atherosclerosis, right inguinal hernia, cholelithiasis, BPH, who presents to the hospital complaining of SOB       Acute on chronic systolic CHF, NYHA class IV on admission, NYHA III today  -Echo with EF 20% with severe MR and moderate TR  -On IV diuresis switched to oral 2/10, c/w Bumex PO  -Strict I/Os, continue clinton  -Daily weight  -Appreciate Cardiology input  -On ASA/Lipitor/Losartan/Metoprolol XL      A fib with RVR  -Was on cardizem drip, off on 2/10  -Continue Metoprolol  -On IV heparin, family still refusing oral anticoagulation  -ASA started 2/9     Chest pain with a history of DVT  -V/Q scan low probability for PE  -D dimer 7.8  -Troponin elevated  -Cardiology on board     CKD stage III  -stable  -monitor     History of AAA  -5.2 cm in diameter per US  -Outpatient follow up     Hypokalemia  -resolved     Transaminitis  -US abd 2/9 A mildly echogenic and heterogeneous liver can be seen with steatosis or  other nonspecific parenchymal liver disease. There is no focal liver mass. Gallstones without biliary duct dilatation  -On Statin, will continue as repeat CMP improved  -Monitor     History of CVA  -on ASA/Statin     History of concussion  -The wife claims that the patient gets \"more confused at a strange place\".  She says that the patient does not have Dementia     BPH  -on Flomax/Proscar       FOLLOW-UP TESTS recommended:  - Your Toprol XL increased to 25mg daily  - Stop Lisinopril and Lasix  - Starting Losartan and Bumex  - follow up with Cardiologist as above     PENDING TEST RESULTS:  At the time of your discharge the following test results are still pending: none  Please make sure you review these results with your outpatient follow-up provider(s).     SYMPTOMS to watch for: chest pain, shortness of breath, fever, chills, nausea, vomiting, diarrhea, change in mentation, falling, weakness, bleeding.     DIET/what to eat:  Cardiac Diet and Diabetic Diet     ACTIVITY:  Activity as tolerated     WOUND CARE: NONE     EQUIPMENT needed:  NONE    Physical examination at discharge  Visit Vitals    /60 (BP 1 Location: Right arm, BP Patient Position: Sitting)    Pulse 88    Temp 98.3 °F (36.8 °C)    Resp 18    Ht 5' 9\" (1.753 m)    Wt 70.3 kg (155 lb)    SpO2 96%    BMI 22.89 kg/m2       Alert and awake  PERRLA  EOMI  Lung: CTA  CVS: Irregularly irregular  ABd: soft NT ND  Ext: no edema    Pertinent imaging studies:    ECHO:  SUMMARY:  Left ventricle: The ventricle was mildly dilated. Systolic function was  severely reduced. Ejection fraction was estimated to be 20 %. There was  severe diffuse hypokinesis with regional variations. Left atrium: The atrium was dilated. Mitral valve: There was severe regurgitation. Aortic valve: Leaflets exhibited mildly to moderately increased thickness,  mild to moderate calcification, mildly reduced cuspal separation, reduced  mobility, and sclerosis. Tricuspid valve: There was moderate regurgitation.     Recent Labs      02/11/18   0325   WBC  4.8   HGB  10.8*   HCT  32.1*   PLT  120*     Recent Labs      02/13/18   0458  02/12/18   0349  02/11/18   0325   NA  134*  137  139   K  4.0  3.6  3.5   CL  101  100  101   CO2  24  30  28   BUN  30*  33*  38*   CREA  1.24  1.39*  1.48*   GLU  92  88  97   CA  7.5*  7.5*  7.1*   MG  1.8  1.8  1.8     Recent Labs      02/11/18   0325   SGOT  61*   AP  101   TP  6.3*   ALB  2.4*   GLOB  3.9     Recent Labs      02/12/18   1046  02/12/18   0349 02/11/18 2042   APTT  71.0*  59.1*  54.2*      No results for input(s): FE, TIBC, PSAT, FERR in the last 72 hours. No results for input(s): PH, PCO2, PO2 in the last 72 hours. No results for input(s): CPK, CKMB in the last 72 hours. No lab exists for component: TROPONINI  No components found for: [place]    Chronic Diagnoses:    Problem List as of 2/13/2018  Date Reviewed: 2/8/2018          Codes Class Noted - Resolved    * (Principal)SOB (shortness of breath) ICD-10-CM: R06.02  ICD-9-CM: 786.05  2/8/2018 - Present              Time spent on discharge related activities today greater than 30 minutes.       Signed:  Adenike Moran MD                 Hospitalist, Internal Medicine      Cc: Crystal Raines MD

## 2018-02-13 NOTE — DISCHARGE INSTRUCTIONS
Please bring this form with you to show your primary care provider at your follow-up appointment. Primary care provider:  Dr. Jj Fabian MD    Discharging provider:  Jay Hernandez MD    You have been admitted to the hospital with the following diagnoses:  · SOB (shortness of breath)    FOLLOW-UP CARE RECOMMENDATIONS:    APPOINTMENTS:  Follow-up Information     Follow up With Details Comments [address], NP On 2/16/2018 11:20  [address]  [phone]      Dr Brandon Bowens at [facility] In 1 week Discharge follow up              FOLLOW-UP TESTS recommended:  - Your Toprol XL increased to 25mg daily  - Stop Lisinopril and Lasix  - Starting Losartan and Bumex  - follow up with Cardiologist as above    PENDING TEST RESULTS:  At the time of your discharge the following test results are still pending: none  Please make sure you review these results with your outpatient follow-up provider(s). SYMPTOMS to watch for: chest pain, shortness of breath, fever, chills, nausea, vomiting, diarrhea, change in mentation, falling, weakness, bleeding. DIET/what to eat:  Cardiac Diet and Diabetic Diet    ACTIVITY:  Activity as tolerated    WOUND CARE: NONE    EQUIPMENT needed:  NONE      What to do if new or unexpected symptoms occur? If you experience any of the above symptoms (or should other concerns or questions arise after discharge) please call your primary care physician. Return to the emergency room if you cannot get hold of your doctor. · It is very important that you keep your follow-up appointment(s). · Please bring discharge papers, medication list (and/or medication bottles) to your follow-up appointments for review by your outpatient provider(s). · Please check the list of medications and be sure it includes every medication (even non-prescription medications) that your provider wants you to take.     · It is important that you take the medication exactly as they are prescribed. · Keep your medication in the bottles provided by the pharmacist and keep a list of the medication names, dosages, and times to be taken in your wallet. · Do not take other medications without consulting your doctor. · If you have any questions about your medications or other instructions, please talk to your nurse or care provider before you leave the hospital.    I understand that if any problems occur once I am at home I am to contact my physician. These instructions were explained to me and I had the opportunity to ask questions.

## 2018-02-13 NOTE — PROGRESS NOTES
[facility] Progress Note    2/13/2018 9:30 AM  Admit Date: 2/8/2018  Admit Diagnosis: SOB (shortness of breath)    Assessment/Plan     1. Acute on chronic systolic heart failure - LVEF 20% by TTE, NYHA class IV on admission and class III today, ProBNP 17,119 on admission with pulmonary edema on CXR  -continue diuresis with bumex 1mg PO BID  -continue losartan 12.5mg daily and continue Toprol XL 50mg daily (limited due to hypotension)   -ordered 6 minute walk prior to discharge to assess functional capacity for CHF  -ordered case management consult for one time home health visit at discharge for CHF  -will follow up with Rafael England NP on 2/16 at 11:20 AM and then will return to the [facility] ([facility] AT [place] for cardiology follow up (discussed with patient today, RN plans to discuss this with wife later today)  2. Afib with RVR - currently rate controlled on Toprol XL, [id number]= 6 (age, CHF, HTN, CVA), will stop IV heparin for anticoagulation today and anticoagulate with Lovenox until discharge, wife/pt refuse [address] so continue ASA 81 mg daily at discharge    3. Chest pain with elevated troponin - chest pain resolved, troponin elevation non-specific in the setting of CKD and Atrial Fib with RVR, 12 lead EKG with no ischemic changes noted  -D-dimer 7.8 but VQ scan with low probability for PE   -continue ASA, statin and Toprol XL   4. Hx of HTN - well controlled on losartan 12.5mg daily and  Toprol XL 50mg daily   5. CKD stage 3 - creatinine stable at 1.2, it was 1.6 at [address] [place] [address] on 2/4/18    6. Hx of DVT - uncertain date, not on [address], will transition IV heparin to Lovenox today while admitted, continue ASA 81mg daily at discharge, no DVT by venous duplex   7. ? Hx of AAA per [facility] records  8. Elevated liver enzymes - improved, on statin, management per primary team  9. Hx of TBI - no detail known  10. Hx of CVA - noted on head CT, on ASA and statin   11.   Severe MR, mod TR - by TTE, continue bumex  12. Hypokalemia - resolved   13. Hypomagnesemia - Mg 1.8, will give Mg Sulfate 2g IV once now  14. Advanced directives - currently full code, palliative care consultation pending, patient and wife seem to have little insight into pt's chronic health diseases, concern for end stage CHF with limited management options, patient's wife also refuses SNF or rehab at discharge per case management    Echo 2/18 - LVEF mildly dilated, LVEF 20 %, severe diffuse hypokinesis with regional variations, dilated LA, severe MR, mod TR, AoV sclerosis    Subjective: Mishel Glasgow denies chest pain or dyspnea at rest.  Denies palpitations. He is sitting up in the chair eating breakfast this AM.  No family at bedside. Briefly discussed CHF and EF 20% and the importance of taking medications and coming to follow up visits as OP. Discussed plan to follow up in our office x 1 and then the need to establish cardiovascular care at the [facility] for long term management. Objective:      Physical Exam:  Visit Vitals    /60 (BP 1 Location: Right arm, BP Patient Position: Sitting)    Pulse 88    Temp 98.3 °F (36.8 °C)    Resp 18    Ht 5' 9\" (1.753 m)    Wt 155 lb (70.3 kg)    SpO2 96%    BMI 22.89 kg/m2     General Appearance:  Well developed, well nourished, alert and oriented x 3, in no acute distress. Ears/Nose/Mouth/Throat:   Hearing grossly normal.         Neck: Supple. Chest:   Coarse breath sounds bilaterally. Cardiovascular:  Irregular rate and rhythm, S1, S2 normal, 2/6 BERNADETTE    Abdomen:   Soft, non-tender, bowel sounds are active. Extremities: Trace LE edema     Skin: Warm and dry.            Telemetry: AFIB with PVCs, having some 4 beat runs of NSVT    Data Review:   Labs:    Recent Results (from the past 24 hour(s))   PTT    Collection Time: 02/12/18 10:46 AM   Result Value Ref Range    aPTT 71.0 (H) 22.1 - 32.5 sec    aPTT, therapeutic range     58.0 - 77.0 SECS   MAGNESIUM    Collection Time: 02/13/18  4:58 AM   Result Value Ref Range    Magnesium 1.8 1.6 - 2.4 mg/dL   METABOLIC PANEL, BASIC    Collection Time: 02/13/18  4:58 AM   Result Value Ref Range    Sodium 134 (L) 136 - 145 mmol/L    Potassium 4.0 3.5 - 5.1 mmol/L    Chloride 101 97 - 108 mmol/L    CO2 24 21 - 32 mmol/L    Anion gap 9 5 - 15 mmol/L    Glucose 92 65 - 100 mg/dL    BUN 30 (H) 6 - 20 MG/DL    Creatinine 1.24 0.70 - 1.30 MG/DL    BUN/Creatinine ratio 24 (H) 12 - 20      GFR est AA >60 >60 ml/min/1.73m2    GFR est non-AA 55 (L) >60 ml/min/1.73m2    Calcium 7.5 (L) 8.5 - 10.1 MG/DL           Current Facility-Administered Medications   Medication Dose Route Frequency    magnesium sulfate 2 g/50 ml IVPB (premix or compounded)  2 g IntraVENous ONCE    enoxaparin (LOVENOX) injection 70 mg  1 mg/kg SubCUTAneous Q12H    albuterol-ipratropium (DUO-NEB) 2.5 MG-0.5 MG/3 ML  3 mL Nebulization TID RT    metoprolol succinate (TOPROL-XL) XL tablet 50 mg  50 mg Oral DAILY    bumetanide (BUMEX) tablet 1 mg  1 mg Oral BID    losartan (COZAAR) tablet 12.5 mg  12.5 mg Oral DAILY    sodium chloride (NS) flush 5-10 mL  5-10 mL IntraVENous Q8H    sodium chloride (NS) flush 5-10 mL  5-10 mL IntraVENous PRN    acetaminophen (TYLENOL) tablet 650 mg  650 mg Oral Q4H PRN    aspirin chewable tablet 81 mg  81 mg Oral DAILY    atorvastatin (LIPITOR) tablet 40 mg  40 mg Oral DAILY    finasteride (PROSCAR) tablet 5 mg  5 mg Oral DAILY    tamsulosin (FLOMAX) capsule 0.4 mg  0.4 mg Oral DAILY    nitroglycerin (NITROSTAT) tablet 0.4 mg  0.4 mg SubLINGual Q5MIN PRN    influenza vaccine 2017-18 (3 yrs+)(PF) (FLUZONE QUAD/FLUARIX QUAD) injection 0.5 mL  0.5 mL IntraMUSCular PRIOR TO DISCHARGE       Mark Darby NP  [facility] [address] [place] [address] [address] [facility] [address]  Aleena Lovett  [phone]    Velia Santillan MD Agree with above.  [facility] follow-up

## 2018-02-13 NOTE — PROGRESS NOTES
CM found out from [facility] representative that patient is open to [facility] -- call agency and confirmed patient is open to their services for SN and PT - referral sent via [employer] and will also send orders as well. Noted discharge to home today.  PAUL Osei

## 2018-02-14 ENCOUNTER — PATIENT OUTREACH (OUTPATIENT)
Dept: CARDIOLOGY CLINIC | Age: 83
End: 2018-02-14

## 2018-02-14 NOTE — PROGRESS NOTES
Salomon Ellis is a 80 y.o. male   This patient was received as a referral from  inpatient admission /High risk report/ provider referral. Call to and reached pt who said to talk to his wife - she does his appts, and management. Reached wife - Charisse - at dialysis - at # [phone]. Will call her this afternoon. Talked to [facility] nurse - Tory Valerio - [phone]/ this is resumption of care - she assisted him going to hospital - with bilateral leg edema, incontinence, pulmonary congestions, and lethargy noted - Next [facility] visit 2/15 - and she will do referral for  -   Pt lives with wife - who is on dialysis and has left foot toes amputated - she is having multiple health issues, as well as caring for him ; no children - 1 nephew Tyler Whittaker - [phone], who helps as he can. [facility] nurse will evaluate safety concerns in the home - we discussed need for advanced directive. Inpatient RRAT Score: 17 / High  Patient's challenges to self management identified:  ineffective coping, lack of knowledge about disease, medication management, support system, transportation/ pt age 80; wife is on dialysis M/W/F no children- pt demonstrates lack of memory - noted in discharge note and palliative physician note. Medication Management:  good adherence, good understanding / will determine who does the administration and oversight  Summary of patients top  problems:     Problem 1: Acute on chronic Systolic CHF, NYHA class IV on admission, class III at discharge   EF 20%   Daily weights in the home. Lasix d/c bumex started. bumex 1 mg bid, Cozaar 12.5 mg every day, Metoprolol 50 mg every day -    Stop Lasix 40 , Lisinopril 10 -      Problem 2: A fib with RVR   Metoprolol dosing was adjusted to 50 mg every day;     Tayla Flora was started on 2/9 -    Can pt monitor bp and pulse at home - discussed discharge with home health nurse     Problem 3: CKD stage III     Problem 4: Transaminitis -    Monitor CMP labs - with [facility] and [facility]     Problem 5: Sensorium - history of CVA/ pt reports concussion in the past - wife denied dementia in hospital visit 2/9/18. Problem 6: BPH - on flomax and Proscar   Monitor for s/s UTI - post [place]inton. Patients motivational level on a scale of 0-10: pt demonstrates some degree of confusion/ relies on his wife for appts and condition management  Advance Care Planning:   Patient was offered the opportunity to discuss advance care planning: yes     Does patient have an Advance Directive:  no   If no, did you provide information on Advance Care Planning? Yes - given in hospital; will follow up     [employer], Referrals, and Durable Medical Equipment: One Judy Scott Exon with resumption 2/15/18 -   Requested  consultation     Follow up appointments:  2/16/18 - with Kamla Solomon NP.  __________________________________________________________________________________________________________  Cristian Becker nurse - next visit 2/15/18 -   Provided phone #i - [phone] fax - [phone] for [facility] discharge records  Pt's wife - alternate #s [phone], [address] - family - [phone]  Luke Standard -           [phone], [phone] -   Wife is dialysis pt - M/W/F -     Prior to admission - pt had lower extremity edema, lethargy, pulmonary congestion, and was incontinent. Addendum - call back and reached Mrs. Villalta - and she is still at dialysis - we will talk tomorrow.   Will work towards getting Mr. Tarsha Saavedra to [place] as soon as possible for needed follow up    Laly Carrillo RN , [address], [facility]   [address] [facility]  [phone]

## 2018-02-15 ENCOUNTER — PATIENT OUTREACH (OUTPATIENT)
Dept: CARDIOLOGY CLINIC | Age: 83
End: 2018-02-15
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
## 2018-02-16 ENCOUNTER — PATIENT OUTREACH (OUTPATIENT)
Dept: CARDIOLOGY CLINIC | Age: 83
End: 2018-02-16
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
## 2018-03-09 ENCOUNTER — APPOINTMENT (OUTPATIENT)
Dept: GENERAL RADIOLOGY | Age: 83
End: 2018-03-09
Attending: EMERGENCY MEDICINE
Payer: MEDICARE

## 2018-03-09 ENCOUNTER — HOSPITAL ENCOUNTER (OUTPATIENT)
Age: 83
Setting detail: OBSERVATION
Discharge: HOME HEALTH | End: 2018-03-13
Attending: EMERGENCY MEDICINE | Admitting: FAMILY MEDICINE
Payer: MEDICARE

## 2018-03-09 ENCOUNTER — APPOINTMENT (OUTPATIENT)
Dept: CT IMAGING | Age: 83
End: 2018-03-09
Attending: EMERGENCY MEDICINE
Payer: MEDICARE

## 2018-03-09 DIAGNOSIS — R06.02 SOB (SHORTNESS OF BREATH): Primary | ICD-10-CM

## 2018-03-09 DIAGNOSIS — R09.02 HYPOXIA: ICD-10-CM

## 2018-03-09 PROBLEM — R07.9 CHEST PAIN: Status: ACTIVE | Noted: 2018-03-09

## 2018-03-09 LAB
ALBUMIN SERPL-MCNC: 3.2 G/DL (ref 3.5–5)
ALBUMIN/GLOB SERPL: 0.6 {RATIO} (ref 1.1–2.2)
ALP SERPL-CCNC: 147 U/L (ref 45–117)
ALT SERPL-CCNC: 22 U/L (ref 12–78)
ANION GAP SERPL CALC-SCNC: 12 MMOL/L (ref 5–15)
AST SERPL-CCNC: 31 U/L (ref 15–37)
BASOPHILS # BLD: 0.1 K/UL (ref 0–0.1)
BASOPHILS NFR BLD: 1 % (ref 0–1)
BILIRUB SERPL-MCNC: 0.9 MG/DL (ref 0.2–1)
BNP SERPL-MCNC: ABNORMAL PG/ML (ref 0–450)
BUN SERPL-MCNC: 22 MG/DL (ref 6–20)
BUN/CREAT SERPL: 14 (ref 12–20)
CALCIUM SERPL-MCNC: 8 MG/DL (ref 8.5–10.1)
CHLORIDE SERPL-SCNC: 110 MMOL/L (ref 97–108)
CK SERPL-CCNC: 146 U/L (ref 39–308)
CO2 SERPL-SCNC: 21 MMOL/L (ref 21–32)
CREAT SERPL-MCNC: 1.53 MG/DL (ref 0.7–1.3)
DIFFERENTIAL METHOD BLD: ABNORMAL
EOSINOPHIL # BLD: 0.1 K/UL (ref 0–0.4)
EOSINOPHIL NFR BLD: 2 % (ref 0–7)
ERYTHROCYTE [DISTWIDTH] IN BLOOD BY AUTOMATED COUNT: 18.2 % (ref 11.5–14.5)
GLOBULIN SER CALC-MCNC: 5.1 G/DL (ref 2–4)
GLUCOSE SERPL-MCNC: 102 MG/DL (ref 65–100)
HCT VFR BLD AUTO: 34 % (ref 36.6–50.3)
HGB BLD-MCNC: 11.1 G/DL (ref 12.1–17)
IMM GRANULOCYTES # BLD: 0 K/UL (ref 0–0.04)
IMM GRANULOCYTES NFR BLD AUTO: 0 % (ref 0–0.5)
LYMPHOCYTES # BLD: 1.3 K/UL (ref 0.8–3.5)
LYMPHOCYTES NFR BLD: 18 % (ref 12–49)
MAGNESIUM SERPL-MCNC: 1.3 MG/DL (ref 1.6–2.4)
MCH RBC QN AUTO: 32.4 PG (ref 26–34)
MCHC RBC AUTO-ENTMCNC: 32.6 G/DL (ref 30–36.5)
MCV RBC AUTO: 99.1 FL (ref 80–99)
MONOCYTES # BLD: 0.7 K/UL (ref 0–1)
MONOCYTES NFR BLD: 10 % (ref 5–13)
NEUTS SEG # BLD: 4.9 K/UL (ref 1.8–8)
NEUTS SEG NFR BLD: 69 % (ref 32–75)
NRBC # BLD: 0.05 K/UL (ref 0–0.01)
NRBC BLD-RTO: 0.7 PER 100 WBC
PLATELET # BLD AUTO: 137 K/UL (ref 150–400)
PMV BLD AUTO: 12.8 FL (ref 8.9–12.9)
POTASSIUM SERPL-SCNC: 3.1 MMOL/L (ref 3.5–5.1)
PROT SERPL-MCNC: 8.3 G/DL (ref 6.4–8.2)
RBC # BLD AUTO: 3.43 M/UL (ref 4.1–5.7)
RBC MORPH BLD: ABNORMAL
SODIUM SERPL-SCNC: 143 MMOL/L (ref 136–145)
TROPONIN I SERPL-MCNC: 0.06 NG/ML
WBC # BLD AUTO: 7.1 K/UL (ref 4.1–11.1)

## 2018-03-09 PROCEDURE — 82550 ASSAY OF CK (CPK): CPT | Performed by: EMERGENCY MEDICINE

## 2018-03-09 PROCEDURE — 85025 COMPLETE CBC W/AUTO DIFF WBC: CPT | Performed by: EMERGENCY MEDICINE

## 2018-03-09 PROCEDURE — 71046 X-RAY EXAM CHEST 2 VIEWS: CPT

## 2018-03-09 PROCEDURE — 83735 ASSAY OF MAGNESIUM: CPT | Performed by: EMERGENCY MEDICINE

## 2018-03-09 PROCEDURE — 74011000258 HC RX REV CODE- 258: Performed by: EMERGENCY MEDICINE

## 2018-03-09 PROCEDURE — 74011250636 HC RX REV CODE- 250/636: Performed by: EMERGENCY MEDICINE

## 2018-03-09 PROCEDURE — 96365 THER/PROPH/DIAG IV INF INIT: CPT

## 2018-03-09 PROCEDURE — 65390000012 HC CONDITION CODE 44 OBSERVATION

## 2018-03-09 PROCEDURE — 93005 ELECTROCARDIOGRAM TRACING: CPT

## 2018-03-09 PROCEDURE — 93971 EXTREMITY STUDY: CPT

## 2018-03-09 PROCEDURE — 83880 ASSAY OF NATRIURETIC PEPTIDE: CPT | Performed by: EMERGENCY MEDICINE

## 2018-03-09 PROCEDURE — 36415 COLL VENOUS BLD VENIPUNCTURE: CPT | Performed by: EMERGENCY MEDICINE

## 2018-03-09 PROCEDURE — 84484 ASSAY OF TROPONIN QUANT: CPT | Performed by: EMERGENCY MEDICINE

## 2018-03-09 PROCEDURE — 80053 COMPREHEN METABOLIC PANEL: CPT | Performed by: EMERGENCY MEDICINE

## 2018-03-09 PROCEDURE — 84443 ASSAY THYROID STIM HORMONE: CPT | Performed by: FAMILY MEDICINE

## 2018-03-09 PROCEDURE — 74011636320 HC RX REV CODE- 636/320: Performed by: EMERGENCY MEDICINE

## 2018-03-09 PROCEDURE — 65660000000 HC RM CCU STEPDOWN

## 2018-03-09 PROCEDURE — 71275 CT ANGIOGRAPHY CHEST: CPT

## 2018-03-09 PROCEDURE — 99285 EMERGENCY DEPT VISIT HI MDM: CPT

## 2018-03-09 RX ORDER — GUAIFENESIN 100 MG/5ML
81 LIQUID (ML) ORAL DAILY
Status: DISCONTINUED | OUTPATIENT
Start: 2018-03-10 | End: 2018-03-13 | Stop reason: HOSPADM

## 2018-03-09 RX ORDER — NITROGLYCERIN 0.4 MG/1
0.4 TABLET SUBLINGUAL
Status: DISCONTINUED | OUTPATIENT
Start: 2018-03-09 | End: 2018-03-13 | Stop reason: HOSPADM

## 2018-03-09 RX ORDER — BUMETANIDE 0.25 MG/ML
1 INJECTION INTRAMUSCULAR; INTRAVENOUS 2 TIMES DAILY
Status: DISCONTINUED | OUTPATIENT
Start: 2018-03-10 | End: 2018-03-13 | Stop reason: HOSPADM

## 2018-03-09 RX ORDER — SODIUM CHLORIDE 0.9 % (FLUSH) 0.9 %
5-10 SYRINGE (ML) INJECTION EVERY 8 HOURS
Status: DISCONTINUED | OUTPATIENT
Start: 2018-03-09 | End: 2018-03-13 | Stop reason: HOSPADM

## 2018-03-09 RX ORDER — ATORVASTATIN CALCIUM 40 MG/1
40 TABLET, FILM COATED ORAL DAILY
Status: DISCONTINUED | OUTPATIENT
Start: 2018-03-10 | End: 2018-03-13 | Stop reason: HOSPADM

## 2018-03-09 RX ORDER — METOPROLOL SUCCINATE 50 MG/1
50 TABLET, EXTENDED RELEASE ORAL DAILY
Status: DISCONTINUED | OUTPATIENT
Start: 2018-03-10 | End: 2018-03-13 | Stop reason: HOSPADM

## 2018-03-09 RX ORDER — GUAIFENESIN 100 MG/5ML
81 LIQUID (ML) ORAL DAILY
Status: DISCONTINUED | OUTPATIENT
Start: 2018-03-10 | End: 2018-03-09 | Stop reason: SDUPTHER

## 2018-03-09 RX ORDER — MAGNESIUM SULFATE HEPTAHYDRATE 40 MG/ML
2 INJECTION, SOLUTION INTRAVENOUS ONCE
Status: COMPLETED | OUTPATIENT
Start: 2018-03-09 | End: 2018-03-12

## 2018-03-09 RX ORDER — HYDRALAZINE HYDROCHLORIDE 25 MG/1
25 TABLET, FILM COATED ORAL 3 TIMES DAILY
COMMUNITY
End: 2018-03-09

## 2018-03-09 RX ORDER — HEPARIN SODIUM 5000 [USP'U]/ML
5000 INJECTION, SOLUTION INTRAVENOUS; SUBCUTANEOUS EVERY 8 HOURS
Status: DISCONTINUED | OUTPATIENT
Start: 2018-03-10 | End: 2018-03-13 | Stop reason: HOSPADM

## 2018-03-09 RX ORDER — SODIUM CHLORIDE 0.9 % (FLUSH) 0.9 %
10 SYRINGE (ML) INJECTION
Status: COMPLETED | OUTPATIENT
Start: 2018-03-09 | End: 2018-03-09

## 2018-03-09 RX ORDER — POTASSIUM CHLORIDE 7.45 MG/ML
10 INJECTION INTRAVENOUS
Status: COMPLETED | OUTPATIENT
Start: 2018-03-09 | End: 2018-03-12

## 2018-03-09 RX ORDER — LOSARTAN POTASSIUM 25 MG/1
12.5 TABLET ORAL DAILY
Status: DISCONTINUED | OUTPATIENT
Start: 2018-03-10 | End: 2018-03-13 | Stop reason: HOSPADM

## 2018-03-09 RX ORDER — FINASTERIDE 5 MG/1
5 TABLET, FILM COATED ORAL DAILY
Status: DISCONTINUED | OUTPATIENT
Start: 2018-03-10 | End: 2018-03-13 | Stop reason: HOSPADM

## 2018-03-09 RX ORDER — SODIUM CHLORIDE 0.9 % (FLUSH) 0.9 %
5-10 SYRINGE (ML) INJECTION AS NEEDED
Status: DISCONTINUED | OUTPATIENT
Start: 2018-03-09 | End: 2018-03-13 | Stop reason: HOSPADM

## 2018-03-09 RX ORDER — LANOLIN ALCOHOL/MO/W.PET/CERES
1000 CREAM (GRAM) TOPICAL DAILY
Status: DISCONTINUED | OUTPATIENT
Start: 2018-03-10 | End: 2018-03-13 | Stop reason: HOSPADM

## 2018-03-09 RX ORDER — TAMSULOSIN HYDROCHLORIDE 0.4 MG/1
0.4 CAPSULE ORAL DAILY
Status: DISCONTINUED | OUTPATIENT
Start: 2018-03-10 | End: 2018-03-13 | Stop reason: HOSPADM

## 2018-03-09 RX ADMIN — SODIUM CHLORIDE 100 ML: 900 INJECTION, SOLUTION INTRAVENOUS at 22:32

## 2018-03-09 RX ADMIN — Medication 10 ML: at 22:32

## 2018-03-09 RX ADMIN — IOPAMIDOL 70 ML: 755 INJECTION, SOLUTION INTRAVENOUS at 22:32

## 2018-03-09 RX ADMIN — POTASSIUM CHLORIDE 10 MEQ: 7.46 INJECTION, SOLUTION INTRAVENOUS at 22:18

## 2018-03-09 NOTE — IP AVS SNAPSHOT
[address] 
[phone] Patient: Home Gill MRN: [id number] MTH:8/84/[id number] About your hospitalization You were admitted on:  March 9, 2018 You last received care in the:  [facility] 6S NEURO-SCI TELE You were discharged on:  March 13, 2018 Why you were hospitalized Your primary diagnosis was:  Sob (Shortness Of Breath) Your diagnoses also included:  Chest Pain, Shortness Of Breath Follow-up Information Follow up With Details Comments Contact Info Crystal Raines MD   Patient can only remember the practice name and not the physician [facility] PT, OT and SN/Please call this agency when you get home. [address] [facility], [address] [place] [address],[address] [place] [address] 
[phone] Your Scheduled Appointments Friday March 16, 2018 11:20 AM EDT  
ESTABLISHED PATIENT with Meliza Briggs MD  
[facility] ([address]) [address] [place] [address] [address] [place] [address]  
[phone] Discharge Orders None A check dana indicates which time of day the medication should be taken. My Medications START taking these medications Instructions Each Dose to Equal  
 Morning Noon Evening Bedtime  
 potassium chloride SR 10 mEq tablet Commonly known as:  KLOR-CON 10 Start taking on:  3/14/2018 Your last dose was: Your next dose is: Take 3 Tabs by mouth daily. 30 mEq CONTINUE taking these medications Instructions Each Dose to Equal  
 Morning Noon Evening Bedtime  
 aspirin 81 mg chewable tablet Your last dose was: Your next dose is: Take 81 mg by mouth daily. 81 mg  
    
   
   
   
  
 atorvastatin 80 mg tablet Commonly known as:  LIPITOR Your last dose was: Your next dose is: Take 40 mg by mouth daily. 40 mg  
    
   
   
   
  
 bumetanide 1 mg tablet Commonly known as:  Salvatore Franchesca Your last dose was: Your next dose is: Take 1 Tab by mouth two (2) times a day. 1 mg  
    
   
   
   
  
 cyanocobalamin 1,000 mcg tablet Your last dose was: Your next dose is: Take 1,000 mcg by mouth daily. 1000 mcg  
    
   
   
   
  
 finasteride 5 mg tablet Commonly known as:  PROSCAR Your last dose was: Your next dose is: Take 5 mg by mouth daily. 5 mg  
    
   
   
   
  
 losartan 25 mg tablet Commonly known as:  COZAAR Your last dose was: Your next dose is: Take 0.5 Tabs by mouth daily. 12.5 mg  
    
   
   
   
  
 metoprolol succinate 50 mg XL tablet Commonly known as:  TOPROL XL Your last dose was: Your next dose is: Take 1 Tab by mouth daily. 50 mg MULTIVITAMIN AND MINERALS PO Your last dose was: Your next dose is: Take 1 Tab by mouth daily. 1 Tab  
    
   
   
   
  
 tamsulosin 0.4 mg capsule Commonly known as:  FLOMAX Your last dose was: Your next dose is: Take 0.4 mg by mouth daily. 0.4 mg Where to Get Your Medications Information on where to get these meds will be given to you by the nurse or doctor. ! Ask your nurse or doctor about these medications  
  potassium chloride SR 10 mEq tablet Discharge Instructions Discharge Instructions PATIENT ID: Danelle oCle MRN: [id number] YOB: 1931 DATE OF ADMISSION: 3/9/2018  8:31 PM   
DATE OF DISCHARGE: 3/13/2018 PRIMARY CARE PROVIDER: Crystal Raines MD  
 
ATTENDING PHYSICIAN: Hang Yee MD 
DISCHARGING PROVIDER: Hang Yee MD   
 To contact this individual call [phone] and ask the  to page. If unavailable ask to be transferred the Adult Hospitalist Department. DISCHARGE DIAGNOSES Acute on chronic systolic heart failure, NYHA Class III (POA) Hypokalemia (POA): Hypomagnesia (POA): Atrial fibrillation: 
Recent diarrhea: 
Hx of BPH: 
CKD, stage III: 
 
CONSULTATIONS: IP CONSULT TO HOSPITALIST 
IP CONSULT TO CARDIOLOGY PROCEDURES/SURGERIES: * No surgery found * PENDING TEST RESULTS:  
At the time of discharge the following test results are still pending: none FOLLOW UP APPOINTMENTS:  
Follow up with David Parekh MD on 3/16/2018 at 11:00 amd 
Follow up with your Primary Care Physician in 2-3 days Follow up with your Cardiologist at [place] in 2-3 days ADDITIONAL CARE RECOMMENDATIONS:  
 
DIET: Cardiac ACTIVITY: Activity as tolerated WOUND CARE: none EQUIPMENT needed: none DISCHARGE MEDICATIONS: 
 See Medication Reconciliation Form · It is important that you take the medication exactly as they are prescribed. · Keep your medication in the bottles provided by the pharmacist and keep a list of the medication names, dosages, and times to be taken in your wallet. · Do not take other medications without consulting your doctor. NOTIFY YOUR PHYSICIAN FOR ANY OF THE FOLLOWING:  
Fever over 101 degrees for 24 hours. Chest pain, shortness of breath, fever, chills, nausea, vomiting, diarrhea, change in mentation, falling, weakness, bleeding. Severe pain or pain not relieved by medications. Or, any other signs or symptoms that you may have questions about. DISPOSITION: 
x  Home With: 
x OT x PT x HH x RN  
  
 SNF/Inpatient Rehab/LTAC Independent/assisted living Hospice Other: CDMP Checked:  
Yes x PROBLEM LIST Updated: 
Yes x Signed:  
Eva Vogel MD 
3/13/2018 
3:18 PM 
 
  
  
  
[employer] Announcement We are excited to announce that we are making your provider's discharge notes available to you in K[employer]. You will see these notes when they are completed and signed by the physician that discharged you from your recent hospital stay. If you have any questions or concerns about any information you see in [employer], please call the Health Information Department where you were seen or reach out to your Primary Care Provider for more information about your plan of care. Introducing [facility]! Edie Villa introduces [employer] patient portal. Now you can access parts of your medical record, email your doctor's office, and request medication refills online. 1. In your internet browser, go to [email or web address]://[employer] Network. [employer] 2. Click on the First Time User? Click Here link in the Sign In box. You will see the New Member Sign Up page. 3. Enter your [employer] Access Code exactly as it appears below. You will not need to use this code after youve completed the sign-up process. If you do not sign up before the expiration date, you must request a new code. · [employer] Access Code: [email or web address] Expires: 5/10/2018  2:44 PM 
 
4. Enter the last four digits of your Social Security Number (xxxx) and Date of Birth (mm/dd/yyyy) as indicated and click Submit. You will be taken to the next sign-up page. 5. Create a K[employer] C[employer] ID. This will be your [employer] C[employer] login ID and cannot be changed, so think of one that is secure and easy to remember. 6. Create a [employer] password. You can change your password at any time. 7. Enter your Password Reset Question and Answer. This can be used at a later time if you forget your password. 8. Enter your e-mail address. You will receive e-mail notification when new information is available in [address] 9. Click Sign Up. You can now view and download portions of your medical record.  
10. Click the Download Summary menu link to download a portable copy of your medical information. If you have questions, please visit the Frequently Asked Questions section of the MyChart website. Remember, [employer] is NOT to be used for urgent needs. For medical emergencies, dial 911. Now available from your iPhone and Android! Providers Seen During Your Hospitalization Provider Specialty Primary office phone Rony Chao MD Emergency Medicine [phone] Grazyna Ferrara MD Hospitalist [phone] Lesa Larios MD Internal Medicine [phone] Mazin Eubanks MD Internal Medicine [phone] Ryan Mayo MD Internal Medicine [phone] Your Primary Care Physician (PCP) Primary Care Physician Office Phone Office Fax OTHER, PHYS ** None ** ** None ** You are allergic to the following No active allergies Recent Documentation Height Weight BMI Smoking Status 1.753 m 79.2 kg 25.8 kg/m2 Former Smoker Emergency Contacts Name Discharge Info Relation Home Work Mobile [address] CAREGIVER [3] Spouse [3] [phone] Patient Belongings The following personal items are in your possession at time of discharge: 
  Dental Appliances: With patient  Visual Aid: None      Home Medications: None   Jewelry: None  Clothing: At bedside    Other Valuables: None Discharge Instructions Attachments/References HEART FAILURE: AVOIDING TRIGGERS (ENGLISH) Patient Handouts Avoiding Triggers With Heart Failure: Care Instructions Your Care Instructions Triggers are anything that make your heart failure flare up. A flare-up is also called \"sudden heart failure\" or \"acute heart failure. \" When you have a flare-up, fluid builds up in your lungs, and you have problems breathing. You might need to go to the hospital. By watching for changes in your condition and avoiding triggers, you can prevent heart failure flare-ups. Follow-up care is a key part of your treatment and safety. Be sure to make and go to all appointments, and call your doctor if you are having problems. It's also a good idea to know your test results and keep a list of the medicines you take. How can you care for yourself at home? Watch for changes in your weight and condition · Weigh yourself without clothing at the same time each day. Record your weight. Call your doctor if you have sudden weight gain, such as more than 2 to 3 pounds in a day or 5 pounds in a week. (Your doctor may suggest a different range of weight gain.) A sudden weight gain may mean that your heart failure is getting worse. · Keep a daily record of your symptoms. Write down any changes in how you feel, such as new shortness of breath, cough, or problems eating. Also record if your ankles are more swollen than usual and if you feel more tired than usual. Note anything that you ate or did that could have triggered these changes. Limit sodium Sodium causes your body to hold on to extra water. This may cause your heart failure symptoms to get worse. People get most of their sodium from processed foods. Fast food and restaurant meals also tend to be very high in sodium. · Your doctor may suggest that you limit sodium to 2,000 milligrams (mg) a day or less. That is less than 1 teaspoon of salt a day, including all the salt you eat in cooking or in packaged foods. · Read food labels on cans and food packages. They tell you how much sodium you get in one serving. Check the serving size. If you eat more than one serving, you are getting more sodium. · Be aware that sodium can come in forms other than salt, including monosodium glutamate (MSG), sodium citrate, and sodium bicarbonate (baking soda). MSG is often added to Asian food. You can sometimes ask for food without MSG or salt. · Slowly reducing salt will help you adjust to the taste. Take the salt shaker off the table. · Flavor your food with garlic, lemon juice, onion, vinegar, herbs, and spices instead of salt. Do not use soy sauce, steak sauce, onion salt, garlic salt, mustard, or ketchup on your food, unless it is labeled \"low-sodium\" or \"low-salt. \" 
· Make your own salad dressings, sauces, and ketchup without adding salt. · Use fresh or frozen ingredients, instead of canned ones, whenever you can. Choose low-sodium canned goods. · Eat less processed food and food from restaurants, including fast food. Exercise as directed Moderate, regular exercise is very good for your heart. It improves your blood flow and helps control your weight. But too much exercise can stress your heart and cause a heart failure flare-up. · Check with your doctor before you start an exercise program. 
· Walking is an easy way to get exercise. Start out slowly. Gradually increase the length and pace of your walk. Swimming, riding a bike, and using a treadmill are also good forms of exercise. · When you exercise, watch for signs that your heart is working too hard. You are pushing yourself too hard if you cannot talk while you are exercising. If you become short of breath or dizzy or have chest pain, stop, sit down, and rest. 
· Do not exercise when you do not feel well. Take medicines correctly · Take your medicines exactly as prescribed. Call your doctor if you think you are having a problem with your medicine. · Make a list of all the medicines you take. Include those prescribed to you by other doctors and any over-the-counter medicines, vitamins, or supplements you take. Take this list with you when you go to any doctor. · Take your medicines at the same time every day. It may help you to post a list of all the medicines you take every day and what time of day you take them. · Make taking your medicine as simple as you can.  Plan times to take your medicines when you are doing other things, such as eating a meal or getting ready for bed. This will make it easier to remember to take your medicines. · Get organized. Use helpful tools, such as daily or weekly pill containers. When should you call for help? Call 911 if you have symptoms of sudden heart failure such as: 
? · You have severe trouble breathing. ? · You cough up pink, foamy mucus. ? · You have a new irregular or rapid heartbeat. ?Call your doctor now or seek immediate medical care if: 
? · You have new or increased shortness of breath. ? · You are dizzy or lightheaded, or you feel like you may faint. ? · You have sudden weight gain, such as more than 2 to 3 pounds in a day or 5 pounds in a week. (Your doctor may suggest a different range of weight gain.) ? · You have increased swelling in your legs, ankles, or feet. ? · You are suddenly so tired or weak that you cannot do your usual activities. ? Watch closely for changes in your health, and be sure to contact your doctor if you develop new symptoms. Where can you learn more? Go to http://shayne-jeet.info/. Enter W894 in the search box to learn more about \"Avoiding Triggers With Heart Failure: Care Instructions. \" Current as of: September 21, 2016 Content Version: 11.4 © 9341-7584 [employer]. Care instructions adapted under license by [employer] (which disclaims liability or warranty for this information). If you have questions about a medical condition or this instruction, always ask your healthcare professional. [place] [address] any warranty or liability for your use of this information. Please provide this summary of care documentation to your next provider. Signatures-by signing, you are acknowledging that this After Visit Summary has been reviewed with you and you have received a copy. Patient Signature:  ____________________________________________________________ Date:  ____________________________________________________________  
  
Phyliss Ghee Provider Signature:  ____________________________________________________________ Date:  ____________________________________________________________

## 2018-03-09 NOTE — Clinical Note
Status[de-identified] Inpatient [101] Type of Bed: Telemetry [19] Inpatient Hospitalization Certified Necessary for the Following Reasons: 9. Other (further clarification in H&P documentation) Admitting Diagnosis: Chest pain [095286] Admitting Physician: Bari Snow [[id number]] Attending Physician: Shad Lozada Estimated Length of Stay: 3-4 Midnights Discharge Plan[de-identified] Home with Office Follow-up

## 2018-03-09 NOTE — IP AVS SNAPSHOT
1111 [facility] [address] [place] [address] 
[phone] Patient: Anel Webb MRN: [id number] UJA:[birth year] A check dana indicates which time of day the medication should be taken. My Medications START taking these medications Instructions Each Dose to Equal  
 Morning Noon Evening Bedtime  
 potassium chloride SR 10 mEq tablet Commonly known as:  KLOR-CON 10 Start taking on:  3/14/2018 Your last dose was: Your next dose is: Take 3 Tabs by mouth daily. 30 mEq CONTINUE taking these medications Instructions Each Dose to Equal  
 Morning Noon Evening Bedtime  
 aspirin 81 mg chewable tablet Your last dose was: Your next dose is: Take 81 mg by mouth daily. 81 mg  
    
   
   
   
  
 atorvastatin 80 mg tablet Commonly known as:  LIPITOR Your last dose was: Your next dose is: Take 40 mg by mouth daily. 40 mg  
    
   
   
   
  
 bumetanide 1 mg tablet Commonly known as:  Marijane Scales Your last dose was: Your next dose is: Take 1 Tab by mouth two (2) times a day. 1 mg  
    
   
   
   
  
 cyanocobalamin 1,000 mcg tablet Your last dose was: Your next dose is: Take 1,000 mcg by mouth daily. 1000 mcg  
    
   
   
   
  
 finasteride 5 mg tablet Commonly known as:  PROSCAR Your last dose was: Your next dose is: Take 5 mg by mouth daily. 5 mg  
    
   
   
   
  
 losartan 25 mg tablet Commonly known as:  COZAAR Your last dose was: Your next dose is: Take 0.5 Tabs by mouth daily. 12.5 mg  
    
   
   
   
  
 metoprolol succinate 50 mg XL tablet Commonly known as:  TOPROL XL Your last dose was: Your next dose is: Take 1 Tab by mouth daily.   
 50 mg  
    
   
   
   
  
 MULTIVITAMIN AND MINERALS PO Your last dose was: Your next dose is: Take 1 Tab by mouth daily. 1 Tab  
    
   
   
   
  
 tamsulosin 0.4 mg capsule Commonly known as:  FLOMAX Your last dose was: Your next dose is: Take 0.4 mg by mouth daily. 0.4 mg Where to Get Your Medications Information on where to get these meds will be given to you by the nurse or doctor. ! Ask your nurse or doctor about these medications  
  potassium chloride SR 10 mEq tablet

## 2018-03-09 NOTE — IP AVS SNAPSHOT
Summary of Care Report The Summary of Care report has been created to help improve care coordination. Users with access to [employer] or [address] (Web-based application) may access additional patient information including the Discharge Summary. If you are not currently a [address] user and need more information, please call the number listed below in the [place] [address] section and ask to be connected with Medical Records. Facility Information Name Address Phone [phone] [place] [phone] Patient Information Patient Name Sex  Domonique Vazquez ([id number]) Male 1931 Discharge Information Admitting Provider Service Area Unit Oscar Hernandez MD /  [address] [facility] / [phone] Discharge Provider Discharge Date/Time Discharge Disposition Destination (none) 3/13/2018 (Pending) [facility] (none) Patient Language Language ENGLISH [13] Hospital Problems as of 3/13/2018  Reviewed: 3/9/2018 10:38 PM by Oscar Hernandez MD  
  
  
  
 Class Noted - Resolved Last Modified POA Active Problems * (Principal)SOB (shortness of breath)  2018 - Present 3/9/2018 by Oscar Hernandez MD Unknown Entered by Oscar Hernandez MD  
  Chest pain  3/9/2018 - Present 3/9/2018 by Oscar Hernandez MD Unknown Entered by Oscar Hernandez MD  
  Shortness of breath  3/13/2018 - Present 3/13/2018 by Oscar Hernandez MD Unknown Entered by Oscar Hernandez MD  
  
Non-Hospital Problems as of 3/13/2018  Reviewed: 3/9/2018 10:38 PM by Oscar Hernandez MD  
 None You are allergic to the following No active allergies Current Discharge Medication List  
  
START taking these medications Dose & Instructions Dispensing Information Comments  
 potassium chloride SR 10 mEq tablet Commonly known as:  KLOR-CON 10 Start taking on:  3/14/2018 Dose:  30 mEq Take 3 Tabs by mouth daily. Quantity:  30 Tab Refills:  0 CONTINUE these medications which have NOT CHANGED Dose & Instructions Dispensing Information Comments  
 aspirin 81 mg chewable tablet Dose:  81 mg Take 81 mg by mouth daily. Refills:  0  
   
 atorvastatin 80 mg tablet Commonly known as:  LIPITOR Dose:  40 mg Take 40 mg by mouth daily. Refills:  0  
   
 bumetanide 1 mg tablet Commonly known as:  Heron Samuels Dose:  1 mg Take 1 Tab by mouth two (2) times a day. Quantity:  60 Tab Refills:  1  
   
 cyanocobalamin 1,000 mcg tablet Dose:  1000 mcg Take 1,000 mcg by mouth daily. Refills:  0  
   
 finasteride 5 mg tablet Commonly known as:  PROSCAR Dose:  5 mg Take 5 mg by mouth daily. Refills:  0  
   
 losartan 25 mg tablet Commonly known as:  COZAAR Dose:  12.5 mg Take 0.5 Tabs by mouth daily. Quantity:  30 Tab Refills:  1  
   
 metoprolol succinate 50 mg XL tablet Commonly known as:  TOPROL XL Dose:  50 mg Take 1 Tab by mouth daily. Quantity:  30 Tab Refills:  1 MULTIVITAMIN AND MINERALS PO Dose:  1 Tab Take 1 Tab by mouth daily. Refills:  0  
   
 tamsulosin 0.4 mg capsule Commonly known as:  FLOMAX Dose:  0.4 mg Take 0.4 mg by mouth daily. Refills:  0 Current Immunizations Name Date Influenza Vaccine (Quad) PF 2/13/2018 Follow-up Information Follow up With Details Comments Contact Info Phys Olu, MD   Patient can only remember the practice name and not the physician [facility] PT, OT and SN/Please call this agency when you get home. 111 [facility], [address] [place] [address] 
[phone] Discharge Instructions Discharge Instructions PATIENT ID: Bonita Daniel MRN: [id number] YOB: 1931 DATE OF ADMISSION: 3/9/2018  8:31 PM   
DATE OF DISCHARGE: 3/13/2018 PRIMARY CARE PROVIDER: Crystal Raines MD  
 
ATTENDING PHYSICIAN: Gini Camarena MD 
DISCHARGING PROVIDER: Gini Camarena MD   
To contact this individual call [phone] and ask the  to page. If unavailable ask to be transferred the Adult Hospitalist Department. DISCHARGE DIAGNOSES Acute on chronic systolic heart failure, NYHA Class III (POA) Hypokalemia (POA): Hypomagnesia (POA): Atrial fibrillation: 
Recent diarrhea: 
Hx of BPH: 
CKD, stage III: 
 
CONSULTATIONS: IP CONSULT TO HOSPITALIST 
IP CONSULT TO CARDIOLOGY PROCEDURES/SURGERIES: * No surgery found * PENDING TEST RESULTS:  
At the time of discharge the following test results are still pending: none FOLLOW UP APPOINTMENTS:  
Follow up with Bárbara Smith MD on 3/16/2018 at 11:00 amd 
Follow up with your Primary Care Physician in 2-3 days Follow up with your Cardiologist at [place] in 2-3 days ADDITIONAL CARE RECOMMENDATIONS:  
 
DIET: Cardiac ACTIVITY: Activity as tolerated WOUND CARE: none EQUIPMENT needed: none DISCHARGE MEDICATIONS: 
 See Medication Reconciliation Form · It is important that you take the medication exactly as they are prescribed. · Keep your medication in the bottles provided by the pharmacist and keep a list of the medication names, dosages, and times to be taken in your wallet. · Do not take other medications without consulting your doctor. NOTIFY YOUR PHYSICIAN FOR ANY OF THE FOLLOWING:  
Fever over 101 degrees for 24 hours. Chest pain, shortness of breath, fever, chills, nausea, vomiting, diarrhea, change in mentation, falling, weakness, bleeding. Severe pain or pain not relieved by medications. Or, any other signs or symptoms that you may have questions about. DISPOSITION: 
x  Home With: 
x OT x PT x HH x RN  
  
 SNF/Inpatient Rehab/LTAC Independent/assisted living Hospice Other: CDMP Checked:  
Yes x PROBLEM LIST Updated: 
Yes x Signed:  
Chuy Meyer MD 
3/13/2018 
3:18 PM 
 
Chart Review Routing History Recipient Method Report Sent By Kristy Hurley RN Phone: [phone] In Basket Notes Report Eden Cheatham NP [[id number]] 2/14/2018 11:59 AM 2/13/2018

## 2018-03-10 LAB
ANION GAP SERPL CALC-SCNC: 13 MMOL/L (ref 5–15)
ATRIAL RATE: 33 BPM
ATRIAL RATE: 91 BPM
BASOPHILS # BLD: 0.1 K/UL (ref 0–0.1)
BASOPHILS NFR BLD: 1 % (ref 0–1)
BUN SERPL-MCNC: 20 MG/DL (ref 6–20)
BUN/CREAT SERPL: 15 (ref 12–20)
CALCIUM SERPL-MCNC: 7.8 MG/DL (ref 8.5–10.1)
CALCULATED R AXIS, ECG10: -13 DEGREES
CALCULATED R AXIS, ECG10: -29 DEGREES
CALCULATED T AXIS, ECG11: -20 DEGREES
CALCULATED T AXIS, ECG11: 94 DEGREES
CHLORIDE SERPL-SCNC: 111 MMOL/L (ref 97–108)
CO2 SERPL-SCNC: 18 MMOL/L (ref 21–32)
CREAT SERPL-MCNC: 1.3 MG/DL (ref 0.7–1.3)
DIAGNOSIS, 93000: NORMAL
DIAGNOSIS, 93000: NORMAL
DIFFERENTIAL METHOD BLD: ABNORMAL
EOSINOPHIL # BLD: 0 K/UL (ref 0–0.4)
EOSINOPHIL NFR BLD: 0 % (ref 0–7)
ERYTHROCYTE [DISTWIDTH] IN BLOOD BY AUTOMATED COUNT: 17.8 % (ref 11.5–14.5)
GLUCOSE SERPL-MCNC: 107 MG/DL (ref 65–100)
HCT VFR BLD AUTO: 29.8 % (ref 36.6–50.3)
HGB BLD-MCNC: 9.8 G/DL (ref 12.1–17)
IMM GRANULOCYTES # BLD: 0.1 K/UL (ref 0–0.04)
IMM GRANULOCYTES NFR BLD AUTO: 1 % (ref 0–0.5)
LYMPHOCYTES # BLD: 0.7 K/UL (ref 0.8–3.5)
LYMPHOCYTES NFR BLD: 11 % (ref 12–49)
MAGNESIUM SERPL-MCNC: 2.1 MG/DL (ref 1.6–2.4)
MCH RBC QN AUTO: 32.8 PG (ref 26–34)
MCHC RBC AUTO-ENTMCNC: 32.9 G/DL (ref 30–36.5)
MCV RBC AUTO: 99.7 FL (ref 80–99)
MONOCYTES # BLD: 0.5 K/UL (ref 0–1)
MONOCYTES NFR BLD: 8 % (ref 5–13)
NEUTS SEG # BLD: 5.1 K/UL (ref 1.8–8)
NEUTS SEG NFR BLD: 79 % (ref 32–75)
NRBC # BLD: 0.04 K/UL (ref 0–0.01)
NRBC BLD-RTO: 0.6 PER 100 WBC
PHOSPHATE SERPL-MCNC: 3 MG/DL (ref 2.6–4.7)
PLATELET # BLD AUTO: 140 K/UL (ref 150–400)
PLATELET COMMENTS,PCOM: ABNORMAL
PMV BLD AUTO: 12.7 FL (ref 8.9–12.9)
POTASSIUM SERPL-SCNC: 2.9 MMOL/L (ref 3.5–5.1)
POTASSIUM SERPL-SCNC: 3.4 MMOL/L (ref 3.5–5.1)
POTASSIUM SERPL-SCNC: 3.6 MMOL/L (ref 3.5–5.1)
Q-T INTERVAL, ECG07: 340 MS
Q-T INTERVAL, ECG07: 512 MS
QRS DURATION, ECG06: 80 MS
QRS DURATION, ECG06: 84 MS
QTC CALCULATION (BEZET), ECG08: 447 MS
QTC CALCULATION (BEZET), ECG08: 629 MS
RBC # BLD AUTO: 2.99 M/UL (ref 4.1–5.7)
RBC MORPH BLD: ABNORMAL
SODIUM SERPL-SCNC: 142 MMOL/L (ref 136–145)
TROPONIN I SERPL-MCNC: 0.05 NG/ML
TROPONIN I SERPL-MCNC: <0.04 NG/ML
TSH SERPL DL<=0.05 MIU/L-ACNC: 3.27 UIU/ML (ref 0.36–3.74)
VENTRICULAR RATE, ECG03: 104 BPM
VENTRICULAR RATE, ECG03: 91 BPM
WBC # BLD AUTO: 6.5 K/UL (ref 4.1–11.1)

## 2018-03-10 PROCEDURE — 96375 TX/PRO/DX INJ NEW DRUG ADDON: CPT

## 2018-03-10 PROCEDURE — G8988 SELF CARE GOAL STATUS: HCPCS

## 2018-03-10 PROCEDURE — 74011250636 HC RX REV CODE- 250/636: Performed by: FAMILY MEDICINE

## 2018-03-10 PROCEDURE — 96366 THER/PROPH/DIAG IV INF ADDON: CPT

## 2018-03-10 PROCEDURE — 65390000012 HC CONDITION CODE 44 OBSERVATION

## 2018-03-10 PROCEDURE — 83735 ASSAY OF MAGNESIUM: CPT | Performed by: INTERNAL MEDICINE

## 2018-03-10 PROCEDURE — 80048 BASIC METABOLIC PNL TOTAL CA: CPT | Performed by: FAMILY MEDICINE

## 2018-03-10 PROCEDURE — 96372 THER/PROPH/DIAG INJ SC/IM: CPT

## 2018-03-10 PROCEDURE — G8987 SELF CARE CURRENT STATUS: HCPCS

## 2018-03-10 PROCEDURE — 97535 SELF CARE MNGMENT TRAINING: CPT

## 2018-03-10 PROCEDURE — 65660000000 HC RM CCU STEPDOWN

## 2018-03-10 PROCEDURE — 84100 ASSAY OF PHOSPHORUS: CPT | Performed by: INTERNAL MEDICINE

## 2018-03-10 PROCEDURE — 77010033678 HC OXYGEN DAILY

## 2018-03-10 PROCEDURE — 97166 OT EVAL MOD COMPLEX 45 MIN: CPT

## 2018-03-10 PROCEDURE — 85025 COMPLETE CBC W/AUTO DIFF WBC: CPT | Performed by: FAMILY MEDICINE

## 2018-03-10 PROCEDURE — 36415 COLL VENOUS BLD VENIPUNCTURE: CPT | Performed by: NURSE PRACTITIONER

## 2018-03-10 PROCEDURE — 93005 ELECTROCARDIOGRAM TRACING: CPT

## 2018-03-10 PROCEDURE — 84132 ASSAY OF SERUM POTASSIUM: CPT | Performed by: NURSE PRACTITIONER

## 2018-03-10 PROCEDURE — 74011000250 HC RX REV CODE- 250: Performed by: FAMILY MEDICINE

## 2018-03-10 PROCEDURE — 96376 TX/PRO/DX INJ SAME DRUG ADON: CPT

## 2018-03-10 PROCEDURE — 74011250636 HC RX REV CODE- 250/636: Performed by: NURSE PRACTITIONER

## 2018-03-10 PROCEDURE — 84132 ASSAY OF SERUM POTASSIUM: CPT | Performed by: INTERNAL MEDICINE

## 2018-03-10 PROCEDURE — 84484 ASSAY OF TROPONIN QUANT: CPT | Performed by: FAMILY MEDICINE

## 2018-03-10 PROCEDURE — 74011250637 HC RX REV CODE- 250/637: Performed by: FAMILY MEDICINE

## 2018-03-10 RX ORDER — POTASSIUM CHLORIDE 14.9 MG/ML
10 INJECTION INTRAVENOUS
Status: COMPLETED | OUTPATIENT
Start: 2018-03-10 | End: 2018-03-12

## 2018-03-10 RX ORDER — MAGNESIUM SULFATE HEPTAHYDRATE 40 MG/ML
2 INJECTION, SOLUTION INTRAVENOUS ONCE
Status: COMPLETED | OUTPATIENT
Start: 2018-03-10 | End: 2018-03-12

## 2018-03-10 RX ORDER — POTASSIUM CHLORIDE 750 MG/1
20 TABLET, FILM COATED, EXTENDED RELEASE ORAL DAILY
Status: DISCONTINUED | OUTPATIENT
Start: 2018-03-11 | End: 2018-03-11

## 2018-03-10 RX ADMIN — FINASTERIDE 5 MG: 5 TABLET, FILM COATED ORAL at 08:29

## 2018-03-10 RX ADMIN — MULTIPLE VITAMINS W/ MINERALS TAB 1 TABLET: TAB at 08:25

## 2018-03-10 RX ADMIN — Medication 1000 MCG: at 08:24

## 2018-03-10 RX ADMIN — MAGNESIUM SULFATE HEPTAHYDRATE 2 G: 40 INJECTION, SOLUTION INTRAVENOUS at 00:13

## 2018-03-10 RX ADMIN — HEPARIN SODIUM 5000 UNITS: 5000 INJECTION, SOLUTION INTRAVENOUS; SUBCUTANEOUS at 00:12

## 2018-03-10 RX ADMIN — POTASSIUM CHLORIDE 10 MEQ: 200 INJECTION, SOLUTION INTRAVENOUS at 11:00

## 2018-03-10 RX ADMIN — Medication 10 ML: at 00:12

## 2018-03-10 RX ADMIN — POTASSIUM CHLORIDE 10 MEQ: 200 INJECTION, SOLUTION INTRAVENOUS at 08:23

## 2018-03-10 RX ADMIN — BUMETANIDE 1 MG: 0.25 INJECTION INTRAMUSCULAR; INTRAVENOUS at 17:05

## 2018-03-10 RX ADMIN — POTASSIUM CHLORIDE 10 MEQ: 200 INJECTION, SOLUTION INTRAVENOUS at 13:23

## 2018-03-10 RX ADMIN — MAGNESIUM SULFATE HEPTAHYDRATE 2 G: 40 INJECTION, SOLUTION INTRAVENOUS at 08:24

## 2018-03-10 RX ADMIN — TAMSULOSIN HYDROCHLORIDE 0.4 MG: 0.4 CAPSULE ORAL at 08:29

## 2018-03-10 RX ADMIN — HEPARIN SODIUM 5000 UNITS: 5000 INJECTION, SOLUTION INTRAVENOUS; SUBCUTANEOUS at 17:04

## 2018-03-10 RX ADMIN — METOPROLOL SUCCINATE 50 MG: 50 TABLET, EXTENDED RELEASE ORAL at 08:25

## 2018-03-10 RX ADMIN — Medication 10 ML: at 14:44

## 2018-03-10 RX ADMIN — HEPARIN SODIUM 5000 UNITS: 5000 INJECTION, SOLUTION INTRAVENOUS; SUBCUTANEOUS at 08:30

## 2018-03-10 RX ADMIN — ASPIRIN 81 MG 81 MG: 81 TABLET ORAL at 08:25

## 2018-03-10 RX ADMIN — LOSARTAN POTASSIUM 12.5 MG: 25 TABLET ORAL at 08:28

## 2018-03-10 RX ADMIN — ATORVASTATIN CALCIUM 40 MG: 40 TABLET, FILM COATED ORAL at 08:29

## 2018-03-10 RX ADMIN — BUMETANIDE 1 MG: 0.25 INJECTION INTRAMUSCULAR; INTRAVENOUS at 08:30

## 2018-03-10 RX ADMIN — POTASSIUM CHLORIDE 10 MEQ: 200 INJECTION, SOLUTION INTRAVENOUS at 11:13

## 2018-03-10 RX ADMIN — Medication 10 ML: at 05:33

## 2018-03-10 RX ADMIN — Medication 10 ML: at 21:50

## 2018-03-10 NOTE — ED NOTES
2130: Patient resting comfortably. Intermittently low 02 sats, placed on 2L NC with improvement. MD aware. 2330: Patient cleaned, turned, and repositioned in bed. Incontinent of large amount of urine, holly-care provided. Lights dimmed for comfort. 0034: Report attempted. 0100: Patient left department with RN on cardiac monitor for transportation to inpatient bed. Patient's VS at the time of transfer were /73, 94%  on 2L, denies pain at this time, 97.5 orally, HR 89 afib on the monitor. Patient was alert and oriented x 4 and denies further needs at time of transfer. TRANSFER - OUT REPORT:    Verbal report given to JENNIFER Richardson(name) on Mishel Glasgow  being transferred to [facility](unit) for routine progression of care       Report consisted of patients Situation, Background, Assessment and   Recommendations(SBAR). Information from the following report(s) SBAR, Kardex, ED Summary, [facility] and Recent Results was reviewed with the receiving nurse. Opportunity for questions and clarification was provided.

## 2018-03-10 NOTE — ED TRIAGE NOTES
Triage: Patient arrives via EMS from home for c/o chest pain and diarrhea x 1 week. Recent admission for CHF. Patient denies chest pain at this time, last felt CP and had diarrhea 2 hours ago. Hx afib, patient currently in afib, rate 88.

## 2018-03-10 NOTE — H&P
[address]  ACUTE CARE HISTORY AND PHYSICAL    Iris Ross  MR#: [id number]  : 1931  ACCOUNT #: [de-identified]   DATE OF SERVICE: 2018    CHIEF COMPLAINT:  Shortness of breath. HISTORY OF PRESENT ILLNESS:  Patient is an 68-year-old gentleman with past medical history of systolic CHF, NYHA class 3; history of AFib with RVR; history of chest pain; history of DVT; CKD, stage III; history of AAA; hypokalemia; transaminitis; CVA; concussion and BPH, who presents to the hospital complaining of the above-mentioned symptoms. Patient appears to be a poor historian, I suspect that he may have underlying dementia. Regardless, history was obtained from the patient and the wife. The wife reports that for the past four days, the patient has been getting progressively short of breath and has been complaining of some chest pain. The wife reports that the patient has been more short of breath, especially when he lies down, has not being able to exert himself, has not been able to walk without getting quite short of breath, and the wife got concerned and decided to bring the patient to the hospital.  She also reports that the patient had some \"stabbing chest pain like a bolt of lightening running through my chest\" sporadically. The patient denies any radiation to the chest pain. Denies any nausea, vomiting, palpitations, no diaphoresis with the chest pain. The patient also reports that he has been having some diarrhea off and on for the past few days. Denies any recent antibiotic use. Denies any fever, chills, nausea, vomiting or any other symptoms associated with the same.   The patient denies any headache, blurry vision, sore throat, trouble swallowing, trouble with speech, any abdominal pain, any nausea, vomiting, hematemesis, melena, hemoptysis, urinary symptoms, focal or generalized neurological weakness besides from baseline, recent travel, sick contacts, any injuries or any other concerns or problems. The wife reports the patient fell about a week back, but did not hurt himself anywhere as he fell on the carpet and \"gently slid down. \"  Patient denies any other complaints or problems. PAST MEDICAL HISTORY:  See above. MEDICATIONS:  Currently the patient is on Bumex 1 mg b.i.d., losartan 12.5 mg every day, metoprolol 50 mg every day, aspirin 81 mg daily, Lipitor 80 mg every day, cyanocobalamin, Proscar 5 mg every day, multivitamin, and Flomax 0.4 mg daily. SOCIAL HISTORY:  Former smoker. Denies alcohol use. Denies IV drug use. Lives at home. ALLERGIES:  NO KNOWN DRUG ALLERGIES. FAMILY HISTORY:  Was discussed. Father had history of coronary artery disease. REVIEW OF SYSTEMS:  All systems were reviewed and were found to be essentially negative except for the symptoms mentioned above. PHYSICAL EXAMINATION:  VITAL SIGNS:  Temperature 97.5, pulse 86, respiration rate 21, blood pressure 148/86, pulse ox 98% on 2 liters. On arrival, the patient had a heart rate of 102. GENERAL:  Alert x2, awake, no acute distress, resting in bed, pleasant male, appears to be stated age. HEENT:  Pupils equally reactive to light. Dry mucus membranes. Tympanic membranes clear. NECK:  Supple. CHEST:  Decreased basal breath sounds with scattered crackles. HEART:  S1, S2 heard. ABDOMEN:  Soft, nontender, nondistended. Bowel sounds are physiological.  EXTREMITIES:  No clubbing, no cyanosis. Trace edema, right greater than left. NEUROPSYCHIATRIC:  Pleasant mood and affect. Cranial nerves II-XII grossly intact. No focal neurological deficits were noted. SKIN:  Warm. LABORATORY DATA:  White count 7.1, hemoglobin 11.1, hematocrit 34, platelets  . Sodium 143, potassium 3.1, chloride 110, bicarbonate 21, anion gap 12, glucose 102, BUN 22, creatinine 1.53, calcium 8.0, magnesium 1.3, bilirubin total 0.9, ALT 22, AST 31, alkaline phosphatase 147. , troponin 0.06.   BNP 46,439. X-ray shows trace bilateral effusions. ASSESSMENT AND PLAN:  1. CHF exacerbation. The patient appears to be in mild congestive heart failure exacerbation. We will start patient on IV diuresis with Bumex IV. We will measure strict I's and O's, daily weight. Continue aspirin. Continue statin. Cardiology consult has been requested. Further intervention will be per hospital course. We will cycle troponins. The patient recently had echocardiogram.  We will await Cardiology input and further intervention will be per hospital course. Continue to monitor. 2.  Chest pain, rule out acute coronary syndrome. Continue aspirin and statin. Cycle troponins, telemetry monitoring, pain control, nitroglycerin and further intervention will be per hospital course. Cardiology consult has been requested. We will hold off on the echo. I will discuss with Cardiology and continue to monitor. 3.  Hypomagnesemia. Replace magnesium. Continue to monitor on tele. 4.  Hypokalemia. Potassium has been replaced. 5.  CKD, stage IIIA, stable. Continue to monitor closely in light of diuresis. 6.  Diarrhea, unclear etiology. No history of antibiotics. No risk for C. diff. No fever, no chills, no abdominal pain. I will send stool for culture and continue to monitor. Reassess as needed. Supportive care for now. 7.  History of atrial fibrillation with rapid ventricular response. Continue home medication. Currently, rate controlled. Continue to monitor. 8.  History of AAA. Monitor. 9.  History of cerebrovascular accident. Continue aspirin and statin. 10.  GI and DVT prophylaxis. The patient is on heparin.       Kandace Yoo MD MM / MN  D: 03/09/2018 22:36     T: 03/09/2018 23:22  JOB #: [id number]

## 2018-03-10 NOTE — PROCEDURES
[facility]  *** FINAL REPORT ***    Name: Wayne Garcia  MRN: [id number]    Inpatient  : 1931  HIS Order #: [id number]  [address] [facility] Visit #: [id number]  Date: 09 Mar 2018    TYPE OF TEST: Peripheral Venous Testing    REASON FOR TEST  Limb swelling    Right Leg:-  Deep venous thrombosis:           No  Superficial venous thrombosis:    No  Deep venous insufficiency:        Not examined  Superficial venous insufficiency: Not examined      INTERPRETATION/FINDINGS  PROCEDURE:  Color duplex ultrasound imaging of lower extremity veins. FINDINGS:       Right: The common femoral, deep femoral, femoral, popliteal,  posterior tibial, peroneal, and great saphenous are patent and without   evidence of thrombus; each is is fully compressible and there is no  narrowing of the flow channel on color Doppler imaging. Phasic flow  is observed in the common femoral vein. Left:   The common femoral vein is patent and without evidence of   thrombus. Phasic flow is observed. This extremity was not otherwise   evaluated. IMPRESSION:  No evidence of right lower extremity vein thrombosis. ADDITIONAL COMMENTS    I have personally reviewed the data relevant to the interpretation of  this  study.     TECHNOLOGIST: Tracey Bourne RVT  Signed: 2018 11:22 PM    PHYSICIAN: Uday Gamboa MD  Signed: 2018 11:36 AM

## 2018-03-10 NOTE — PROGRESS NOTES
Problem: Self Care Deficits Care Plan (Adult)  Goal: *Acute Goals and Plan of Care (Insert Text)  Occupational Therapy Goals  Initiated 3/10/2018  1. Patient will perform lower body dressing with modified independence within 7 day(s). 2.  Patient will perform standing ADLs 5 mins with modified independence within 7 day(s). 3.  Patient will perform toilet transfers with modified independence within 7 day(s). 4.  Patient will perform all aspects of toileting with modified independence within 7 day(s). Occupational Therapy EVALUATION  Patient: Pauline Mendoza (14 y.o. male)  Date: 3/10/2018  Primary Diagnosis: Chest pain  SOB (shortness of breath)        Precautions:        ASSESSMENT :  Based on the objective data described below, the patient presents with independent to moderate A ADLs, bed mobility modified independence, to chair with supervision and standing tolerance 3 mins. ADLs limited by overall endurance, no room to move in the room so unable to complete ADLs in the bathroom at this time. Seems that patient is close to baseline ADLs. Will continue to follow to make sure though. Patient will benefit from skilled intervention to address the above impairments.   Patients rehabilitation potential is considered to be Fair  Factors which may influence rehabilitation potential include:   []             None noted  [x]             Mental ability/status agitated dementia in which wife corrects him and increases patient agitation  []             Medical condition  []             Home/family situation and support systems  []             Safety awareness  []             Pain tolerance/management  []             Other:      PLAN :  Recommendations and Planned Interventions:  [x]               Self Care Training                  [x]        Therapeutic Activities  [x]               Functional Mobility Training    []        Cognitive Retraining  [x]               Therapeutic Exercises           [x] Endurance Activities  [x]               Balance Training                   []        Neuromuscular Re-Education  []               Visual/Perceptual Training     [x]   Home Safety Training  [x]               Patient Education                 [x]        Family Training/Education  []               Other (comment):    Frequency/Duration: Patient will be followed by occupational therapy 3 times a week to address goals. Discharge Recommendations: Home Health  Further Equipment Recommendations for Discharge: none noted     SUBJECTIVE:   Patient stated This is all different! I get off on the left side at home (the wife corrected him 6 times that he gets off on the right side and each time he was corrected he was more adamant that he got off on the L side, and so on).     OBJECTIVE DATA SUMMARY:   HISTORY:   Past Medical History:   Diagnosis Date    Arthritis     Heart failure ([facility] [address]     Hypertension      Past Surgical History:   Procedure Laterality Date    HX PROSTATECTOMY         Prior Level of Function/Environment/Context: home health OT and PT following patient in which patient completing standing exercises with them, RW throughout the house, standing to dress and bath. Wife independent.    Occupations in which the patient is/was successful, what are the barriers preventing that success:   Performance Patterns (routines, roles, habits, and rituals):   Personal Interests and/or values:   Expanded or extensive additional review of patient history:     Home Situation  Home Environment: Independent living  # Steps to Enter: 5  One/Two Story Residence: One story  Living Alone: No  Support Systems: Family member(s)  Patient Expects to be Discharged to[de-identified] Private residence  Current DME Used/Available at Home: Walker  Tub or Shower Type: Tub/Shower combination  [x]  Right hand dominant   []  Left hand dominant    EXAMINATION OF PERFORMANCE DEFICITS:  Cognitive/Behavioral Status:  Neurologic State: Alert  Orientation Level: Oriented to person;Oriented to place; Disoriented to situation;Disoriented to time  Cognition: Follows commands; Impaired decision making;Poor safety awareness  Perception: Appears intact  Perseveration: Perseverates during conversation  Safety/Judgement: Decreased insight into deficits; Decreased awareness of need for safety;Decreased awareness of need for assistance;Decreased awareness of environment    Skin: intact B PIVs    Edema: intact    Hearing: Auditory  Auditory Impairment: Hard of hearing, left side    Vision/Perceptual:                           Acuity: Impaired near vision; Impaired far vision    Corrective Lenses: Glasses    Range of Motion:  \  AROM: Within functional limits                         Strength:    Strength: Within functional limits                Coordination:  Coordination: Within functional limits  Fine Motor Skills-Upper: Left Intact; Right Intact    Gross Motor Skills-Upper: Left Intact; Right Intact    Tone & Sensation:    Tone: Normal  Sensation: Intact                      Balance:  Sitting: Intact; Without support  Standing: Impaired; Without support (one hand on supportive surface)  Standing - Static: Fair  Standing - Dynamic : Fair    Functional Mobility and Transfers for ADLs:  Bed Mobility:  Rolling: Modified independent  Supine to Sit: Modified independent (exit R side as does at home (wife confirmed))  Scooting: Modified independent (to EOB and in chair)    Transfers:  Sit to Stand: Supervision  Stand to Sit: Supervision  Bed to Chair: Supervision  Toilet Transfer : Supervision (infer from chair if [facility] present)  Tub Transfer: Total assistance (not safe at this time)    ADL Assessment:  Feeding: Independent    Oral Facial Hygiene/Grooming: Supervision setup on beside tray via report that he completed    Bathing: Moderate assistance infer from standing balance and functional reach to shins only    Upper Body Dressing: Supervision setup    Lower Body Dressing:  Moderate assistance functional reach to shins, fair standing balance; total A socks    Toileting: Minimum assistance (brief A to doff and don; no A hygiene; RW) urinal use, wetness noted on bed and full brief                ADL Intervention and task modifications:                                     Cognitive Retraining  Safety/Judgement: Decreased insight into deficits; Decreased awareness of need for safety;Decreased awareness of need for assistance;Decreased awareness of environment    Therapeutic Exercise:  Patient demonstrated shoulder flexion 10 reps 1 set v. [place] with supervision \"this is what I do with the therapists, I already did them today but I will do them again\". Wife shaking head that he had not completed them nor therapy today. Functional Measure:  Barthel Index:    Bathin  Bladder: 5  Bowels: 10  Groomin  Dressin  Feeding: 10  Mobility: 5  Stairs: 0  Toilet Use: 5  Transfer (Bed to Chair and Back): 10  Total: 55       Barthel and G-code impairment scale:  Percentage of impairment CH  0% CI  1-19% CJ  20-39% CK  40-59% CL  60-79% CM  80-99% CN  100%   Barthel Score 0-100 100 99-80 79-60 59-40 20-39 1-19   0   Barthel Score 0-20 20 17-19 13-16 9-12 5-8 1-4 0      The Barthel ADL Index: Guidelines  1. The index should be used as a record of what a patient does, not as a record of what a patient could do. 2. The main aim is to establish degree of independence from any help, physical or verbal, however minor and for whatever reason. 3. The need for supervision renders the patient not independent. 4. A patient's performance should be established using the best available evidence. Asking the patient, friends/relatives and nurses are the usual sources, but direct observation and common sense are also important. However direct testing is not needed. 5. Usually the patient's performance over the preceding 24-48 hours is important, but occasionally longer periods will be relevant.   6. Middle categories imply that the patient supplies over 50 per cent of the effort. 7. Use of aids to be independent is allowed. Rubbie Huge., Barthel, D.W. ([id number]). Functional evaluation: the Barthel Index. [address] [facility] (14)2. Carolina Bitter demetri [place], J.J.M.F, Catrachita Valle., Qamar Pearce., [place], [facility] (1999). Measuring the change indisability after inpatient rehabilitation; comparison of the responsiveness of the Barthel Index and Functional [place] Measure. Journal of Neurology, Neurosurgery, and Psychiatry, 66(4), 769-423. Brock Reyes, N.J.A, HERIBERTO Turcios, & Curtis Bryan MSAROJ. (2004.) Assessment of post-stroke quality of life in cost-effectiveness studies: The usefulness of the Barthel Index and the EuroQoL-5D. Quality of Life Research, 13, 579-64         G codes: In compliance with CMSs Claims Based Outcome Reporting, the following G-code set was chosen for this patient based on their primary functional limitation being treated: The outcome measure chosen to determine the severity of the functional limitation was the Barthel Index with a score of 55/100 which was correlated with the impairment scale. ? Self Care:     - CURRENT STATUS: CK - 40%-59% impaired, limited or restricted    - GOAL STATUS: CI - 1%-19% impaired, limited or restricted    - D/C STATUS:  ---------------To be determined---------------     Pain:  Pain Scale 1: Numeric (0 - 10)  Pain Intensity 1: 0              Activity Tolerance:   VSS throughout, 2L NC  Please refer to the flowsheet for vital signs taken during this treatment. After treatment:   [x] Patient left in no apparent distress sitting up in chair  [] Patient left in no apparent distress in bed  [x] Call bell left within reach  [x] Nursing notified  [x] Caregiver present  [] Bed alarm activated    COMMUNICATION/EDUCATION:   The patients plan of care was discussed with: Registered Nurse.   [x] Home safety education was provided and the patient/caregiver indicated understanding. [x] Patient/family have participated as able in goal setting and plan of care. [x] Patient/family agree to work toward stated goals and plan of care. [] Patient understands intent and goals of therapy, but is neutral about his/her participation. [] Patient is unable to participate in goal setting and plan of care. This patients plan of care is appropriate for delegation to \[facility]\"".     Thank you for this referral.  Yolis Quintero  Time Calculation: 28 mins

## 2018-03-10 NOTE — PROGRESS NOTES
Admission Medication Reconciliation:    Medication review done with patient's wife and medication list.  She states there is a medication which might not be on the medication list.   RX query shows recent fill of Hydralazine. Wife does not remember or recognize for certain the name. Allergies reviewed. He has taken his medications today. Significant PMH/Disease States:   Past Medical History:   Diagnosis Date    Arthritis     Heart failure ([address]     Hypertension        Chief Complaint for this Admission:  shortness of breath, diarrhea    Allergies:  Review of patient's allergies indicates no known allergies. Prior to Admission Medications:   Prior to Admission Medications   Prescriptions Last Dose Informant Patient Reported? Taking? MULTIVIT WITH IRON,MINERALS (MULTIVITAMIN AND MINERALS PO) 3/9/2018 at Unknown time  Yes Yes   Sig: Take 1 Tab by mouth daily. aspirin 81 mg chewable tablet 3/9/2018 at Unknown time  Yes Yes   Sig: Take 81 mg by mouth daily. atorvastatin (LIPITOR) 80 mg tablet 3/9/2018 at Unknown time  Yes Yes   Sig: Take 40 mg by mouth daily. bumetanide (BUMEX) 1 mg tablet 3/9/2018 at Unknown time  No Yes   Sig: Take 1 Tab by mouth two (2) times a day. cyanocobalamin 1,000 mcg tablet 3/9/2018 at Unknown time  Yes Yes   Sig: Take 1,000 mcg by mouth daily. finasteride (PROSCAR) 5 mg tablet 3/9/2018 at Unknown time  Yes Yes   Sig: Take 5 mg by mouth daily. losartan (COZAAR) 25 mg tablet 3/9/2018 at Unknown time  No Yes   Sig: Take 0.5 Tabs by mouth daily. metoprolol succinate (TOPROL-XL) 50 mg XL tablet 3/9/2018 at Unknown time  No Yes   Sig: Take 1 Tab by mouth daily. tamsulosin (FLOMAX) 0.4 mg capsule 3/9/2018 at Unknown time  Yes Yes   Sig: Take 0.4 mg by mouth daily.       Facility-Administered Medications: None     Comments/Recommendations: Medication review done with patient's wife and medication list.  She states there is a medication which might not be on the medication list.   RX query shows recent fill of Hydralazine. Wife does not remember or recognize for certain the name. Allergies reviewed. He has taken his medications today.

## 2018-03-10 NOTE — ED PROVIDER NOTES
HPI Comments: 80 y.o. male with past medical history significant for HF, HTN, and arthritis who presents from home via EMS with chief complaint of SOB. EMS reports intermittent SOB for 4 days accompanied by stabbing chest pain for the last 3-4 hours and \"severe\" diarrhea. Pt states his SOB is worsened by exertion. Per EMS, 12-lead showed A-fib w/ PVC's. He is on diuretics and denies missed doses. Per EMS, Pt was admitted 1 month ago for HF. Pt notes he walks with a walker. Pt denies fever, chills, and urine/bowel changes. There are no other acute medical concerns at this time. Note written by Rissa Malave, as dictated by Jennifer Perdue MD 8:40 PM    The history is provided by the patient. Past Medical History:   Diagnosis Date    Arthritis     Heart failure ([facility] [address]     Hypertension        Past Surgical History:   Procedure Laterality Date    HX PROSTATECTOMY           History reviewed. No pertinent family history. Social History     Social History    Marital status:      Spouse name: N/A    Number of children: N/A    Years of education: N/A     Occupational History    Not on file. Social History Main Topics    Smoking status: Former Smoker    Smokeless tobacco: Never Used    Alcohol use No    Drug use: No    Sexual activity: Not on file     Other Topics Concern    Not on file     Social History Narrative         ALLERGIES: Review of patient's allergies indicates no known allergies. Review of Systems   Constitutional: Negative for chills and fever. HENT: Negative for rhinorrhea and sore throat. Respiratory: Positive for shortness of breath. Cardiovascular: Positive for chest pain. Gastrointestinal: Positive for diarrhea. Negative for abdominal pain and vomiting. Genitourinary: Negative for dysuria and hematuria. Musculoskeletal: Negative for arthralgias and myalgias. Skin: Negative for pallor and rash.    Neurological: Negative for dizziness, weakness and light-headedness. All other systems reviewed and are negative. There were no vitals filed for this visit. Physical Exam   Vital signs reviewed. Nursing notes reviewed. Const:  No acute distress, well developed, well nourished  Head:  Atraumatic, normocephalic  Eyes:  PERRL, conjunctiva normal, no scleral icterus  Neck:  Supple, trachea midline  Cardiovascular:  RRR, no murmurs, no gallops, no rubs  Resp:  No resp distress, no increased work of breathing, no wheezes, no rhonchi, no rales,  Abd:  Soft, non-tender, non-distended, no rebound, no guarding, no CVA tenderness  :  Deferred  MSK:  No pedal edema, normal ROM  Neuro:  Alert and oriented x3, no cranial nerve defect  Skin:  Warm, dry, intact  Psych: normal mood and affect, behavior is normal, judgement and thought content is normal  Note written by Rissa Tran, as dictated by Norma Rocha MD 8:40 PM  MDM  Number of Diagnoses or Management Options  Hypoxia:   SOB (shortness of breath):      Amount and/or Complexity of Data Reviewed  Clinical lab tests: ordered and reviewed  Tests in the radiology section of CPT®: ordered and reviewed  Review and summarize past medical records: yes    Patient Progress  Patient progress: stable      ED Course     Pt. Presents to the ER with SOB. Pt. Is well appearing at rest, however, he is hypoxic to the upper 80s at rest.  Pt. With small effusions on xray. Pt. To be evaluated for admission by the hospitalist.    Procedures    PROGRESS NOTE:  9:40 PM  Pt's oxygen saturation is in the 80's at rest.       EKG interpretation: (Preliminary)  Rhythm: tachycardic; and irregular with multifocal PVCs. Rate (approx.): 104; Axis: normal; QRS interval: normal ; ST/T wave: non-specific changes; Other findings: abnormal ekg.

## 2018-03-10 NOTE — PROGRESS NOTES
Bedside shift change report given to Percilla Dancer, RN (oncoming nurse) by Nate Liang RN (offgoing nurse). Report included the following information SBAR, Kardex, ED Summary, Procedure Summary, Intake/Output, MAR, Accordion, Recent Results, Med Rec Status, Cardiac Rhythm NSR and Alarm Parameters .

## 2018-03-10 NOTE — PROGRESS NOTES
Problem: Falls - Risk of  Goal: *Absence of Falls  Document [place] Fall Risk and appropriate interventions in the flowsheet.   Outcome: Progressing Towards Goal  Fall Risk Interventions:            Medication Interventions: Assess postural VS orthostatic hypotension, Bed/chair exit alarm, Evaluate medications/consider consulting pharmacy, Patient to call before getting OOB, Teach patient to arise slowly

## 2018-03-10 NOTE — PROGRESS NOTES
Problem: Falls - Risk of  Goal: *Absence of Falls  Document [place] Fall Risk and appropriate interventions in the flowsheet.    Outcome: Progressing Towards Goal  Fall Risk Interventions:  Mobility Interventions: Communicate number of staff needed for ambulation/transfer, OT consult for ADLs, Patient to call before getting OOB, PT Consult for mobility concerns, PT Consult for assist device competence    Mentation Interventions: Door open when patient unattended, Increase mobility, More frequent rounding, Reorient patient, Room close to nurse's station    Medication Interventions: Assess postural VS orthostatic hypotension

## 2018-03-10 NOTE — CONSULTS
Date of  Admission: 3/9/2018  8:31 PM     Elvis Rivas is a 80 y.o. male admitted for Chest pain;SOB (shortness of breath)  Subjective: patient presents with SOB ( past medical history of systolic CHF, NYHA class 3; history of AFib with RVR declined OAC in the past; history of chest pain; history of DVT; CKD, stage III; history of AAA; hypokalemia; transaminitis; CVA; concussion and BPH  Patient appears to be a poor historian  His wife also tells me that he has had an unrelenting cough    . Assessment/Plan:CMP: severe NYHA class3-4  suspect decompensation of CHF. Continue toprol and losartan and start iv diuresis  cxr noted probnp elevated  Troponin ok  Follow creatinine strict I/o  Continue bumex iv bid  Last EF 2/18 of 20% with severe MR  ECG with AF nstt and PVCs  AF: persistent declined OAC in the past on asa, rate controlled for now on toprol    Patient Active Problem List    Diagnosis Date Noted    Chest pain 03/09/2018    SOB (shortness of breath) 02/08/2018      Crystal Raines MD  Past Medical History:   Diagnosis Date    Arthritis     Heart failure ([facility] [address]     Hypertension       Past Surgical History:   Procedure Laterality Date    HX PROSTATECTOMY       No Known Allergies   History reviewed. No pertinent family history.    Current Facility-Administered Medications   Medication Dose Route Frequency    potassium chloride 10 mEq in 50 ml IVPB  10 mEq IntraVENous Q1H    aspirin chewable tablet 81 mg  81 mg Oral DAILY    atorvastatin (LIPITOR) tablet 40 mg  40 mg Oral DAILY    cyanocobalamin (VITAMIN B12) tablet 1,000 mcg  1,000 mcg Oral DAILY    finasteride (PROSCAR) tablet 5 mg  5 mg Oral DAILY    losartan (COZAAR) tablet 12.5 mg  12.5 mg Oral DAILY    metoprolol succinate (TOPROL-XL) XL tablet 50 mg  50 mg Oral DAILY    multivitamin, tx-iron-ca-min (THERA-M w/ IRON) tablet 1 Tab  1 Tab Oral DAILY    tamsulosin (FLOMAX) capsule 0.4 mg  0.4 mg Oral DAILY    sodium chloride (NS) flush 5-10 mL  5-10 mL IntraVENous Q8H    sodium chloride (NS) flush 5-10 mL  5-10 mL IntraVENous PRN    nitroglycerin (NITROSTAT) tablet 0.4 mg  0.4 mg SubLINGual Q5MIN PRN    heparin (porcine) injection 5,000 Units  5,000 Units SubCUTAneous Q8H    bumetanide (BUMEX) injection 1 mg  1 mg IntraVENous BID         Review of Symptoms:  Pertinent items are noted in HPI. Physical Exam    Visit Vitals    BP (!) 151/104    Pulse 90    Temp 97.3 °F (36.3 °C)    Resp 22    Ht 5' 9\" (1.753 m)    Wt 78.6 kg (173 lb 4.8 oz)    SpO2 91%    BMI 25.59 kg/m2     Neck: no JVD  Heart: irregularly irregular rhythm  Lungs: diminished breath sounds R base, L base  Abdomen: soft, non-tender.  Bowel sounds normal. No masses,  no organomegaly  Extremities: no edema    Cardiographics    Telemetry: AFIB  ECG: nonspecific ST and T waves changes, atrial fibrillation  Echocardiogram: Not done    Recent radiology, intake/output and wt reviewed    Labs:   Recent Results (from the past 24 hour(s))   EKG, 12 LEAD, INITIAL    Collection Time: 03/09/18  8:40 PM   Result Value Ref Range    Ventricular Rate 104 BPM    Atrial Rate 33 BPM    QRS Duration 84 ms    Q-T Interval 340 ms    QTC Calculation (Bezet) 447 ms    Calculated R Axis -13 degrees    Calculated T Axis -20 degrees    Diagnosis       Undetermined rhythm  Nonspecific ST and T wave abnormality  When compared with ECG of 08-FEB-2018 11:39,  Current undetermined rhythm precludes rhythm comparison, needs review  Nonspecific T wave abnormality has replaced inverted T waves in Anterolateral   leads     CBC WITH AUTOMATED DIFF    Collection Time: 03/09/18  8:46 PM   Result Value Ref Range    WBC 7.1 4.1 - 11.1 K/uL    RBC 3.43 (L) 4.10 - 5.70 M/uL    HGB 11.1 (L) 12.1 - 17.0 g/dL    HCT 34.0 (L) 36.6 - 50.3 %    MCV 99.1 (H) 80.0 - 99.0 FL    MCH 32.4 26.0 - 34.0 PG    MCHC 32.6 30.0 - 36.5 g/dL    RDW 18.2 (H) 11.5 - 14.5 %    PLATELET 416 (L) 553 - 400 K/uL    MPV 12.8 8.9 - 12.9 FL NRBC 0.7 (H) 0  WBC    ABSOLUTE NRBC 0.05 (H) 0.00 - 0.01 K/uL    NEUTROPHILS 69 32 - 75 %    LYMPHOCYTES 18 12 - 49 %    MONOCYTES 10 5 - 13 %    EOSINOPHILS 2 0 - 7 %    BASOPHILS 1 0 - 1 %    IMMATURE GRANULOCYTES 0 0.0 - 0.5 %    ABS. NEUTROPHILS 4.9 1.8 - 8.0 K/UL    ABS. LYMPHOCYTES 1.3 0.8 - 3.5 K/UL    ABS. MONOCYTES 0.7 0.0 - 1.0 K/UL    ABS. EOSINOPHILS 0.1 0.0 - 0.4 K/UL    ABS. BASOPHILS 0.1 0.0 - 0.1 K/UL    ABS. IMM. GRANS. 0.0 0.00 - 0.04 K/UL    DF SMEAR SCANNED      RBC COMMENTS ANISOCYTOSIS  1+        RBC COMMENTS KINSEY CELLS  PRESENT        RBC COMMENTS MACROCYTOSIS  1+       METABOLIC PANEL, COMPREHENSIVE    Collection Time: 03/09/18  8:46 PM   Result Value Ref Range    Sodium 143 136 - 145 mmol/L    Potassium 3.1 (L) 3.5 - 5.1 mmol/L    Chloride 110 (H) 97 - 108 mmol/L    CO2 21 21 - 32 mmol/L    Anion gap 12 5 - 15 mmol/L    Glucose 102 (H) 65 - 100 mg/dL    BUN 22 (H) 6 - 20 MG/DL    Creatinine 1.53 (H) 0.70 - 1.30 MG/DL    BUN/Creatinine ratio 14 12 - 20      GFR est AA 53 (L) >60 ml/min/1.73m2    GFR est non-AA 43 (L) >60 ml/min/1.73m2    Calcium 8.0 (L) 8.5 - 10.1 MG/DL    Bilirubin, total 0.9 0.2 - 1.0 MG/DL    ALT (SGPT) 22 12 - 78 U/L    AST (SGOT) 31 15 - 37 U/L    Alk.  phosphatase 147 (H) 45 - 117 U/L    Protein, total 8.3 (H) 6.4 - 8.2 g/dL    Albumin 3.2 (L) 3.5 - 5.0 g/dL    Globulin 5.1 (H) 2.0 - 4.0 g/dL    A-G Ratio 0.6 (L) 1.1 - 2.2     CK W/ REFLX CKMB    Collection Time: 03/09/18  8:46 PM   Result Value Ref Range     39 - 308 U/L   TROPONIN I    Collection Time: 03/09/18  8:46 PM   Result Value Ref Range    Troponin-I, Qt. 0.06 (H) <0.05 ng/mL   NT-PRO BNP    Collection Time: 03/09/18  8:46 PM   Result Value Ref Range    NT pro-BNP 11226 (H) 0 - 450 PG/ML   MAGNESIUM    Collection Time: 03/09/18  8:46 PM   Result Value Ref Range    Magnesium 1.3 (L) 1.6 - 2.4 mg/dL   TSH 3RD GENERATION    Collection Time: 03/09/18  9:45 PM   Result Value Ref Range    TSH 3.27 0.36 - 3.74 uIU/mL   TROPONIN I    Collection Time: 03/10/18 12:20 AM   Result Value Ref Range    Troponin-I, Qt. 0.05 (H) <5.94 ng/mL   METABOLIC PANEL, BASIC    Collection Time: 03/10/18  5:32 AM   Result Value Ref Range    Sodium 142 136 - 145 mmol/L    Potassium 2.9 (L) 3.5 - 5.1 mmol/L    Chloride 111 (H) 97 - 108 mmol/L    CO2 18 (L) 21 - 32 mmol/L    Anion gap 13 5 - 15 mmol/L    Glucose 107 (H) 65 - 100 mg/dL    BUN 20 6 - 20 MG/DL    Creatinine 1.30 0.70 - 1.30 MG/DL    BUN/Creatinine ratio 15 12 - 20      GFR est AA >60 >60 ml/min/1.73m2    GFR est non-AA 52 (L) >60 ml/min/1.73m2    Calcium 7.8 (L) 8.5 - 10.1 MG/DL   CBC WITH AUTOMATED DIFF    Collection Time: 03/10/18  5:32 AM   Result Value Ref Range    WBC 6.5 4.1 - 11.1 K/uL    RBC 2.99 (L) 4.10 - 5.70 M/uL    HGB 9.8 (L) 12.1 - 17.0 g/dL    HCT 29.8 (L) 36.6 - 50.3 %    MCV 99.7 (H) 80.0 - 99.0 FL    MCH 32.8 26.0 - 34.0 PG    MCHC 32.9 30.0 - 36.5 g/dL    RDW 17.8 (H) 11.5 - 14.5 %    PLATELET 731 (L) 970 - 400 K/uL    MPV 12.7 8.9 - 12.9 FL    NRBC 0.6 (H) 0  WBC    ABSOLUTE NRBC 0.04 (H) 0.00 - 0.01 K/uL    NEUTROPHILS 79 (H) 32 - 75 %    LYMPHOCYTES 11 (L) 12 - 49 %    MONOCYTES 8 5 - 13 %    EOSINOPHILS 0 0 - 7 %    BASOPHILS 1 0 - 1 %    IMMATURE GRANULOCYTES 1 (H) 0.0 - 0.5 %    ABS. NEUTROPHILS 5.1 1.8 - 8.0 K/UL    ABS. LYMPHOCYTES 0.7 (L) 0.8 - 3.5 K/UL    ABS. MONOCYTES 0.5 0.0 - 1.0 K/UL    ABS. EOSINOPHILS 0.0 0.0 - 0.4 K/UL    ABS. BASOPHILS 0.1 0.0 - 0.1 K/UL    ABS. IMM.  GRANS. 0.1 (H) 0.00 - 0.04 K/UL    DF SMEAR SCANNED      PLATELET COMMENTS Large Platelets      RBC COMMENTS ANISOCYTOSIS  1+        RBC COMMENTS POLYCHROMASIA  1+        RBC COMMENTS KINSEY CELLS  PRESENT       TROPONIN I    Collection Time: 03/10/18  5:32 AM   Result Value Ref Range    Troponin-I, Qt. <0.04 <0.05 ng/mL   EKG, 12 LEAD, INITIAL    Collection Time: 03/10/18  5:44 AM   Result Value Ref Range    Ventricular Rate 91 BPM    Atrial Rate 91 BPM    QRS Duration 80 ms    Q-T Interval 512 ms    QTC Calculation (Bezet) 629 ms    Calculated R Axis -29 degrees    Calculated T Axis 94 degrees    Diagnosis       Undetermined rhythm  Low voltage QRS  Nonspecific ST and T wave abnormality  Prolonged QT  When compared with ECG of 09-MAR-2018 20:40,  MANUAL COMPARISON REQUIRED, DATA IS UNCONFIRMED

## 2018-03-10 NOTE — PROGRESS NOTES
Primary Nurse Nat Hubbard and Ramya Goel, RN performed a dual skin assessment on this patient No impairment noted  Ryan score is 19    Noted 1+ edema to both legs no pitting, swollen scrotums

## 2018-03-10 NOTE — PROGRESS NOTES
Hospitalist Progress Note  Ila Libman, NP  Answering service: [phone] from in house phone  Cell: [phone]      Date of Service:  3/10/2018  NAME:  Mishel Glasgow  :  1931  MRN:  [id number]      Admission Summary:   Mr. Alvarado Alarcon is a 79 yo male with PMH significant for chronic systolic heart failure, NYHA Class III - IV, atrial fibrillation, DVT, CKD stage 3, AA, transaminitis, CVA, recent concussion, and BPH. Presented to the ED on 3/9 due to CP, inability to lie flat, progressive SOB and cough. He was recently admitted last month for similar event. He is followed by Dr. Twana Cranker and ACP has been in progress. Interval history / Subjective: In good spirits and has no real complaints today. Wife says his SOB appears better and no cough is reported. He denied any CP and was anxious to eat his lunch. Reportedly Palliative Care was following as an OP and had started broaching discussions about DNR and such although no conclusion had been made. Assessment & Plan:     Acute on chronic systolic heart failure, NYHA Class III (POA)  - ECHO recently with EF 20% and diffuse hypokinesis -> no need to repeat  - Continue all current cardiac medications  - IV diuresis with Bumex  - Discussed with Cardiology and agree  - Troponins flat  - Noted proBNP 57532 on admission    Hypokalemia (POA)  - K+ 2.9 this am -> replace IV and recheck this afternoon    Hypomagnesia (POA)  - Mg 1.3 -> replace additional 2g and recheck    Atrial fibrillation   - Rate controlled presently with Toprol-XL  - No OAC due to fall risk    Recent diarrhea  - None in last several days per wife    H/o BPH  - Continue Flomax    CKD, stage III  - Baseline    H/o AAA  H/o CVA    Code status: Full  DVT prophylaxis: Heparin SQ    Care Plan discussed with: Patient, RN, Wife, Dr. Breanna Christopher, Attending  Disposition: Home. They are not interested in rehab at all.  HHT already arranged from previous admissions     Hospital Problems  Date Reviewed: 3/9/2018          Codes Class Noted POA    Chest pain ICD-10-CM: R07.9  ICD-9-CM: 786.50  3/9/2018 Unknown        * (Principal)SOB (shortness of breath) ICD-10-CM: R06.02  ICD-9-CM: 786.05  2/8/2018 Unknown                Review of Systems:   Denied CP or SOB at present, no abdominal pain, + hungry       Vital Signs:    Last 24hrs VS reviewed since prior progress note. Most recent are:  Visit Vitals    /72 (BP 1 Location: Right arm, BP Patient Position: At rest)    Pulse 79    Temp 97.8 °F (36.6 °C)    Resp 20    Ht 5' 9\" (1.753 m)    Wt 78.6 kg (173 lb 4.8 oz)    SpO2 97%    BMI 25.59 kg/m2         Intake/Output Summary (Last 24 hours) at 03/10/18 1257  Last data filed at 03/10/18 1113   Gross per 24 hour   Intake                0 ml   Output              705 ml   Net             -705 ml        Physical Examination:             Constitutional:  No acute distress, cooperative, pleasant    ENT:  Oral mucous moist, oropharynx benign. Neck supple,    Resp:  CTA bilaterally. No wheezing/rhonchi/rales. No accessory muscle use   CV:  Irregular rhythm, normal rate, no murmurs, gallops, rubs    GI:  Soft, non distended, non tender. normoactive bowel sounds, no hepatosplenomegaly     Musculoskeletal:  Trace pedal edema, warm, 2+ pulses throughout. BOWLING    Neurologic:  Moves all extremities. AAOx3, CN II-XII reviewed.  Occasional confusion     Psych:  Questionable insight  Skin:  Good turgor, no rashes or ulcers       Data Review:    Review and/or order of clinical lab test  Review and/or order of tests in the radiology section of CPT  Review and/or order of tests in the medicine section of CPT  Decision to obtain old records and/or obtain history from someone other than the patient      Labs:     Recent Labs      03/10/18   0532  03/09/18   2046   WBC  6.5  7.1   HGB  9.8*  11.1*   HCT  29.8*  34.0*   PLT  140*  137*     Recent Labs 03/10/18   0532  03/09/18 2046   NA  142  143   K  2.9*  3.1*   CL  111*  110*   CO2  18*  21   BUN  20  22*   CREA  1.30  1.53*   GLU  107*  102*   CA  7.8*  8.0*   MG   --   1.3*     Recent Labs      03/09/18 2046   SGOT  31   ALT  22   AP  147*   TBILI  0.9   TP  8.3*   ALB  3.2*   GLOB  5.1*     No results for input(s): INR, PTP, APTT in the last 72 hours. No lab exists for component: INREXT   No results for input(s): FE, TIBC, PSAT, FERR in the last 72 hours. No results found for: FOL, RBCF   No results for input(s): PH, PCO2, PO2 in the last 72 hours.   Recent Labs      03/10/18   0532  03/10/18   0020  03/09/18 2046   TROIQ  <0.04  0.05*  0.06*     Lab Results   Component Value Date/Time    Cholesterol, total 116 02/08/2018 06:05 PM    HDL Cholesterol 35 02/08/2018 06:05 PM    LDL, calculated 63.6 02/08/2018 06:05 PM    Triglyceride 87 02/08/2018 06:05 PM    CHOL/HDL Ratio 3.3 02/08/2018 06:05 PM     No results found for: GLUCPOC  No results found for: COLOR, APPRN, SPGRU, REFSG, SERGIO, PROTU, GLUCU, KETU, BILU, UROU, MARCELA, LEUKU, GLUKE, EPSU, BACTU, WBCU, RBCU, CASTS, UCRY      Medications Reviewed:     Current Facility-Administered Medications   Medication Dose Route Frequency    aspirin chewable tablet 81 mg  81 mg Oral DAILY    atorvastatin (LIPITOR) tablet 40 mg  40 mg Oral DAILY    cyanocobalamin (VITAMIN B12) tablet 1,000 mcg  1,000 mcg Oral DAILY    finasteride (PROSCAR) tablet 5 mg  5 mg Oral DAILY    losartan (COZAAR) tablet 12.5 mg  12.5 mg Oral DAILY    metoprolol succinate (TOPROL-XL) XL tablet 50 mg  50 mg Oral DAILY    multivitamin, tx-iron-ca-min (THERA-M w/ IRON) tablet 1 Tab  1 Tab Oral DAILY    tamsulosin (FLOMAX) capsule 0.4 mg  0.4 mg Oral DAILY    sodium chloride (NS) flush 5-10 mL  5-10 mL IntraVENous Q8H    sodium chloride (NS) flush 5-10 mL  5-10 mL IntraVENous PRN    nitroglycerin (NITROSTAT) tablet 0.4 mg  0.4 mg SubLINGual Q5MIN PRN    heparin (porcine) injection 5,000 Units  5,000 Units SubCUTAneous Q8H    bumetanide (BUMEX) injection 1 mg  1 mg IntraVENous BID     ______________________________________________________________________  EXPECTED LENGTH OF STAY: - - -  ACTUAL LENGTH OF STAY:          1                 Catrachita Pinon NP

## 2018-03-10 NOTE — INTERDISCIPLINARY ROUNDS
IDR/SLIDR Summary          Patient: Mishel Glasgow MRN: [id number]    Age: 80 y.o. YOB: 1931 Room/Bed: [facility]   Admit Diagnosis: Chest pain  SOB (shortness of breath)  Principal Diagnosis: SOB (shortness of breath)   Goals: electrolyte replacement, diuretics, PT/OT  Readmission: YES  Quality Measure: CHF  VTE Prophylaxis: Chemical  Influenza Vaccine screening completed? YES  Pneumococcal Vaccine screening completed? Mobility needs: Yes   Nutrition plan:Yes  Consults:P.T, O.T. and Case Management    Financial concerns:No  Escalated to CM? RRAT Score: 16   Interventions: H2H  Testing due for pt today?  NO  LOS: 1 days Expected length of stay 3-4 days  Discharge plan: pending   PCP: Crystal Raines MD  Transportation needs: Yes    Days before discharge:two or more days before discharge   Discharge disposition: Home    Signed:     Cecilio Coombs RN  3/10/2018  0930

## 2018-03-11 LAB
ANION GAP SERPL CALC-SCNC: 10 MMOL/L (ref 5–15)
BUN SERPL-MCNC: 22 MG/DL (ref 6–20)
BUN/CREAT SERPL: 16 (ref 12–20)
CALCIUM SERPL-MCNC: 8.2 MG/DL (ref 8.5–10.1)
CHLORIDE SERPL-SCNC: 112 MMOL/L (ref 97–108)
CO2 SERPL-SCNC: 21 MMOL/L (ref 21–32)
CREAT SERPL-MCNC: 1.34 MG/DL (ref 0.7–1.3)
ERYTHROCYTE [DISTWIDTH] IN BLOOD BY AUTOMATED COUNT: 17.9 % (ref 11.5–14.5)
GLUCOSE BLD STRIP.AUTO-MCNC: 116 MG/DL (ref 65–100)
GLUCOSE SERPL-MCNC: 105 MG/DL (ref 65–100)
HCT VFR BLD AUTO: 30.2 % (ref 36.6–50.3)
HGB BLD-MCNC: 10 G/DL (ref 12.1–17)
MAGNESIUM SERPL-MCNC: 2.1 MG/DL (ref 1.6–2.4)
MCH RBC QN AUTO: 32.6 PG (ref 26–34)
MCHC RBC AUTO-ENTMCNC: 33.1 G/DL (ref 30–36.5)
MCV RBC AUTO: 98.4 FL (ref 80–99)
NRBC # BLD: 0.05 K/UL (ref 0–0.01)
NRBC BLD-RTO: 0.8 PER 100 WBC
PLATELET # BLD AUTO: 138 K/UL (ref 150–400)
PMV BLD AUTO: 12.6 FL (ref 8.9–12.9)
POTASSIUM SERPL-SCNC: 3.2 MMOL/L (ref 3.5–5.1)
RBC # BLD AUTO: 3.07 M/UL (ref 4.1–5.7)
SERVICE CMNT-IMP: ABNORMAL
SODIUM SERPL-SCNC: 143 MMOL/L (ref 136–145)
WBC # BLD AUTO: 5.9 K/UL (ref 4.1–11.1)

## 2018-03-11 PROCEDURE — 74011250636 HC RX REV CODE- 250/636: Performed by: FAMILY MEDICINE

## 2018-03-11 PROCEDURE — 85027 COMPLETE CBC AUTOMATED: CPT | Performed by: NURSE PRACTITIONER

## 2018-03-11 PROCEDURE — 80048 BASIC METABOLIC PNL TOTAL CA: CPT | Performed by: NURSE PRACTITIONER

## 2018-03-11 PROCEDURE — 36415 COLL VENOUS BLD VENIPUNCTURE: CPT | Performed by: NURSE PRACTITIONER

## 2018-03-11 PROCEDURE — 74011250637 HC RX REV CODE- 250/637: Performed by: FAMILY MEDICINE

## 2018-03-11 PROCEDURE — 97161 PT EVAL LOW COMPLEX 20 MIN: CPT

## 2018-03-11 PROCEDURE — 82962 GLUCOSE BLOOD TEST: CPT

## 2018-03-11 PROCEDURE — 96366 THER/PROPH/DIAG IV INF ADDON: CPT

## 2018-03-11 PROCEDURE — 96372 THER/PROPH/DIAG INJ SC/IM: CPT

## 2018-03-11 PROCEDURE — 65660000000 HC RM CCU STEPDOWN

## 2018-03-11 PROCEDURE — 74011000250 HC RX REV CODE- 250: Performed by: FAMILY MEDICINE

## 2018-03-11 PROCEDURE — 74011250636 HC RX REV CODE- 250/636: Performed by: INTERNAL MEDICINE

## 2018-03-11 PROCEDURE — 74011250637 HC RX REV CODE- 250/637: Performed by: NURSE PRACTITIONER

## 2018-03-11 PROCEDURE — 97530 THERAPEUTIC ACTIVITIES: CPT

## 2018-03-11 PROCEDURE — 83735 ASSAY OF MAGNESIUM: CPT | Performed by: NURSE PRACTITIONER

## 2018-03-11 PROCEDURE — 65390000012 HC CONDITION CODE 44 OBSERVATION

## 2018-03-11 PROCEDURE — 96376 TX/PRO/DX INJ SAME DRUG ADON: CPT

## 2018-03-11 RX ORDER — POTASSIUM CHLORIDE 7.45 MG/ML
10 INJECTION INTRAVENOUS
Status: COMPLETED | OUTPATIENT
Start: 2018-03-11 | End: 2018-03-12

## 2018-03-11 RX ORDER — POTASSIUM CHLORIDE 750 MG/1
30 TABLET, FILM COATED, EXTENDED RELEASE ORAL DAILY
Status: DISCONTINUED | OUTPATIENT
Start: 2018-03-12 | End: 2018-03-13 | Stop reason: HOSPADM

## 2018-03-11 RX ADMIN — POTASSIUM CHLORIDE 10 MEQ: 7.46 INJECTION, SOLUTION INTRAVENOUS at 04:07

## 2018-03-11 RX ADMIN — HEPARIN SODIUM 5000 UNITS: 5000 INJECTION, SOLUTION INTRAVENOUS; SUBCUTANEOUS at 01:11

## 2018-03-11 RX ADMIN — TAMSULOSIN HYDROCHLORIDE 0.4 MG: 0.4 CAPSULE ORAL at 08:29

## 2018-03-11 RX ADMIN — METOPROLOL SUCCINATE 50 MG: 50 TABLET, EXTENDED RELEASE ORAL at 08:28

## 2018-03-11 RX ADMIN — BUMETANIDE 1 MG: 0.25 INJECTION INTRAMUSCULAR; INTRAVENOUS at 08:30

## 2018-03-11 RX ADMIN — ATORVASTATIN CALCIUM 40 MG: 40 TABLET, FILM COATED ORAL at 08:28

## 2018-03-11 RX ADMIN — HEPARIN SODIUM 5000 UNITS: 5000 INJECTION, SOLUTION INTRAVENOUS; SUBCUTANEOUS at 17:03

## 2018-03-11 RX ADMIN — FINASTERIDE 5 MG: 5 TABLET, FILM COATED ORAL at 08:29

## 2018-03-11 RX ADMIN — POTASSIUM CHLORIDE 10 MEQ: 7.46 INJECTION, SOLUTION INTRAVENOUS at 01:12

## 2018-03-11 RX ADMIN — MULTIPLE VITAMINS W/ MINERALS TAB 1 TABLET: TAB at 08:29

## 2018-03-11 RX ADMIN — Medication 10 ML: at 08:10

## 2018-03-11 RX ADMIN — Medication 1000 MCG: at 08:28

## 2018-03-11 RX ADMIN — POTASSIUM CHLORIDE 20 MEQ: 750 TABLET, EXTENDED RELEASE ORAL at 08:29

## 2018-03-11 RX ADMIN — POTASSIUM CHLORIDE 10 MEQ: 7.46 INJECTION, SOLUTION INTRAVENOUS at 05:04

## 2018-03-11 RX ADMIN — LOSARTAN POTASSIUM 12.5 MG: 25 TABLET ORAL at 08:28

## 2018-03-11 RX ADMIN — BUMETANIDE 1 MG: 0.25 INJECTION INTRAMUSCULAR; INTRAVENOUS at 17:03

## 2018-03-11 RX ADMIN — POTASSIUM CHLORIDE 10 MEQ: 7.46 INJECTION, SOLUTION INTRAVENOUS at 05:01

## 2018-03-11 RX ADMIN — Medication 10 ML: at 13:02

## 2018-03-11 RX ADMIN — HEPARIN SODIUM 5000 UNITS: 5000 INJECTION, SOLUTION INTRAVENOUS; SUBCUTANEOUS at 08:30

## 2018-03-11 RX ADMIN — ASPIRIN 81 MG 81 MG: 81 TABLET ORAL at 08:29

## 2018-03-11 NOTE — PROGRESS NOTES
Bedside shift change report given to Percy Acosta RN (oncoming nurse) by Andres Rahman RN (offgoing nurse). Report included the following information SBAR, Kardex, ED Summary, Procedure Summary, Intake/Output, MAR, Accordion, Recent Results, Med Rec Status, Cardiac Rhythm A-Fib, frequent PVCs, V-Tach and Alarm Parameters .

## 2018-03-11 NOTE — PROGRESS NOTES
[facility] Progress Note         NAME:  Don Smith   :   1931   MRN:   [id number]     Assessment/Plan:CMP: severe , he seems better compensated today . Pleasantly confused no clear complaints of sob or cp  Continue toprol losartan and iv bumex until tomorrow. -FB almost 1000 cc thus far  Creatinine stable  Follow up cxr tomorrow  AF: persistent. They have declined [address] in the past continue asa rate is controlled  Lytes as per primary team     Subjective:   Cardiac ROS: Patient denies any exertional chest pain, dyspnea, palpitations, syncope, orthopnea, edema or paroxysmal nocturnal dyspnea. Review of Systems:   Pertinent items are noted in the History of Present Illness. Objective:             1901 -  0700  In: 120 [P.O.:120]  Out: 1105 [Urine:1005]    Telemetry: AFIB    Physical Exam:  Visit Vitals    BP (!) 126/93    Pulse 82    Temp 97.5 °F (36.4 °C)    Resp 20    Ht 5' 9\" (1.753 m)    Wt 80 kg (176 lb 4.8 oz)    SpO2 97%    BMI 26.03 kg/m2       Neck: no JVD  Heart: irregularly irregular rhythm  Lungs: diminished breath sounds R anterior, L anterior  Abdomen: soft, non-tender. Bowel sounds normal. No masses,  no organomegaly  Extremities: no edema    Additional comments: None    Care Plan discussed with:    Comments   Patient     Family      RN     Care Manager                    Consultant:        Data Review:     No results for input(s): TNIPOC in the last 72 hours.     No lab exists for component: ITNL   Recent Labs      03/10/18   0532  03/10/18   0020  18   TROIQ  <0.04  0.05*  0.06*     Recent Labs      18   0301  03/10/18   2152  03/10/18   1833  03/10/18   0532  18   204   NA  143   --    --   142  143   K  3.2*  3.4*  3.6  2.9*  3.1*   CL  112*   --    --   111*  110*   CO2  21   --    --   18*  21   BUN  22*   --    --   20  22*   CREA  1.34*   --    --   1.30  1.53*   GLU  105*   --    -- 107*  102*   PHOS   --   3.0   --    --    --    MG  2.1  2.1   --    --   1.3*   ALB   --    --    --    --   3.2*   WBC  5.9   --    --   6.5  7.1   HGB  10.0*   --    --   9.8*  11.1*   HCT  30.2*   --    --   29.8*  34.0*   PLT  138*   --    --   140*  137*     No results for input(s): INR, PTP, APTT in the last 72 hours.     No lab exists for component: INREXT    Medications reviewed  Current Facility-Administered Medications   Medication Dose Route Frequency    potassium chloride SR (KLOR-CON 10) tablet 20 mEq  20 mEq Oral DAILY    aspirin chewable tablet 81 mg  81 mg Oral DAILY    atorvastatin (LIPITOR) tablet 40 mg  40 mg Oral DAILY    cyanocobalamin (VITAMIN B12) tablet 1,000 mcg  1,000 mcg Oral DAILY    finasteride (PROSCAR) tablet 5 mg  5 mg Oral DAILY    losartan (COZAAR) tablet 12.5 mg  12.5 mg Oral DAILY    metoprolol succinate (TOPROL-XL) XL tablet 50 mg  50 mg Oral DAILY    multivitamin, tx-iron-ca-min (THERA-M w/ IRON) tablet 1 Tab  1 Tab Oral DAILY    tamsulosin (FLOMAX) capsule 0.4 mg  0.4 mg Oral DAILY    sodium chloride (NS) flush 5-10 mL  5-10 mL IntraVENous Q8H    sodium chloride (NS) flush 5-10 mL  5-10 mL IntraVENous PRN    nitroglycerin (NITROSTAT) tablet 0.4 mg  0.4 mg SubLINGual Q5MIN PRN    heparin (porcine) injection 5,000 Units  5,000 Units SubCUTAneous Q8H    bumetanide (BUMEX) injection 1 mg  1 mg IntraVENous BID       Data Reviewed: current meds, labs,recent radiology, intake/output/weight and problem list reviewed    Kale Trevino MD

## 2018-03-11 NOTE — PROGRESS NOTES
Problem: Mobility Impaired (Adult and Pediatric)  Goal: *Acute Goals and Plan of Care (Insert Text)  Physical Therapy Goals  Initiated 3/11/2018  1. Patient will move from supine to sit and sit to supine  in bed with supervision/set-up within 7 day(s). 2.  Patient will transfer from bed to chair and chair to bed with supervision/set-up using the least restrictive device within 7 day(s). 3.  Patient will perform sit to stand with supervision/set-up within 7 day(s). 4.  Patient will ambulate with supervision/set-up for 50 feet with the least restrictive device within 7 day(s). 5.  Patient will ascend/descend 5 stairs with handrail(s) per home needs with minimal assistance/contact guard assist within 7 day(s). physical Therapy EVALUATION  Patient: Elvis Rivas (89 y.o. male)  Date: 3/11/2018  Primary Diagnosis: Chest pain  SOB (shortness of breath)   Precautions:   Fall, Bed Alarm (AMS)    ASSESSMENT :  Based on the objective data described below, the patient presents with generally decreased strength, balance, ROM, and safety awareness with all functional mobility today  Wife present and speaking on behalf of pt majority of session. She states pt is up with walker at home generally independently. She has an aide at home with pt 3x/week when she goes to HD, though is planned to get assist  5 days per week for 5 hrs per her report. Pt is confused and is not able to identify where he is multiple times today (ie find the doorway to exit bathroom or notice that he was out of bathroom and in room). Pt will need 24/7 assist and wife is generally adamant about pt returning home. Unsure of her physical abilities as she uses a walker and is on HD. Pt likely to be appropriate for [facility] at this state and SNF is recommended for immediate D/C. Should she wish to take pt home, recommend HHPT/OT consults for safety assessments.     Patient will benefit from skilled intervention to address the above impairments. Patients rehabilitation potential is considered to be Fair  Factors which may influence rehabilitation potential include:   []         None noted  []         Mental ability/status  []         Medical condition  []         Home/family situation and support systems  []         Safety awareness  []         Pain tolerance/management  []         Other:      PLAN :  Recommendations and Planned Interventions:  []           Bed Mobility Training             []    Neuromuscular Re-Education  []           Transfer Training                   []    Orthotic/Prosthetic Training  []           Gait Training                         []    Modalities  []           Therapeutic Exercises           []    Edema Management/Control  []           Therapeutic Activities            []    Patient and Family Training/Education  []           Other (comment):    Frequency/Duration: Patient will be followed by physical therapy  5 times a week to address goals. Discharge Recommendations: Home Health with 24/7 supervision and [address] Recommendations for Discharge: TBD     SUBJECTIVE:   Patient stated Im looking for the door [PT standing on other side of doorway].     OBJECTIVE DATA SUMMARY:   HISTORY:    Past Medical History:   Diagnosis Date    Arthritis     Heart failure ([facility] [address]     Hypertension      Past Surgical History:   Procedure Laterality Date    HX PROSTATECTOMY       Prior Level of Function/Home Situation: indep with ADLs (unable to don socks today)  Personal factors and/or comorbidities impacting plan of care: AMS, confused    Home Situation  Home Environment: Private residence  # Steps to Enter: 5  Wheelchair Ramp: No  One/Two Story Residence: One story  Living Alone: No  Support Systems: Spouse/Significant Other/Partner  Patient Expects to be Discharged to[de-identified] Private residence  Current DME Used/Available at Home: Walker, rolling  Tub or Shower Type: Tub/Shower combination    EXAMINATION/PRESENTATION/DECISION MAKING:   Critical Behavior:  Neurologic State: Alert  Orientation Level: Oriented to person, Oriented to place, Disoriented to situation, Disoriented to time  Cognition: Impaired decision making, Decreased attention/concentration, Decreased command following, Poor safety awareness  Safety/Judgement: Decreased insight into deficits, Decreased awareness of need for safety, Decreased awareness of need for assistance, Decreased awareness of environment  Hearing: Auditory  Auditory Impairment: Hard of hearing, left side  Range Of Motion:  AROM: Generally decreased, functional                       Strength:    Strength: Generally decreased, functional                    Tone & Sensation:   Tone: Normal              Sensation:  (NT)               Coordination:  Coordination: Within functional limits  Functional Mobility:  Bed Mobility:  Rolling:  (NT)  Supine to Sit: Minimum assistance; Additional time (cues and assist to initiaite LEs to left EOB)  Sit to Supine:  (NT; OOB to chair)  Scooting: Stand-by assistance  Transfers:  Sit to Stand: Stand-by assistance  Stand to Sit: Stand-by assistance (cues for alignment and walker placement)  Balance:   Sitting: Intact  Standing: Impaired; With support  Standing - Static: Fair  Standing - Dynamic : Fair  Ambulation/Gait Training:  Distance (ft): 6 Feet (ft) (x2)  Assistive Device: Gait belt;Walker, rolling  Ambulation - Level of Assistance: Minimal assistance; Additional time (pt unable to find door in bathroom/unable to tell which room)     Gait Description (WDL): Exceptions to WDL  Gait Abnormalities: Decreased step clearance;Shuffling gait; Step to gait (BLE genu valgus, cues for task completion)        Base of Support: Center of gravity altered     Speed/Phyllis: Slow;Pace decreased (<100 feet/min)  Step Length: Right shortened;Left shortened     Stairs:     Stairs - Level of Assistance:  (NT)  Functional Measure:  Tinetti test:    Sitting Balance: 1  Arises: 1  Attempts to Rise: 2  Immediate Standing Balance: 1  Standing Balance: 1  Nudged: 0  Eyes Closed: 0  Turn 360 Degrees - Continuous/Discontinuous: 0  Turn 360 Degrees - Steady/Unsteady: 0  Sitting Down: 1  Balance Score: 7  Indication of Gait: 0  R Step Length/Height: 0  L Step Length/Height: 0  R Foot Clearance: 1  L Foot Clearance: 1  Step Symmetry: 0  Step Continuity: 0  Path: 1  Trunk: 0  Walking Time: 0  Gait Score: 3  Total Score: 10       Tinetti Test and G-code impairment scale:  Percentage of Impairment CH    0%   CI    1-19% CJ    20-39% CK    40-59% CL    60-79% CM    80-99% CN     100%   Tinetti  Score 0-28 28 23-27 17-22 12-16 6-11 1-5 0       Tinetti Tool Score Risk of Falls  <19 = High Fall Risk  19-24 = Moderate Fall Risk  25-28 = Low Fall Risk  Tinetti ME. Performance-Oriented Assessment of Mobility Problems in Elderly Patients. [place] [address]; [id number]. (Scoring Description: PT Bulletin Feb. 10, 1993)    Older adults: Parvin Rodriguez et al, 2009; n = 1000 [facility] elderly evaluated with ABC, MARIANO, ADL, and IADL)  · Mean MARIANO score for males aged 69-68 years = 26.21(3.40)  · Mean MARIANO score for females age 69-68 years = 25.16(4.30)  · Mean MARIANO score for males over 80 years = 23.29(6.02)  · Mean MARIANO score for females over 80 years = 17.20(8.32)         G codes: In compliance with CMSs Claims Based Outcome Reporting, the following G-code set was chosen for this patient based on their primary functional limitation being treated: The outcome measure chosen to determine the severity of the functional limitation was the Tinetti with a score of 10/28 which was correlated with the impairment scale.     ? Mobility - Walking and Moving Around:     - CURRENT STATUS: CL - 60%-79% impaired, limited or restricted    - GOAL STATUS: CK - 40%-59% impaired, limited or restricted    - D/C STATUS:  ---------------To be determined---------------     Pain:  Pain Scale 1: Numeric (0 - 10)  Pain Intensity 1: 0  Activity Tolerance:   NAD on 2 L NC throughout session  After treatment:   [x]         Patient left in no apparent distress sitting up in chair  []         Patient left in no apparent distress in bed  [x]         Call bell left within reach  [x]         Nursing notified  [x]         Caregiver present  [x]         Bed alarm activated    COMMUNICATION/EDUCATION:   The patients plan of care was discussed with: Registered Nurse. [x]         Fall prevention education was provided and the patient/caregiver indicated understanding. [x]         Patient/family have participated as able in goal setting and plan of care. [x]         Patient/family agree to work toward stated goals and plan of care. []         Patient understands intent and goals of therapy, but is neutral about his/her participation. []         Patient is unable to participate in goal setting and plan of care.     Thank you for this referral.  Navid Fajardo   Time Calculation: 38 mins

## 2018-03-11 NOTE — PROGRESS NOTES
Problem: Falls - Risk of  Goal: *Absence of Falls  Document [place] Fall Risk and appropriate interventions in the flowsheet.    Outcome: Progressing Towards Goal  Fall Risk Interventions:  Mobility Interventions: Assess mobility with egress test, Bed/chair exit alarm, Communicate number of staff needed for ambulation/transfer, OT consult for ADLs, Patient to call before getting OOB, PT Consult for mobility concerns, PT Consult for assist device competence    Mentation Interventions: Adequate sleep, hydration, pain control, Bed/chair exit alarm, Door open when patient unattended, Evaluate medications/consider consulting pharmacy, Eyeglasses and hearing aids, Familiar objects from home, Family/sitter at bedside, HELP ([address]) if available, Increase mobility, More frequent rounding, Reorient patient, Room close to nurse's station, Toileting rounds, Update white board    Medication Interventions: Assess postural VS orthostatic hypotension, Bed/chair exit alarm, Evaluate medications/consider consulting pharmacy, Patient to call before getting OOB, Teach patient to arise slowly         History of Falls Interventions: Bed/chair exit alarm, Consult care management for discharge planning, Door open when patient unattended, Evaluate medications/consider consulting pharmacy, Investigate reason for fall, Room close to nurse's station

## 2018-03-11 NOTE — PROGRESS NOTES
Pt has been AOx2 throughout the day. Around 1830 pt impulsive, pulling off gown and telemetry electrodes. Spouse in room yelling at pt and demanding to speak with a physician as she states, \"This is not how my  acts. I am an intelligent woman and know that for a fact. \" Moreno Dwyer NP. Pt placed back to bed. Pt calm and cooperative. Will continue to monitor.

## 2018-03-11 NOTE — PROGRESS NOTES
Bedside shift change report given to Kwame Tovar RN (oncoming nurse) by JENNIFER Tao (offgoing nurse). Report included the following information SBAR, Kardex, Intake/Output, MAR, Accordion, Recent Results and Cardiac Rhythm AFIB with PVCs.

## 2018-03-11 NOTE — PROGRESS NOTES
2000 - Assume pt. Care. Alert, confuse A&O x2-3. Asymptomatic. Wife at bedside. Per report, pt had 2 beats of V-tach during the day. Cardiologist consult. 0008 - Alert, confuse A&O x2-3. Asymptomatic. Wife at bedside. pt. Had 5 runs of V-tach (4 beats and 4 beats @ 2020/ 6 beats @ 2038/ 5 beats @0008). Notified MD (Dr. Caprice Bangura)  Received order for Lab K, Mg, P.    0100 - Notify MD of Lab result (K:3.4, M.1, P:3.0), received order for IV K replacement 40 mEq.

## 2018-03-11 NOTE — INTERDISCIPLINARY ROUNDS
IDR/SLIDR Summary          Patient: Bonita Daniel MRN: [id number]    Age: 80 y.o. YOB: 1931 Room/Bed: [facility]   Admit Diagnosis: Chest pain  SOB (shortness of breath)  Principal Diagnosis: SOB (shortness of breath)   Goals: avoid falls, replace electrolytes, diuretics, PT/OT   Readmission: YES  Quality Measure: CHF  VTE Prophylaxis: Chemical  Influenza Vaccine screening completed? YES  Pneumococcal Vaccine screening completed? Mobility needs: Yes   Nutrition plan:Yes  Consults:P.T, O.T. and Case Management    Financial concerns:No  Escalated to CM? RRAT Score: 16   Interventions:H2H  Testing due for pt today?  NO  LOS: 2 days Expected length of stay  days  Discharge plan: pending   PCP: Crystal Raines MD  Transportation needs: Yes    Days before discharge:two or more days before discharge   Discharge disposition: Home    Signed:     Kevin Porras  3/11/2018  5134

## 2018-03-11 NOTE — PROGRESS NOTES
Per night shift report, pt had a few runs of VT all less than 6 beat runs. Pt asymptomatic during these times. Relayed this information to Dr. Ben Cruz. Will continue to monitor.

## 2018-03-11 NOTE — PROGRESS NOTES
Hospitalist Progress Note  Prabhu Man MD  Answering service: [phone] from in house phone  Cell: [phone]      Date of Service:  3/11/2018  NAME:  Lm Condon  :  1931  MRN:  [id number]      Admission Summary:   Mr. Rosario Hernandez is a 79 yo male with PMH significant for chronic systolic heart failure, NYHA Class III - IV, atrial fibrillation, DVT, CKD stage 3, AA, transaminitis, CVA, recent concussion, and BPH. Presented to the ED on 3/9 due to CP, inability to lie flat, progressive SOB and cough. He was recently admitted last month for similar event. He is followed by Dr. Tabby Arboleda and ACP has been in progress. Interval history / Subjective:   No acute events. Discussed in detail with wife. She is still not clear about his diagnosis of heart failure says my  was never sick all his life. Assessment & Plan:     Acute on chronic systolic heart failure, NYHA Class III (POA)  - ECHO recently with EF 20% and diffuse hypokinesis -> no need to repeat  - Continue all current cardiac medications  - IV diuresis with Bumex urine output not measured will use condom cath  - Discussed with Cardiology and agree  - Troponins flat  - Noted proBNP 30068 on admission    Hypokalemia (POA)  - replace. Hypomagnesia (POA)  - Mg 1.3 -> replace additional 2g and recheck    Atrial fibrillation   - Rate controlled presently with Toprol-XL  - No OAC due to fall risk    Recent diarrhea  - None in last several days per wife    H/o BPH  - Continue Flomax    CKD, stage III  - Baseline    H/o AAA  H/o CVA    Code status: Full  DVT prophylaxis: Heparin SQ    Care Plan discussed with: Patient, RN, Wife, Dr. Marco Carr, Attending  Disposition: Home. They are not interested in rehab at all.  HHT already arranged from previous admissions     Hospital Problems  Date Reviewed: 3/9/2018          Codes Class Noted POA    Chest pain ICD-10-CM: R07.9  ICD-9-CM: 786.50 3/9/2018 Unknown        * (Principal)SOB (shortness of breath) ICD-10-CM: R06.02  ICD-9-CM: 786.05  2/8/2018 Unknown                Review of Systems:   Denied CP or SOB at present, no abdominal pain, + hungry       Vital Signs:    Last 24hrs VS reviewed since prior progress note. Most recent are:  Visit Vitals    /65 (BP 1 Location: Right arm, BP Patient Position: At rest)    Pulse 80    Temp 97.7 °F (36.5 °C)    Resp 20    Ht 5' 9\" (1.753 m)    Wt 80 kg (176 lb 4.8 oz)    SpO2 99%    BMI 26.03 kg/m2         Intake/Output Summary (Last 24 hours) at 03/11/18 1121  Last data filed at 03/11/18 0800   Gross per 24 hour   Intake              360 ml   Output              400 ml   Net              -40 ml        Physical Examination:             Constitutional:  No acute distress, cooperative, pleasant    ENT:  Oral mucous moist, oropharynx benign. Neck supple,    Resp:  CTA bilaterally. No wheezing/rhonchi/rales. No accessory muscle use   CV:  Irregular rhythm, normal rate, no murmurs, gallops, rubs    GI:  Soft, non distended, non tender. normoactive bowel sounds, no hepatosplenomegaly     Musculoskeletal:  Trace pedal edema, warm, 2+ pulses throughout. BOWLING    Neurologic:  Moves all extremities. AAOx3, CN II-XII reviewed.  Occasional confusion     Psych:  Questionable insight  Skin:  Good turgor, no rashes or ulcers       Data Review:    Review and/or order of clinical lab test  Review and/or order of tests in the radiology section of CPT  Review and/or order of tests in the medicine section of CPT  Decision to obtain old records and/or obtain history from someone other than the patient      Labs:     Recent Labs      03/11/18   0301  03/10/18   0532   WBC  5.9  6.5   HGB  10.0*  9.8*   HCT  30.2*  29.8*   PLT  138*  140*     Recent Labs      03/11/18   0301  03/10/18   2152  03/10/18   1833  03/10/18   0532  03/09/18   2046   NA  143   --    --   142  143   K  3.2*  3.4*  3.6  2.9*  3.1*   CL  112*   -- --   111*  110*   CO2  21   --    --   18*  21   BUN  22*   --    --   20  22*   CREA  1.34*   --    --   1.30  1.53*   GLU  105*   --    --   107*  102*   CA  8.2*   --    --   7.8*  8.0*   MG  2.1  2.1   --    --   1.3*   PHOS   --   3.0   --    --    --      Recent Labs      03/09/18 2046   SGOT  31   ALT  22   AP  147*   TBILI  0.9   TP  8.3*   ALB  3.2*   GLOB  5.1*     No results for input(s): INR, PTP, APTT in the last 72 hours. No lab exists for component: INREXT, INREXT   No results for input(s): FE, TIBC, PSAT, FERR in the last 72 hours. No results found for: FOL, RBCF   No results for input(s): PH, PCO2, PO2 in the last 72 hours.   Recent Labs      03/10/18   0532  03/10/18   0020  03/09/18 2046   TROIQ  <0.04  0.05*  0.06*     Lab Results   Component Value Date/Time    Cholesterol, total 116 02/08/2018 06:05 PM    HDL Cholesterol 35 02/08/2018 06:05 PM    LDL, calculated 63.6 02/08/2018 06:05 PM    Triglyceride 87 02/08/2018 06:05 PM    CHOL/HDL Ratio 3.3 02/08/2018 06:05 PM     Lab Results   Component Value Date/Time    Glucose (POC) 116 (H) 03/11/2018 02:59 AM     No results found for: COLOR, APPRN, SPGRU, REFSG, SERGIO, PROTU, GLUCU, KETU, BILU, UROU, MARCELA, LEUKU, GLUKE, EPSU, BACTU, WBCU, RBCU, CASTS, UCRY      Medications Reviewed:     Current Facility-Administered Medications   Medication Dose Route Frequency    potassium chloride SR (KLOR-CON 10) tablet 20 mEq  20 mEq Oral DAILY    aspirin chewable tablet 81 mg  81 mg Oral DAILY    atorvastatin (LIPITOR) tablet 40 mg  40 mg Oral DAILY    cyanocobalamin (VITAMIN B12) tablet 1,000 mcg  1,000 mcg Oral DAILY    finasteride (PROSCAR) tablet 5 mg  5 mg Oral DAILY    losartan (COZAAR) tablet 12.5 mg  12.5 mg Oral DAILY    metoprolol succinate (TOPROL-XL) XL tablet 50 mg  50 mg Oral DAILY    multivitamin, tx-iron-ca-min (THERA-M w/ IRON) tablet 1 Tab  1 Tab Oral DAILY    tamsulosin (FLOMAX) capsule 0.4 mg  0.4 mg Oral DAILY    sodium chloride (NS) flush 5-10 mL  5-10 mL IntraVENous Q8H    sodium chloride (NS) flush 5-10 mL  5-10 mL IntraVENous PRN    nitroglycerin (NITROSTAT) tablet 0.4 mg  0.4 mg SubLINGual Q5MIN PRN    heparin (porcine) injection 5,000 Units  5,000 Units SubCUTAneous Q8H    bumetanide (BUMEX) injection 1 mg  1 mg IntraVENous BID     ______________________________________________________________________  EXPECTED LENGTH OF STAY: - - -  ACTUAL LENGTH OF STAY:          2                 Qasim Sanchez MD

## 2018-03-12 ENCOUNTER — APPOINTMENT (OUTPATIENT)
Dept: GENERAL RADIOLOGY | Age: 83
End: 2018-03-12
Attending: HOSPITALIST
Payer: MEDICARE

## 2018-03-12 LAB
ANION GAP SERPL CALC-SCNC: 11 MMOL/L (ref 5–15)
BUN SERPL-MCNC: 22 MG/DL (ref 6–20)
BUN/CREAT SERPL: 14 (ref 12–20)
CALCIUM SERPL-MCNC: 8.5 MG/DL (ref 8.5–10.1)
CHLORIDE SERPL-SCNC: 112 MMOL/L (ref 97–108)
CO2 SERPL-SCNC: 20 MMOL/L (ref 21–32)
CREAT SERPL-MCNC: 1.54 MG/DL (ref 0.7–1.3)
GLUCOSE SERPL-MCNC: 112 MG/DL (ref 65–100)
POTASSIUM SERPL-SCNC: 3.7 MMOL/L (ref 3.5–5.1)
SODIUM SERPL-SCNC: 143 MMOL/L (ref 136–145)

## 2018-03-12 PROCEDURE — 71046 X-RAY EXAM CHEST 2 VIEWS: CPT

## 2018-03-12 PROCEDURE — 74011000250 HC RX REV CODE- 250: Performed by: FAMILY MEDICINE

## 2018-03-12 PROCEDURE — 96366 THER/PROPH/DIAG IV INF ADDON: CPT

## 2018-03-12 PROCEDURE — 74011250636 HC RX REV CODE- 250/636: Performed by: FAMILY MEDICINE

## 2018-03-12 PROCEDURE — 74011250637 HC RX REV CODE- 250/637: Performed by: HOSPITALIST

## 2018-03-12 PROCEDURE — 65660000000 HC RM CCU STEPDOWN

## 2018-03-12 PROCEDURE — 74011250637 HC RX REV CODE- 250/637: Performed by: INTERNAL MEDICINE

## 2018-03-12 PROCEDURE — 97530 THERAPEUTIC ACTIVITIES: CPT

## 2018-03-12 PROCEDURE — 96372 THER/PROPH/DIAG INJ SC/IM: CPT

## 2018-03-12 PROCEDURE — 97116 GAIT TRAINING THERAPY: CPT

## 2018-03-12 PROCEDURE — 80048 BASIC METABOLIC PNL TOTAL CA: CPT | Performed by: HOSPITALIST

## 2018-03-12 PROCEDURE — 74011250637 HC RX REV CODE- 250/637: Performed by: FAMILY MEDICINE

## 2018-03-12 PROCEDURE — 36415 COLL VENOUS BLD VENIPUNCTURE: CPT | Performed by: HOSPITALIST

## 2018-03-12 PROCEDURE — 96376 TX/PRO/DX INJ SAME DRUG ADON: CPT

## 2018-03-12 PROCEDURE — 65390000012 HC CONDITION CODE 44 OBSERVATION

## 2018-03-12 RX ORDER — DIPHENHYDRAMINE HCL 25 MG
25 CAPSULE ORAL
Status: DISCONTINUED | OUTPATIENT
Start: 2018-03-12 | End: 2018-03-13 | Stop reason: HOSPADM

## 2018-03-12 RX ORDER — DIPHENHYDRAMINE HCL 12.5MG/5ML
25 ELIXIR ORAL ONCE
Status: COMPLETED | OUTPATIENT
Start: 2018-03-12 | End: 2018-03-12

## 2018-03-12 RX ADMIN — FINASTERIDE 5 MG: 5 TABLET, FILM COATED ORAL at 08:33

## 2018-03-12 RX ADMIN — DIPHENHYDRAMINE HYDROCHLORIDE 25 MG: 12.5 SOLUTION ORAL at 13:15

## 2018-03-12 RX ADMIN — ATORVASTATIN CALCIUM 40 MG: 40 TABLET, FILM COATED ORAL at 08:33

## 2018-03-12 RX ADMIN — Medication 1000 MCG: at 08:33

## 2018-03-12 RX ADMIN — HEPARIN SODIUM 5000 UNITS: 5000 INJECTION, SOLUTION INTRAVENOUS; SUBCUTANEOUS at 00:11

## 2018-03-12 RX ADMIN — DIPHENHYDRAMINE HYDROCHLORIDE 25 MG: 25 CAPSULE ORAL at 18:09

## 2018-03-12 RX ADMIN — HEPARIN SODIUM 5000 UNITS: 5000 INJECTION, SOLUTION INTRAVENOUS; SUBCUTANEOUS at 17:10

## 2018-03-12 RX ADMIN — POTASSIUM CHLORIDE 30 MEQ: 750 TABLET, EXTENDED RELEASE ORAL at 08:36

## 2018-03-12 RX ADMIN — METOPROLOL SUCCINATE 50 MG: 50 TABLET, EXTENDED RELEASE ORAL at 08:35

## 2018-03-12 RX ADMIN — HEPARIN SODIUM 5000 UNITS: 5000 INJECTION, SOLUTION INTRAVENOUS; SUBCUTANEOUS at 08:38

## 2018-03-12 RX ADMIN — MULTIPLE VITAMINS W/ MINERALS TAB 1 TABLET: TAB at 08:33

## 2018-03-12 RX ADMIN — TAMSULOSIN HYDROCHLORIDE 0.4 MG: 0.4 CAPSULE ORAL at 08:32

## 2018-03-12 RX ADMIN — Medication 10 ML: at 23:28

## 2018-03-12 RX ADMIN — HEPARIN SODIUM 5000 UNITS: 5000 INJECTION, SOLUTION INTRAVENOUS; SUBCUTANEOUS at 23:27

## 2018-03-12 RX ADMIN — BUMETANIDE 1 MG: 0.25 INJECTION INTRAMUSCULAR; INTRAVENOUS at 08:36

## 2018-03-12 RX ADMIN — Medication 10 ML: at 06:00

## 2018-03-12 RX ADMIN — Medication 10 ML: at 13:16

## 2018-03-12 RX ADMIN — LOSARTAN POTASSIUM 12.5 MG: 25 TABLET ORAL at 08:33

## 2018-03-12 RX ADMIN — BUMETANIDE 1 MG: 0.25 INJECTION INTRAMUSCULAR; INTRAVENOUS at 17:09

## 2018-03-12 RX ADMIN — ASPIRIN 81 MG 81 MG: 81 TABLET ORAL at 08:35

## 2018-03-12 RX ADMIN — Medication 10 ML: at 00:10

## 2018-03-12 NOTE — PROGRESS NOTES
Bedside shift change report given to Jacey Phillips RN (oncoming nurse) by Mike Charles RN (offgoing nurse). Report included the following information SBAR, Kardex, Intake/Output, MAR, Accordion, Recent Results and Cardiac Rhythm A fib PVC.

## 2018-03-12 NOTE — PROGRESS NOTES
2200 Patient attempted to get up to use bathroom. Reminded patient of condom cath. Patient settled back in bed    2245 Patient again attempted to get up to use bathroom. Reminded again of condom cath. Patient thanked this nurse and settled back in bed.     2300 Patient's bed alarm going off. Entered patient's room. Patient had pulled condom cath off and was removing wet brief. Changed patient's brief and bed pad. Placed new condom cath on patient and made comfortable in bed. 0000 Patient attempting to get out of bed. Wanting to call wife to make sure she is ready for dialysis. Explained that it's midnight and we can call her in the morning. Patient assisted back to bed.    0130 Patient attempting to get out of bed to urinate. Reminded patient again about condom catheter and showed where tubing and bag were. Patient placed comfortably back in bed with call bell within reach. Bedside shift change report given to Ronaldo Zaidi RN (oncoming nurse) by Edu Cox RN (offgoing nurse). Report included the following information SBAR.

## 2018-03-12 NOTE — PROGRESS NOTES
Problem: Falls - Risk of  Goal: *Absence of Falls  Document [place] Fall Risk and appropriate interventions in the flowsheet.    Outcome: Progressing Towards Goal  Fall Risk Interventions:  Mobility Interventions: Bed/chair exit alarm, Assess mobility with egress test, Communicate number of staff needed for ambulation/transfer, Patient to call before getting OOB, PT Consult for mobility concerns, Utilize walker, cane, or other assitive device    Mentation Interventions: Adequate sleep, hydration, pain control, Bed/chair exit alarm, Door open when patient unattended, Increase mobility, More frequent rounding, Reorient patient, Room close to nurse's station, Toileting rounds    Medication Interventions: Bed/chair exit alarm, Evaluate medications/consider consulting pharmacy, Patient to call before getting OOB, Teach patient to arise slowly    Elimination Interventions: Call light in reach, Elevated toilet seat, Bed/chair exit alarm, Patient to call for help with toileting needs, Toileting schedule/hourly rounds, Urinal in reach    History of Falls Interventions: Consult care management for discharge planning, Door open when patient unattended, Room close to nurse's station, Bed/chair exit alarm

## 2018-03-12 NOTE — PROGRESS NOTES
Problem: Falls - Risk of  Goal: *Absence of Falls  Document [place] Fall Risk and appropriate interventions in the flowsheet.    Outcome: Progressing Towards Goal  Fall Risk Interventions:  Mobility Interventions: Assess mobility with egress test, Bed/chair exit alarm, Communicate number of staff needed for ambulation/transfer, OT consult for ADLs, Patient to call before getting OOB, PT Consult for mobility concerns, PT Consult for assist device competence, Strengthening exercises (ROM-active/passive), Utilize walker, cane, or other assitive device, Utilize gait belt for transfers/ambulation    Mentation Interventions: Adequate sleep, hydration, pain control, Bed/chair exit alarm, Door open when patient unattended, Evaluate medications/consider consulting pharmacy, Familiar objects from home, Gait belt with transfers/ambulation, Increase mobility, More frequent rounding, Reorient patient, Room close to nurse's station, Self-releasing belt, Toileting rounds, Update white board    Medication Interventions: Assess postural VS orthostatic hypotension, Bed/chair exit alarm, Evaluate medications/consider consulting pharmacy, Patient to call before getting OOB, Teach patient to arise slowly, Utilize gait belt for transfers/ambulation    Elimination Interventions: Bed/chair exit alarm, Call light in reach, Patient to call for help with toileting needs, Toilet paper/wipes in reach, Toileting schedule/hourly rounds, Urinal in reach    History of Falls Interventions: Bed/chair exit alarm, Consult care management for discharge planning, Door open when patient unattended, Evaluate medications/consider consulting pharmacy, Investigate reason for fall, Room close to nurse's station, Utilize gait belt for transfer/ambulation

## 2018-03-12 NOTE — PROGRESS NOTES
2200 Patient attempted to get up to use bathroom. Reminded patient of condom cath. Patient settled back in bed    2245 Patient again attempted to get up to use bathroom. Reminded again of condom cath. Patient thanked this nurse and settled back in bed.     2300 Patient's bed alarm going off. Entered patient's room. Patient had pulled condom cath off and was removing wet brief. Changed patient's brief and bed pad. Placed new condom cath on patient and made comfortable in bed.

## 2018-03-12 NOTE — PROGRESS NOTES
CM met with pt to introduce him to the role of CM and transition of care. He verbalized understanding. This pt lives at home with his wife. He stated that he uses a walker at home and has been using [facility] for his home health. In addition to the home health, this pt has an agency called [employer] (through the [place]) assist him with cooking, cleaning and other chores. CM informed the pt that the therapy team is recommending SNF vs 24hr supervision. The pt adamantly stated that he is going home at d/c and would like to continue with his home health through [facility]. CM will follow.  [address] Management Interventions  PCP Verified by CM: No  Palliative Care Criteria Met (RRAT>21 & CHF Dx)?: No  Transition of Care Consult (CM Consult): Discharge Planning  MyChart Signup: No  Discharge Durable Medical Equipment: No  Physical Therapy Consult: Yes  Occupational Therapy Consult: Yes  Speech Therapy Consult: No  Current Support Network: Lives with Spouse  Confirm Follow Up Transport: Family  Plan discussed with Pt/Family/Caregiver: Yes  Freedom of Choice Offered: Yes  [address] Provided?: No

## 2018-03-12 NOTE — PROGRESS NOTES
Bedside shift change report given to JENNIFER Worthy (oncoming nurse) by Ambrose Ambrosio (offgoing nurse). Report included the following information SBAR, Kardex, Intake/Output, MAR, Recent Results and Cardiac Rhythm AFib with PVCs.

## 2018-03-12 NOTE — INTERDISCIPLINARY ROUNDS
IDR/SLIDR Summary          Patient: Pradeep Benoit MRN: [id number]    Age: 80 y.o. YOB: 1931 Room/Bed: [facility]   Admit Diagnosis: Chest pain  SOB (shortness of breath)  Principal Diagnosis: SOB (shortness of breath)   Goals: mobility, safety, discharge planning  Readmission: YES  Quality Measure: CHF  VTE Prophylaxis: Chemical  Influenza Vaccine screening completed? YES  Pneumococcal Vaccine screening completed? NO  Mobility needs: Yes   Nutrition plan:Yes  Consults:P.T, O.T., Respiratory and Case Management    Financial concerns:No  Escalated to CM? YES  RRAT Score: 18   Interventions:TBD  Testing due for pt today?  YES  LOS: 4 days Expected length of stay 4-5 days  Discharge plan: Home   PCP: Crystal Raines MD  Transportation needs: Yes    Days before discharge:two or more days before discharge   Discharge disposition: Home    Signed:     Mariaelena Soriano RN  3/12/2018  2:21 AM

## 2018-03-12 NOTE — PROGRESS NOTES
I have read and agree with the documentation of Adithya Hobbs [facility]

## 2018-03-12 NOTE — PROGRESS NOTES
Problem: Mobility Impaired (Adult and Pediatric)  Goal: *Acute Goals and Plan of Care (Insert Text)  Physical Therapy Goals  Initiated 3/11/2018  1. Patient will move from supine to sit and sit to supine  in bed with supervision/set-up within 7 day(s). 2.  Patient will transfer from bed to chair and chair to bed with supervision/set-up using the least restrictive device within 7 day(s). 3.  Patient will perform sit to stand with supervision/set-up within 7 day(s). 4.  Patient will ambulate with supervision/set-up for 50 feet with the least restrictive device within 7 day(s). 5.  Patient will ascend/descend 5 stairs with handrail(s) per home needs with minimal assistance/contact guard assist within 7 day(s). physical Therapy TREATMENT  Patient: Aguila Calderon (17 y.o. male)  Date: 3/12/2018  Diagnosis: Chest pain  SOB (shortness of breath) SOB (shortness of breath)       Precautions: Fall, Bed Alarm (AMS)  Chart, physical therapy assessment, plan of care and goals were reviewed. ASSESSMENT:  Cleared for mobility progression by RN, received supine in bed with HOB elevated - agreeable to participation in session. Remains intermittently very confused despite constant reorientation and redirection - perseverative at times on \"needing to close the window to fix the draft\". Able to tolerate increased gait distance today utilizing RW with up to min A for path navigation and turn management - demos L leading ?antalgic, step to pattern - cues for upright posture and increased R LE step lengths. Completed repeated sit<>stands for functional strengthening with occasioanl cues for correct hand placement sequencing. Pt remained up in chair at end of session, NAD. Will need 6MWT prior to discharge. Recommending discharge to SNF.      Progression toward goals:  []    Improving appropriately and progressing toward goals  [x]    Improving slowly and progressing toward goals  []    Not making progress toward goals and plan of care will be adjusted     PLAN:  Patient continues to benefit from skilled intervention to address the above impairments. Continue treatment per established plan of care. Discharge Recommendations:  Skilled Nursing Facility  Further Equipment Recommendations for Discharge:  TBD     SUBJECTIVE:   Patient stated I need to close that window.     OBJECTIVE DATA SUMMARY:   Critical Behavior:  Neurologic State: Alert, Confused, Eyes open spontaneously  Orientation Level: Oriented to person, Oriented to place, Oriented to situation, Disoriented to time  Cognition: Appropriate for age attention/concentration, Follows commands, Impaired decision making, Impulsive, Poor safety awareness  Safety/Judgement: Decreased insight into deficits, Decreased awareness of need for safety, Decreased awareness of need for assistance, Decreased awareness of environment  Functional Mobility Training:  Bed Mobility:     Supine to Sit: Additional time;Stand-by assistance     Transfers:  Sit to Stand: Assist x1;Additional time;Contact guard assistance  Stand to Sit: Contact guard assistance     Balance:  Sitting: Intact  Sitting - Static: Good (unsupported)  Sitting - Dynamic: Good (unsupported)  Standing: Impaired; Without support  Standing - Static: Good;Constant support  Standing - Dynamic : Fair  Ambulation/Gait Training:  Distance (ft): 80 Feet (ft)  Assistive Device: Gait belt;Walker, rolling  Ambulation - Level of Assistance: Minimal assistance;Contact guard assistance  Gait Abnormalities: Altered arm swing; Antalgic;Decreased step clearance;Shuffling gait; Step to gait   Base of Support: Center of gravity altered;Shift to left  Speed/Phyllis: Pace decreased (<100 feet/min); Shuffled  Step Length: Right shortened    Pain:  Pain Scale 1: Numeric (0 - 10)  Pain Intensity 1: 0         Activity Tolerance:   NAD    Please refer to the flowsheet for vital signs taken during this treatment.   After treatment:   [x]    Patient left in no apparent distress sitting up in chair  []    Patient left in no apparent distress in bed  [x]    Call bell left within reach  [x]    Nursing notified  []    Caregiver present  [x]    Chair alarm activated    COMMUNICATION/COLLABORATION:   The patients plan of care was discussed with: Registered Nurse    Kael Hayward, PT, DPT   Time Calculation: 23 mins

## 2018-03-12 NOTE — PROGRESS NOTES
[facility] Progress Note    3/12/2018 9:30 AM  Admit Date: 3/9/2018  Admit Diagnosis: Chest pain;SOB (shortness of breath)    Assessment/Plan     1. Acute on chronic systolic heart failure - LVEF 20% by TTE, NYHA class IV on admission and class III today  -continue diuresis with bumex 1mg PO BID, better compensated now  -continue losartan 12.5mg daily and continue Toprol XL 50mg daily (limited due to hypotension)   -ordered 6 minute walk prior to discharge to assess functional capacity for CHF  -ordered case management consult for one time home health visit at discharge for CHF  -follow-up with [facility]  2. Afib with RVR - currently rate controlled on Toprol XL, [id number]= 6 (age, CHF, HTN, CVA),  wife/pt refuse [facility] so continue ASA 81 mg daily at discharge    3. Chest pain with elevated troponin - chest pain resolved, troponin elevation non-specific in the setting of CKD and Atrial Fib with RVR, 12 lead EKG with no ischemic changes noted  -continue ASA, statin and Toprol XL   4. Hx of HTN - well controlled on losartan 12.5mg daily and  Toprol XL 50mg daily   5. CKD stage 3 - creatinine stable at 1.2, it was 1.6 at [facility] on 2/4/18    6. Hx of DVT - uncertain date, not on [facility], , no DVT by recent venous duplex   7. ? Hx of AAA per [facility] records  8. Elevated liver enzymes - improved, on statin, management per primary team  9. Hx of TBI - no detail known  10. Hx of CVA - noted on head CT, on ASA and statin   11. Severe MR, mod TR - by TTE, continue bumex  12. Hypokalemia - keep 4-4.5  13. Hypomagnesemia - keep 2-2.2  14.   Advanced directives - currently full code, palliative care consultation pending, patient and wife seem to have little insight into pt's chronic health diseases, concern for end stage CHF with limited management options, patient's wife also refuses SNF or rehab at discharge per case management    Echo 2/18 - LVEF mildly dilated, LVEF 20 %, severe diffuse hypokinesis with regional variations, dilated LA, severe MR, mod TR, AoV sclerosis    Subjective: Harvinder Herbert denies chest pain or dyspnea at rest.  Denies palpitations. Laying down resting comofratbly. Complains of back itching but nothing visible. Will order benadryl prn    Objective:      Physical Exam:  Visit Vitals    /79 (BP 1 Location: Left arm, BP Patient Position: At rest;Supine)    Pulse 85    Temp 97.9 °F (36.6 °C)    Resp 17    Ht 5' 9\" (1.753 m)    Wt 181 lb 7 oz (82.3 kg)    SpO2 98%    BMI 26.79 kg/m2     General Appearance:  Well developed, well nourished, alert and oriented x 3, in no acute distress. Ears/Nose/Mouth/Throat:   Hearing grossly normal.         Neck: Supple. Chest:   Coarse breath sounds bilaterally. Cardiovascular:  Irregular rate and rhythm, S1, S2 normal, 2/6 BERNADETTE    Abdomen:   Soft, non-tender, bowel sounds are active. Extremities: Trace LE edema     Skin: Warm and dry.            Telemetry: AFIB with PVCs, having some 4 beat runs of NSVT    Data Review:   Labs:    Recent Results (from the past 24 hour(s))   METABOLIC PANEL, BASIC    Collection Time: 03/12/18  3:05 AM   Result Value Ref Range    Sodium 143 136 - 145 mmol/L    Potassium 3.7 3.5 - 5.1 mmol/L    Chloride 112 (H) 97 - 108 mmol/L    CO2 20 (L) 21 - 32 mmol/L    Anion gap 11 5 - 15 mmol/L    Glucose 112 (H) 65 - 100 mg/dL    BUN 22 (H) 6 - 20 MG/DL    Creatinine 1.54 (H) 0.70 - 1.30 MG/DL    BUN/Creatinine ratio 14 12 - 20      GFR est AA 52 (L) >60 ml/min/1.73m2    GFR est non-AA 43 (L) >60 ml/min/1.73m2    Calcium 8.5 8.5 - 10.1 MG/DL           Current Facility-Administered Medications   Medication Dose Route Frequency    potassium chloride SR (KLOR-CON 10) tablet 30 mEq  30 mEq Oral DAILY    aspirin chewable tablet 81 mg  81 mg Oral DAILY    atorvastatin (LIPITOR) tablet 40 mg  40 mg Oral DAILY    cyanocobalamin (VITAMIN B12) tablet 1,000 mcg  1,000 mcg Oral DAILY    finasteride (PROSCAR) tablet 5 mg  5 mg Oral DAILY    losartan (COZAAR) tablet 12.5 mg  12.5 mg Oral DAILY    metoprolol succinate (TOPROL-XL) XL tablet 50 mg  50 mg Oral DAILY    multivitamin, tx-iron-ca-min (THERA-M w/ IRON) tablet 1 Tab  1 Tab Oral DAILY    tamsulosin (FLOMAX) capsule 0.4 mg  0.4 mg Oral DAILY    sodium chloride (NS) flush 5-10 mL  5-10 mL IntraVENous Q8H    sodium chloride (NS) flush 5-10 mL  5-10 mL IntraVENous PRN    nitroglycerin (NITROSTAT) tablet 0.4 mg  0.4 mg SubLINGual Q5MIN PRN    heparin (porcine) injection 5,000 Units  5,000 Units SubCUTAneous Q8H    bumetanide (BUMEX) injection 1 mg  1 mg IntraVENous BID       Stephany Connelly MD  [facility] [address] [address]  [facility], [address]  [phone]     [facility] follow-up

## 2018-03-12 NOTE — NURSE NAVIGATOR
Chart reviewed by Heart Failure Nurse Navigator. Heart Failure database completed. EF 20%. ACEi/ARB: Cozaar. BB:Toprol XL. CRT Not indicated. NYHA Functional Class IV. Heart Failure Teach Back in Patient Education. Heart Failure Avoiding Triggers on Discharge Instructions. Outpatient nurse navigator notified of admission     Heart Failure readmit - Prior admit 2/8 to 2/13.

## 2018-03-13 VITALS
WEIGHT: 174.7 LBS | HEIGHT: 69 IN | SYSTOLIC BLOOD PRESSURE: 139 MMHG | BODY MASS INDEX: 25.87 KG/M2 | HEART RATE: 84 BPM | TEMPERATURE: 96.8 F | DIASTOLIC BLOOD PRESSURE: 83 MMHG | OXYGEN SATURATION: 100 % | RESPIRATION RATE: 20 BRPM

## 2018-03-13 PROBLEM — R06.02 SHORTNESS OF BREATH: Status: ACTIVE | Noted: 2018-03-13

## 2018-03-13 LAB
ANION GAP SERPL CALC-SCNC: 13 MMOL/L (ref 5–15)
BASOPHILS # BLD: 0.1 K/UL (ref 0–0.1)
BASOPHILS NFR BLD: 1 % (ref 0–1)
BUN SERPL-MCNC: 21 MG/DL (ref 6–20)
BUN/CREAT SERPL: 13 (ref 12–20)
CALCIUM SERPL-MCNC: 8.4 MG/DL (ref 8.5–10.1)
CHLORIDE SERPL-SCNC: 113 MMOL/L (ref 97–108)
CO2 SERPL-SCNC: 17 MMOL/L (ref 21–32)
CREAT SERPL-MCNC: 1.57 MG/DL (ref 0.7–1.3)
DIFFERENTIAL METHOD BLD: ABNORMAL
EOSINOPHIL # BLD: 0.1 K/UL (ref 0–0.4)
EOSINOPHIL NFR BLD: 1 % (ref 0–7)
ERYTHROCYTE [DISTWIDTH] IN BLOOD BY AUTOMATED COUNT: 18.6 % (ref 11.5–14.5)
GLUCOSE SERPL-MCNC: 94 MG/DL (ref 65–100)
HCT VFR BLD AUTO: 33.6 % (ref 36.6–50.3)
HGB BLD-MCNC: 10.8 G/DL (ref 12.1–17)
IMM GRANULOCYTES # BLD: 0.1 K/UL (ref 0–0.04)
IMM GRANULOCYTES NFR BLD AUTO: 1 % (ref 0–0.5)
LYMPHOCYTES # BLD: 1 K/UL (ref 0.8–3.5)
LYMPHOCYTES NFR BLD: 13 % (ref 12–49)
MAGNESIUM SERPL-MCNC: 1.7 MG/DL (ref 1.6–2.4)
MCH RBC QN AUTO: 33.1 PG (ref 26–34)
MCHC RBC AUTO-ENTMCNC: 32.1 G/DL (ref 30–36.5)
MCV RBC AUTO: 103.1 FL (ref 80–99)
MONOCYTES # BLD: 0.8 K/UL (ref 0–1)
MONOCYTES NFR BLD: 10 % (ref 5–13)
NEUTS SEG # BLD: 5.9 K/UL (ref 1.8–8)
NEUTS SEG NFR BLD: 74 % (ref 32–75)
NRBC # BLD: 0.04 K/UL (ref 0–0.01)
NRBC BLD-RTO: 0.5 PER 100 WBC
PLATELET # BLD AUTO: 142 K/UL (ref 150–400)
PLATELET COMMENTS,PCOM: ABNORMAL
PMV BLD AUTO: 12.8 FL (ref 8.9–12.9)
POTASSIUM SERPL-SCNC: 4 MMOL/L (ref 3.5–5.1)
RBC # BLD AUTO: 3.26 M/UL (ref 4.1–5.7)
RBC MORPH BLD: ABNORMAL
SODIUM SERPL-SCNC: 143 MMOL/L (ref 136–145)
WBC # BLD AUTO: 8 K/UL (ref 4.1–11.1)

## 2018-03-13 PROCEDURE — 97116 GAIT TRAINING THERAPY: CPT

## 2018-03-13 PROCEDURE — 77010033678 HC OXYGEN DAILY

## 2018-03-13 PROCEDURE — 96372 THER/PROPH/DIAG INJ SC/IM: CPT

## 2018-03-13 PROCEDURE — 74011250637 HC RX REV CODE- 250/637: Performed by: FAMILY MEDICINE

## 2018-03-13 PROCEDURE — 65390000012 HC CONDITION CODE 44 OBSERVATION

## 2018-03-13 PROCEDURE — 97530 THERAPEUTIC ACTIVITIES: CPT

## 2018-03-13 PROCEDURE — 36415 COLL VENOUS BLD VENIPUNCTURE: CPT | Performed by: INTERNAL MEDICINE

## 2018-03-13 PROCEDURE — 96376 TX/PRO/DX INJ SAME DRUG ADON: CPT

## 2018-03-13 PROCEDURE — 74011250637 HC RX REV CODE- 250/637: Performed by: HOSPITALIST

## 2018-03-13 PROCEDURE — 74011000250 HC RX REV CODE- 250: Performed by: FAMILY MEDICINE

## 2018-03-13 PROCEDURE — 99218 HC RM OBSERVATION: CPT

## 2018-03-13 PROCEDURE — 74011250636 HC RX REV CODE- 250/636: Performed by: FAMILY MEDICINE

## 2018-03-13 PROCEDURE — 85025 COMPLETE CBC W/AUTO DIFF WBC: CPT | Performed by: FAMILY MEDICINE

## 2018-03-13 PROCEDURE — 83735 ASSAY OF MAGNESIUM: CPT | Performed by: INTERNAL MEDICINE

## 2018-03-13 PROCEDURE — 97535 SELF CARE MNGMENT TRAINING: CPT

## 2018-03-13 PROCEDURE — 80048 BASIC METABOLIC PNL TOTAL CA: CPT | Performed by: INTERNAL MEDICINE

## 2018-03-13 RX ORDER — POTASSIUM CHLORIDE 750 MG/1
30 TABLET, FILM COATED, EXTENDED RELEASE ORAL DAILY
Qty: 30 TAB | Refills: 0 | Status: SHIPPED | OUTPATIENT
Start: 2018-03-14

## 2018-03-13 RX ADMIN — Medication 10 ML: at 14:00

## 2018-03-13 RX ADMIN — LOSARTAN POTASSIUM 12.5 MG: 25 TABLET ORAL at 09:08

## 2018-03-13 RX ADMIN — BUMETANIDE 1 MG: 0.25 INJECTION INTRAMUSCULAR; INTRAVENOUS at 09:07

## 2018-03-13 RX ADMIN — Medication 1000 MCG: at 09:06

## 2018-03-13 RX ADMIN — ASPIRIN 81 MG 81 MG: 81 TABLET ORAL at 09:06

## 2018-03-13 RX ADMIN — TAMSULOSIN HYDROCHLORIDE 0.4 MG: 0.4 CAPSULE ORAL at 09:07

## 2018-03-13 RX ADMIN — HEPARIN SODIUM 5000 UNITS: 5000 INJECTION, SOLUTION INTRAVENOUS; SUBCUTANEOUS at 09:07

## 2018-03-13 RX ADMIN — MULTIPLE VITAMINS W/ MINERALS TAB 1 TABLET: TAB at 09:06

## 2018-03-13 RX ADMIN — FINASTERIDE 5 MG: 5 TABLET, FILM COATED ORAL at 09:06

## 2018-03-13 RX ADMIN — POTASSIUM CHLORIDE 30 MEQ: 750 TABLET, EXTENDED RELEASE ORAL at 09:05

## 2018-03-13 RX ADMIN — Medication 10 ML: at 05:23

## 2018-03-13 RX ADMIN — ATORVASTATIN CALCIUM 40 MG: 40 TABLET, FILM COATED ORAL at 09:06

## 2018-03-13 RX ADMIN — METOPROLOL SUCCINATE 50 MG: 50 TABLET, EXTENDED RELEASE ORAL at 09:08

## 2018-03-13 NOTE — PROGRESS NOTES
Hospitalist Progress Note  Mazin Eubanks MD  Answering service: [phone] OR [phone] from in house phone        Date of Service:  3/12/2018  NAME:  Marcelino Juarez  :  1931  MRN:  [id number]      Admission Summary:   81 yo male with PMH significant for chronic systolic heart failure, NYHA Class III - IV, atrial fibrillation, DVT, CKD stage 3, AAA, transaminitis, CVA, recent concussion, and BPH admitted on 3/9 due to CP, inability to lie flat, progressive SOB and cough with suspicion for acute CHF. He was recently admitted last month for similar event. Interval history / Subjective:   Pt seen and examined this pm. Pt was seen just finishing lunch. Pt states he has no complaints today and he is feeling much better. Denies any chest pain or SOB. Assessment & Plan:     1. Acute on chronic systolic heart failure, NYHA Class III (POA):  -Pt is symptomatically improved. -Recent echo with EF 20% and diffuse hypokinesis -> no need to repeat  -Continue all current cardiac medications  -Continue IV diuresis with Bumex. Troponins flat    2. Hypokalemia (POA):  -Now resolved. 3. Hypomagnesia (POA):  -Now resolved. 4. Atrial fibrillation:  -Rate controlled presently with Toprol-XL  -No OAC due to fall risk    5. Recent diarrhea:  -None in last several days per wife    6. H/O BPH:  -Continue Flomax    7. CKD, stage III:  -Stable at baseline    Code status: Full  DVT prophylaxis: Heparin SQ    Care Plan discussed with: Patient, RN, Wife  Disposition: Home. They are not interested in rehab at all.  HHT already arranged from previous admissions     Hospital Problems  Date Reviewed: 3/9/2018          Codes Class Noted POA    Chest pain ICD-10-CM: R07.9  ICD-9-CM: 786.50  3/9/2018 Unknown        * (Principal)SOB (shortness of breath) ICD-10-CM: R06.02  ICD-9-CM: 786.05  2018 Unknown                Review of Systems:   Denied CP or SOB at present, no abdominal pain, + hungry       Vital Signs:    Last 24hrs VS reviewed since prior progress note. Most recent are:  Visit Vitals    /89 (BP 1 Location: Left leg, BP Patient Position: At rest)    Pulse 89    Temp 97.8 °F (36.6 °C)    Resp 18    Ht 5' 9\" (1.753 m)    Wt 82.3 kg (181 lb 7 oz)    SpO2 100%    BMI 26.79 kg/m2         Intake/Output Summary (Last 24 hours) at 03/12/18 2030  Last data filed at 03/12/18 2027   Gross per 24 hour   Intake              770 ml   Output              575 ml   Net              195 ml        Physical Examination:             Constitutional:  No acute distress, cooperative, pleasant    ENT:  Oral mucous moist, oropharynx benign. Neck supple,    Resp:  CTA bilaterally. No wheezing/rhonchi/rales. No accessory muscle use   CV:  Irregular rhythm, normal rate, no murmurs, gallops, rubs    GI:  Soft, non distended, non tender. normoactive bowel sounds, no hepatosplenomegaly     Musculoskeletal:  Trace pedal edema, warm, 2+ pulses throughout. BOWLING    Neurologic:  Moves all extremities. AAOx3, CN II-XII reviewed.  Occasional confusion     Psych:  Questionable insight  Skin:  Good turgor, no rashes or ulcers       Data Review:    Review and/or order of clinical lab test  Review and/or order of tests in the radiology section of CPT  Review and/or order of tests in the medicine section of CPT  Decision to obtain old records and/or obtain history from someone other than the patient      Labs:     Recent Labs      03/11/18   0301  03/10/18   0532   WBC  5.9  6.5   HGB  10.0*  9.8*   HCT  30.2*  29.8*   PLT  138*  140*     Recent Labs      03/12/18   0305  03/11/18   0301  03/10/18   2152   03/10/18   0532  03/09/18   2046   NA  143  143   --    --   142  143   K  3.7  3.2*  3.4*   < >  2.9*  3.1*   CL  112*  112*   --    --   111*  110*   CO2  20*  21   --    --   18*  21   BUN  22*  22*   --    --   20  22*   CREA  1.54*  1.34*   --    --   1.30  1.53*   GLU  112*  105* --    --   107*  102*   CA  8.5  8.2*   --    --   7.8*  8.0*   MG   --   2.1  2.1   --    --   1.3*   PHOS   --    --   3.0   --    --    --     < > = values in this interval not displayed.      Recent Labs      03/09/18 2046   SGOT  31   ALT  22   AP  147*   TBILI  0.9   TP  8.3*   ALB  3.2*   GLOB  5.1*       Recent Labs      03/10/18   0532  03/10/18   0020  03/09/18 2046   TROIQ  <0.04  0.05*  0.06*     Lab Results   Component Value Date/Time    Cholesterol, total 116 02/08/2018 06:05 PM    HDL Cholesterol 35 02/08/2018 06:05 PM    LDL, calculated 63.6 02/08/2018 06:05 PM    Triglyceride 87 02/08/2018 06:05 PM    CHOL/HDL Ratio 3.3 02/08/2018 06:05 PM     Lab Results   Component Value Date/Time    Glucose (POC) 116 (H) 03/11/2018 02:59 AM         Medications Reviewed:     Current Facility-Administered Medications   Medication Dose Route Frequency    diphenhydrAMINE (BENADRYL) capsule 25 mg  25 mg Oral Q6H PRN    potassium chloride SR (KLOR-CON 10) tablet 30 mEq  30 mEq Oral DAILY    aspirin chewable tablet 81 mg  81 mg Oral DAILY    atorvastatin (LIPITOR) tablet 40 mg  40 mg Oral DAILY    cyanocobalamin (VITAMIN B12) tablet 1,000 mcg  1,000 mcg Oral DAILY    finasteride (PROSCAR) tablet 5 mg  5 mg Oral DAILY    losartan (COZAAR) tablet 12.5 mg  12.5 mg Oral DAILY    metoprolol succinate (TOPROL-XL) XL tablet 50 mg  50 mg Oral DAILY    multivitamin, tx-iron-ca-min (THERA-M w/ IRON) tablet 1 Tab  1 Tab Oral DAILY    tamsulosin (FLOMAX) capsule 0.4 mg  0.4 mg Oral DAILY    sodium chloride (NS) flush 5-10 mL  5-10 mL IntraVENous Q8H    sodium chloride (NS) flush 5-10 mL  5-10 mL IntraVENous PRN    nitroglycerin (NITROSTAT) tablet 0.4 mg  0.4 mg SubLINGual Q5MIN PRN    heparin (porcine) injection 5,000 Units  5,000 Units SubCUTAneous Q8H    bumetanide (BUMEX) injection 1 mg  1 mg IntraVENous BID     ______________________________________________________________________  EXPECTED LENGTH OF STAY: 4d 12h  ACTUAL LENGTH OF STAY:          3 days                 Mazin Eubanks MD

## 2018-03-13 NOTE — DISCHARGE SUMMARY
Discharge Summary       PATIENT ID: Sammi Rodrigues  MRN: [id number]   YOB: 1931    DATE OF ADMISSION: 3/9/2018  8:31 PM    DATE OF DISCHARGE: 3/14/2018   PRIMARY CARE PROVIDER: Philip Ventura MD     ATTENDING PHYSICIAN: Natalia Lynch MD  DISCHARGING PROVIDER: Artemio Evans MD    To contact this individual call [phone] and ask the  to page. If unavailable ask to be transferred the Adult Hospitalist Department. CONSULTATIONS: IP CONSULT TO HOSPITALIST  IP CONSULT TO CARDIOLOGY    PROCEDURES/SURGERIES: * No surgery found *    ADMITTING DIAGNOSES & HOSPITAL COURSE:     A 81 yo male with PMH significant for chronic systolic heart failure, NYHA Class III - IV, atrial fibrillation, DVT, CKD stage 3, AAA, transaminitis, CVA, recent concussion, and BPH admitted on 3/9 due to CP, inability to lie flat, progressive SOB and cough with suspicion for acute CHF.  He was recently admitted last month for similar event. Acute on chronic systolic heart failure, NYHA Class III (POA):  -Pt is symptomatically improved. -Recent echo with EF 20% and diffuse hypokinesis -> no need to repeat  -on Bumex. Metoprolol and losartan  -patient and wife non complaint  -cardiologist on board   Hypokalemia (POA):  -Now resolved.    Hypomagnesia (POA):  -Now resolved. Atrial fibrillation:  -Rate controlled presently with Toprol-XL  -No OAC due to fall risk  Recent diarrhea:  -None in last several days per wife   Hx of BPH:  -Continue Flomax   CKD, stage III:  -Stable at baseline  Hx of TBI   -patient conscious and alert, disoriented,   -continue supportive care       Code status: Full  DVT prophylaxis: Heparin SQ       DISCHARGE DIAGNOSES / PLAN:      Acute on chronic systolic heart failure, NYHA Class III (POA):  -continue Bumex, Metoprolol and losartan  -outpatient follow up with cardiologist  Hypokalemia (POA):  -Now resolved.    Hypomagnesia (POA):  -Now resolved.   Atrial fibrillation:  -continue Toprol-XL and aspirin  -No OAC due to fall risk  Recent diarrhea:  -resolved  Hx of BPH:  -Continue Flomax   CKD, stage III:  -Stable at baseline  Hx of TBI   -patient conscious and alert, disoriented,   -continue supportive care         PENDING TEST RESULTS:   At the time of discharge the following test results are still pending: none    FOLLOW UP APPOINTMENTS:    Follow-up Information     Follow up With Details Comments Contact Info   1. [facility] PT, OT and SN/Please call this agency when you get home. [address], [facility] [address] [place] [address]  [phone]     2. Follow up with Rudy Olivas MD on 3/16/2018 at 11:00 amd  3. Follow up with your Primary Care Physician in 2-3 days  4. Follow up with your Cardiologist at [place] in 2-3 days    ADDITIONAL CARE RECOMMENDATIONS:     DIET: Cardiac Diet    ACTIVITY: Activity as tolerated    WOUND CARE: none    EQUIPMENT needed: none      DISCHARGE MEDICATIONS:  Current Discharge Medication List      START taking these medications    Details   potassium chloride SR (KLOR-CON 10) 10 mEq tablet Take 3 Tabs by mouth daily. Qty: 30 Tab, Refills: 0         CONTINUE these medications which have NOT CHANGED    Details   bumetanide (BUMEX) 1 mg tablet Take 1 Tab by mouth two (2) times a day. Qty: 60 Tab, Refills: 1      losartan (COZAAR) 25 mg tablet Take 0.5 Tabs by mouth daily. Qty: 30 Tab, Refills: 1      metoprolol succinate (TOPROL-XL) 50 mg XL tablet Take 1 Tab by mouth daily. Qty: 30 Tab, Refills: 1      aspirin 81 mg chewable tablet Take 81 mg by mouth daily. atorvastatin (LIPITOR) 80 mg tablet Take 40 mg by mouth daily. cyanocobalamin 1,000 mcg tablet Take 1,000 mcg by mouth daily. finasteride (PROSCAR) 5 mg tablet Take 5 mg by mouth daily. MULTIVIT WITH IRON,MINERALS (MULTIVITAMIN AND MINERALS PO) Take 1 Tab by mouth daily. tamsulosin (FLOMAX) 0.4 mg capsule Take 0.4 mg by mouth daily. NOTIFY YOUR PHYSICIAN FOR ANY OF THE FOLLOWING:   Fever over 101 degrees for 24 hours. Chest pain, shortness of breath, fever, chills, nausea, vomiting, diarrhea, change in mentation, falling, weakness, bleeding. Severe pain or pain not relieved by medications. Or, any other signs or symptoms that you may have questions about.     DISPOSITION:  x  Home With:  x OT x PT x HH x RN       Long term SNF/Inpatient Rehab    Independent/assisted living    Hospice    Other:       PATIENT CONDITION AT DISCHARGE:     Functional status    Poor    x Deconditioned     Independent      Cognition     Lucid    x Forgetful     Dementia      Catheters/lines (plus indication)    Diaz     PICC     PEG    x None      Code status   x  Full code     DNR      PHYSICAL EXAMINATION AT DISCHARGE:   Refer to Progress Note      CHRONIC MEDICAL DIAGNOSES:  Problem List as of 3/13/2018  Date Reviewed: 3/9/2018          Codes Class Noted - Resolved    Shortness of breath ICD-10-CM: R06.02  ICD-9-CM: 786.05  3/13/2018 - Present        Chest pain ICD-10-CM: R07.9  ICD-9-CM: 786.50  3/9/2018 - Present        * (Principal)SOB (shortness of breath) ICD-10-CM: R06.02  ICD-9-CM: 786.05  2/8/2018 - Present              Greater than 35 minutes were spent with the patient on counseling and coordination of care    Signed:   Lenin Rojas MD  3/13/2018  4:28 PM

## 2018-03-13 NOTE — PROGRESS NOTES
0800: Bedside shift change report given to Va (oncoming nurse) by Kelby Torres (offgoing nurse). Report included the following information SBAR, Kardex, Intake/Output, MAR and Recent Results.

## 2018-03-13 NOTE — PROGRESS NOTES
Pharmacist Discharge Medication Reconciliation    Discharging Provider: Dr. Jose Guadalupe Melgar PMH:   Past Medical History:   Diagnosis Date    Arthritis     Heart failure ([facility] [address]     Hypertension      Chief Complaint for this Admission:   Chief Complaint   Patient presents with    Shortness of Breath    Diarrhea     Allergies: Review of patient's allergies indicates no known allergies. Discharge Medications:   Current Discharge Medication List        START taking these medications    Details   potassium chloride SR (KLOR-CON 10) 10 mEq tablet Take 3 Tabs by mouth daily. Qty: 30 Tab, Refills: 0           CONTINUE these medications which have NOT CHANGED    Details   bumetanide (BUMEX) 1 mg tablet Take 1 Tab by mouth two (2) times a day. Qty: 60 Tab, Refills: 1      losartan (COZAAR) 25 mg tablet Take 0.5 Tabs by mouth daily. Qty: 30 Tab, Refills: 1      metoprolol succinate (TOPROL-XL) 50 mg XL tablet Take 1 Tab by mouth daily. Qty: 30 Tab, Refills: 1      aspirin 81 mg chewable tablet Take 81 mg by mouth daily. atorvastatin (LIPITOR) 80 mg tablet Take 40 mg by mouth daily. cyanocobalamin 1,000 mcg tablet Take 1,000 mcg by mouth daily. finasteride (PROSCAR) 5 mg tablet Take 5 mg by mouth daily. MULTIVIT WITH IRON,MINERALS (MULTIVITAMIN AND MINERALS PO) Take 1 Tab by mouth daily. tamsulosin (FLOMAX) 0.4 mg capsule Take 0.4 mg by mouth daily. The patient's chart, MAR and AVS were reviewed by Kathy Mcmanus.

## 2018-03-13 NOTE — PROGRESS NOTES
DORITA received home health orders for this pt and sent this referral to [facility]. DORITA also called [facility] and spoke with Erica. She confirmed that this pt is active with their agency. Pt is discharged to home today and will be picked up by a friend. Per Erica at [facility], she has accepted this pt for home care.  Ana Rosa Montgomery

## 2018-03-13 NOTE — PROGRESS NOTES
[facility] Progress Note    3/13/2018 9:30 AM  Admit Date: 3/9/2018  Admit Diagnosis: Chest pain;SOB (shortness of breath); Shortness of breath      Stable to dc but seems high risk for readmission as neither pt nor wife have insight into illness. Would be best served by NH/placement and palliative/dnr. Pt ot able to make decisions and wife is resistant to discussing his health needs. Assessment/Plan     1. Acute on chronic systolic heart failure - LVEF 20% by TTE, NYHA class IV on admission and class III today  -continue diuresis with bumex 1mg PO BID, better compensated now  -continue losartan 12.5mg daily and continue Toprol XL 50mg daily (limited due to hypotension)   -ordered 6 minute walk prior to discharge to assess functional capacity for CHF  -ordered case management consult for one time home health visit at discharge for CHF  -follow-up with [facility]  2. Afib with RVR - currently rate controlled on Toprol XL, [id number] 6 (age, CHF, HTN, CVA),  wife/pt refuse [facility] so continue ASA 81 mg daily at discharge    3. Chest pain with elevated troponin - chest pain resolved, troponin elevation non-specific in the setting of CKD and Atrial Fib with RVR, 12 lead EKG with no ischemic changes noted  -continue ASA, statin and Toprol XL   4. Hx of HTN - well controlled on losartan 12.5mg daily and  Toprol XL 50mg daily   5. CKD stage 3 - creatinine stable at 1.2, it was 1.6 at [facility] on 2/4/18    6. Hx of DVT - uncertain date, not on [facility], , no DVT by recent venous duplex   7. ? Hx of AAA per [facility] records  8. Elevated liver enzymes - improved, on statin, management per primary team  9. Hx of TBI - no detail known  10. Hx of CVA - noted on head CT, on ASA and statin   11. Severe MR, mod TR - by TTE, continue bumex  12. Hypokalemia - keep 4-4.5  13. Hypomagnesemia - keep 2-2.2  14.   Advanced directives - currently full code, palliative care consultation pending, patient and wife seem to have little insight into pt's chronic health diseases, concern for end stage CHF with limited management options, patient's wife also refuses SNF or rehab at discharge per case management    Echo 2/18 - LVEF mildly dilated, LVEF 20 %, severe diffuse hypokinesis with regional variations, dilated LA, severe MR, mod TR, AoV sclerosis    Subjective: Jenn Lui denies chest pain or dyspnea at rest.  Denies palpitations. Laying down resting comforatbly. Objective:      Physical Exam:  Visit Vitals    /83 (BP 1 Location: Right arm, BP Patient Position: At rest;Head of bed elevated (Comment degrees))    Pulse 84    Temp 96.8 °F (36 °C)    Resp 20    Ht 5' 9\" (1.753 m)    Wt 174 lb 11.2 oz (79.2 kg)    SpO2 100%    BMI 25.8 kg/m2     General Appearance:  Elderly male in nad. Not oriented   Ears/Nose/Mouth/Throat:   Hearing grossly normal.         Neck: Supple. Chest:   Coarse breath sounds bilaterally. Cardiovascular:  Irregular rate and rhythm, S1, S2 normal, 2/6 BERNADETTE    Abdomen:   Soft, non-tender, bowel sounds are active. Extremities: Trace LE edema     Skin: Warm and dry.            Telemetry: AFIB with PVCs, having some 4 beat runs of NSVT    Data Review:   Labs:    Recent Results (from the past 24 hour(s))   METABOLIC PANEL, BASIC    Collection Time: 03/13/18  3:24 AM   Result Value Ref Range    Sodium 143 136 - 145 mmol/L    Potassium 4.0 3.5 - 5.1 mmol/L    Chloride 113 (H) 97 - 108 mmol/L    CO2 17 (L) 21 - 32 mmol/L    Anion gap 13 5 - 15 mmol/L    Glucose 94 65 - 100 mg/dL    BUN 21 (H) 6 - 20 MG/DL    Creatinine 1.57 (H) 0.70 - 1.30 MG/DL    BUN/Creatinine ratio 13 12 - 20      GFR est AA 51 (L) >60 ml/min/1.73m2    GFR est non-AA 42 (L) >60 ml/min/1.73m2    Calcium 8.4 (L) 8.5 - 10.1 MG/DL   MAGNESIUM    Collection Time: 03/13/18  3:24 AM   Result Value Ref Range    Magnesium 1.7 1.6 - 2.4 mg/dL   CBC WITH AUTOMATED DIFF    Collection Time: 03/13/18  5:30 AM   Result Value Ref Range WBC 8.0 4.1 - 11.1 K/uL    RBC 3.26 (L) 4.10 - 5.70 M/uL    HGB 10.8 (L) 12.1 - 17.0 g/dL    HCT 33.6 (L) 36.6 - 50.3 %    .1 (H) 80.0 - 99.0 FL    MCH 33.1 26.0 - 34.0 PG    MCHC 32.1 30.0 - 36.5 g/dL    RDW 18.6 (H) 11.5 - 14.5 %    PLATELET 830 (L) 819 - 400 K/uL    MPV 12.8 8.9 - 12.9 FL    NRBC 0.5 (H) 0  WBC    ABSOLUTE NRBC 0.04 (H) 0.00 - 0.01 K/uL    NEUTROPHILS 74 32 - 75 %    LYMPHOCYTES 13 12 - 49 %    MONOCYTES 10 5 - 13 %    EOSINOPHILS 1 0 - 7 %    BASOPHILS 1 0 - 1 %    IMMATURE GRANULOCYTES 1 (H) 0.0 - 0.5 %    ABS. NEUTROPHILS 5.9 1.8 - 8.0 K/UL    ABS. LYMPHOCYTES 1.0 0.8 - 3.5 K/UL    ABS. MONOCYTES 0.8 0.0 - 1.0 K/UL    ABS. EOSINOPHILS 0.1 0.0 - 0.4 K/UL    ABS. BASOPHILS 0.1 0.0 - 0.1 K/UL    ABS. IMM.  GRANS. 0.1 (H) 0.00 - 0.04 K/UL    DF SMEAR SCANNED      PLATELET COMMENTS Large Platelets      RBC COMMENTS ANISOCYTOSIS  1+        RBC COMMENTS MACROCYTOSIS  1+        RBC COMMENTS KINSEY CELLS  PRESENT        RBC COMMENTS OVALOCYTES  PRESENT               Current Facility-Administered Medications   Medication Dose Route Frequency    diphenhydrAMINE (BENADRYL) capsule 25 mg  25 mg Oral Q6H PRN    potassium chloride SR (KLOR-CON 10) tablet 30 mEq  30 mEq Oral DAILY    aspirin chewable tablet 81 mg  81 mg Oral DAILY    atorvastatin (LIPITOR) tablet 40 mg  40 mg Oral DAILY    cyanocobalamin (VITAMIN B12) tablet 1,000 mcg  1,000 mcg Oral DAILY    finasteride (PROSCAR) tablet 5 mg  5 mg Oral DAILY    losartan (COZAAR) tablet 12.5 mg  12.5 mg Oral DAILY    metoprolol succinate (TOPROL-XL) XL tablet 50 mg  50 mg Oral DAILY    multivitamin, tx-iron-ca-min (THERA-M w/ IRON) tablet 1 Tab  1 Tab Oral DAILY    tamsulosin (FLOMAX) capsule 0.4 mg  0.4 mg Oral DAILY    sodium chloride (NS) flush 5-10 mL  5-10 mL IntraVENous Q8H    sodium chloride (NS) flush 5-10 mL  5-10 mL IntraVENous PRN    nitroglycerin (NITROSTAT) tablet 0.4 mg  0.4 mg SubLINGual Q5MIN PRN    heparin (porcine) injection 5,000 Units  5,000 Units SubCUTAneous Q8H    bumetanide (BUMEX) injection 1 mg  1 mg IntraVENous BID       Rudy Olivas MD  [facility]  [facility]  [phone] follow-up

## 2018-03-13 NOTE — CARDIO/PULMONARY
Cardiac Rehab: Heart Failure education book given to Atul. Educated using teach back method. Discussed diagnosis definition and assessed patient understanding. Reviewed importance of daily weight monitoring and Low Sodium diet (5900-8285 mg. daily). Encouraged activity and rest periods within symptom limitations and as ordered by physician. Reviewed Bumex, purpose of medication, potential side effects, compliance, and what to do if dose is missed. Discussed importance of reporting signs and symptoms of exacerbation, and when to report them to the doctor, to prevent re-hospitalization. Raffyt was encouraged to keep all appointments with doctor. Smoking history assessed. Patient is a former smoker. Discussed the Cardiac Rehab Program, benefits, format, and encouraged enrollment, when eligible. Patient is interested and we will follow up, by phone, once eligible. Patient said he currently does not have a car but can drive. When following up, please inquire about transportation. JENNIFER Byrd could benefit from further education on the following HF topics: daily weights and diet.

## 2018-03-13 NOTE — PROGRESS NOTES
Hospitalist Progress Note  Joshua Castillo MD  Answering service: [phone] OR 36 from in house phone  Cell: [phone]      Date of Service:  3/13/2018  NAME:  Elvis Rivas  :  1931  MRN:  [id number]      Admission Summary:   A 81 yo male with PMH significant for chronic systolic heart failure, NYHA Class III - IV, atrial fibrillation, DVT, CKD stage 3, AAA, transaminitis, CVA, recent concussion, and BPH admitted on 3/9 due to CP, inability to lie flat, progressive SOB and cough with suspicion for acute CHF. He was recently admitted last month for similar event. Interval history / Subjective:   No acute complaint, no chest pain or shortness of breath     Assessment & Plan:     Acute on chronic systolic heart failure, NYHA Class III (POA):  -Pt is symptomatically improved. -Recent echo with EF 20% and diffuse hypokinesis -> no need to repeat  -Continue all current cardiac medications  -Continue IV diuresis with Bumex.  Metoprolol and losartan  -cardiologist on board, will check about discharge plan     Hypokalemia (POA):  -Now resolved.     Hypomagnesia (POA):  -Now resolved.     Atrial fibrillation:  -Rate controlled presently with Toprol-XL  -No OAC due to fall risk     Recent diarrhea:  -None in last several days per wife     Hx of BPH:  -Continue Flomax     CKD, stage III:  -Stable at baseline    Hx of TBI   -patient conscious and alert, disoriented,   -continue supportive care      Code status: Full  DVT prophylaxis: Heparin SQ      Care Plan discussed with: Patient/Family and Nurse  Disposition: SNF, family refused SNF, home with [facility], PT/OT and [facility] Problems  Date Reviewed: 3/9/2018          Codes Class Noted POA    Shortness of breath ICD-10-CM: R06.02  ICD-9-CM: 786.05  3/13/2018 Unknown        Chest pain ICD-10-CM: R07.9  ICD-9-CM: 786.50  3/9/2018 Unknown        * (Principal)SOB (shortness of breath) ICD-10-CM: R06.02  ICD-9-CM: 786.05  2/8/2018 Unknown                  Vital Signs:    Last 24hrs VS reviewed since prior progress note. Most recent are:  Visit Vitals    /58 (BP 1 Location: Right arm, BP Patient Position: At rest;Head of bed elevated (Comment degrees))    Pulse 83    Temp 97.3 °F (36.3 °C)    Resp 19    Ht 5' 9\" (1.753 m)    Wt 79.2 kg (174 lb 11.2 oz)    SpO2 99%    BMI 25.8 kg/m2         Intake/Output Summary (Last 24 hours) at 03/13/18 1245  Last data filed at 03/13/18 1055   Gross per 24 hour   Intake              300 ml   Output              450 ml   Net             -150 ml        Physical Examination:             Constitutional:  No acute distress, cooperative, pleasant    ENT:  Oral mucous moist, oropharynx benign. Neck supple,    Resp:  CTA bilaterally. No wheezing/rhonchi/rales. No accessory muscle use   CV:  Regular rhythm, normal rate, no murmurs, gallops, rubs    GI:  Soft, non distended, non tender. normoactive bowel sounds, no hepatosplenomegaly     Musculoskeletal:  No edema     Neurologic:  Conscious and alert, answer simple questions and follows command,  disoriented, moves all extremities     Skin:  Good turgor, no rashes or ulcers       Data Review:    Review and/or order of clinical lab test      Labs:     Recent Labs      03/13/18   0530  03/11/18   0301   WBC  8.0  5.9   HGB  10.8*  10.0*   HCT  33.6*  30.2*   PLT  142*  138*     Recent Labs      03/13/18   0324  03/12/18   0305  03/11/18   0301  03/10/18   2152   NA  143  143  143   --    K  4.0  3.7  3.2*  3.4*   CL  113*  112*  112*   --    CO2  17*  20*  21   --    BUN  21*  22*  22*   --    CREA  1.57*  1.54*  1.34*   --    GLU  94  112*  105*   --    CA  8.4*  8.5  8.2*   --    MG  1.7   --   2.1  2.1   PHOS   --    --    --   3.0     No results for input(s): SGOT, GPT, ALT, AP, TBIL, TBILI, TP, ALB, GLOB, GGT, AML, LPSE in the last 72 hours.     No lab exists for component: AMYP, HLPSE  No results for input(s): INR, PTP, APTT in the last 72 hours. No lab exists for component: INREXT   No results for input(s): FE, TIBC, PSAT, FERR in the last 72 hours. No results found for: FOL, RBCF   No results for input(s): PH, PCO2, PO2 in the last 72 hours. No results for input(s): CPK, CKNDX, TROIQ in the last 72 hours.     No lab exists for component: CPKMB  Lab Results   Component Value Date/Time    Cholesterol, total 116 02/08/2018 06:05 PM    HDL Cholesterol 35 02/08/2018 06:05 PM    LDL, calculated 63.6 02/08/2018 06:05 PM    Triglyceride 87 02/08/2018 06:05 PM    CHOL/HDL Ratio 3.3 02/08/2018 06:05 PM     Lab Results   Component Value Date/Time    Glucose (POC) 116 (H) 03/11/2018 02:59 AM     No results found for: COLOR, APPRN, SPGRU, REFSG, SERGIO, PROTU, GLUCU, KETU, BILU, UROU, MARCELA, LEUKU, GLUKE, EPSU, BACTU, WBCU, RBCU, CASTS, UCRY      Medications Reviewed:     Current Facility-Administered Medications   Medication Dose Route Frequency    diphenhydrAMINE (BENADRYL) capsule 25 mg  25 mg Oral Q6H PRN    potassium chloride SR (KLOR-CON 10) tablet 30 mEq  30 mEq Oral DAILY    aspirin chewable tablet 81 mg  81 mg Oral DAILY    atorvastatin (LIPITOR) tablet 40 mg  40 mg Oral DAILY    cyanocobalamin (VITAMIN B12) tablet 1,000 mcg  1,000 mcg Oral DAILY    finasteride (PROSCAR) tablet 5 mg  5 mg Oral DAILY    losartan (COZAAR) tablet 12.5 mg  12.5 mg Oral DAILY    metoprolol succinate (TOPROL-XL) XL tablet 50 mg  50 mg Oral DAILY    multivitamin, tx-iron-ca-min (THERA-M w/ IRON) tablet 1 Tab  1 Tab Oral DAILY    tamsulosin (FLOMAX) capsule 0.4 mg  0.4 mg Oral DAILY    sodium chloride (NS) flush 5-10 mL  5-10 mL IntraVENous Q8H    sodium chloride (NS) flush 5-10 mL  5-10 mL IntraVENous PRN    nitroglycerin (NITROSTAT) tablet 0.4 mg  0.4 mg SubLINGual Q5MIN PRN    heparin (porcine) injection 5,000 Units  5,000 Units SubCUTAneous Q8H    bumetanide (BUMEX) injection 1 mg  1 mg IntraVENous BID ______________________________________________________________________  EXPECTED LENGTH OF STAY: 4d 12h  ACTUAL LENGTH OF STAY:          4                 Charissa Bumpers, MD

## 2018-03-13 NOTE — ROUTINE PROCESS
Bedside shift change report given to Shant Lainez (oncoming nurse) by Ivan Wong (offgoing nurse). Report included the following information SBAR, Kardex, Intake/Output, MAR, Accordion, Recent Results and Cardiac Rhythm A fib.

## 2018-03-13 NOTE — PROGRESS NOTES
Problem: Mobility Impaired (Adult and Pediatric)  Goal: *Acute Goals and Plan of Care (Insert Text)  Physical Therapy Goals  Initiated 3/11/2018  1. Patient will move from supine to sit and sit to supine  in bed with supervision/set-up within 7 day(s). 2.  Patient will transfer from bed to chair and chair to bed with supervision/set-up using the least restrictive device within 7 day(s). 3.  Patient will perform sit to stand with supervision/set-up within 7 day(s). 4.  Patient will ambulate with supervision/set-up for 50 feet with the least restrictive device within 7 day(s). 5.  Patient will ascend/descend 5 stairs with handrail(s) per home needs with minimal assistance/contact guard assist within 7 day(s). physical Therapy TREATMENT  including 6 minute walk test results  Patient: Sammi Rodrigues (15 y.o. male)  Date: 3/13/2018  Diagnosis: Chest pain  SOB (shortness of breath)  Shortness of breath SOB (shortness of breath)       Precautions: Fall, Bed Alarm (AMS)  Chart, physical therapy assessment, plan of care and goals were reviewed. ASSESSMENT:  Cleared for mobility by RN. Received pt supine in bed on 2L O2 reporting \"I'm just so itchy on my back\". No obvious rash noted. He remains intermittently pleasantly confused, however slightly more oriented throughout session today as compared to yesterday. Completed 6MWT in prep for discharge - demos essentially near non functional gait speed. Required up to min A while using RW, cues for increased R step lengths and improved foot clearance. SpO2 remained mid to high 90s while on 2L. Following seated rest break, pt completed 5x sit<>stands with incorporation of multi level large target reaching to facilitate increased strength and improved dynamic balance. Remains a very high falls risk at this time 2* cognitive and physical factors - continue to recommend discharge to SNF. Will follow.      Progression toward goals:  []    Improving appropriately and progressing toward goals  [x]    Improving slowly and progressing toward goals  []    Not making progress toward goals and plan of care will be adjusted     PLAN:  Patient continues to benefit from skilled intervention to address the above impairments. Continue treatment per established plan of care. Discharge Recommendations:  Ciaran Chacon  Further Equipment Recommendations for Discharge:  TBD     SUBJECTIVE:   Patient stated My back itches so bad.     OBJECTIVE DATA SUMMARY:   Critical Behavior:  Neurologic State: Alert, Confused  Orientation Level: Oriented to person, Oriented to place, Disoriented to situation, Disoriented to time  Cognition: Appropriate for age attention/concentration, Follows commands, Impaired decision making, Impulsive, Poor safety awareness  Safety/Judgement: Decreased insight into deficits, Decreased awareness of need for safety, Decreased awareness of need for assistance, Decreased awareness of environment  Functional Mobility Training:  Bed Mobility:     Supine to Sit: Minimum assistance     Transfers:  Sit to Stand: Contact guard assistance;Stand-by assistance  Stand to Sit: Stand-by assistance     Balance:  Sitting: Intact  Sitting - Static: Good (unsupported)  Sitting - Dynamic: Good (unsupported)  Standing: Impaired  Standing - Static: Good;Constant support  Standing - Dynamic : Fair  Ambulation/Gait Training:  Distance (ft): 90 Feet (ft)  Assistive Device: Gait belt;Walker, rolling  Ambulation - Level of Assistance: Minimal assistance;Contact guard assistance  Gait Abnormalities: Altered arm swing;Decreased step clearance;Shuffling gait; Step to gait  Base of Support: Center of gravity altered;Shift to left  Speed/Phyllis: Pace decreased (<100 feet/min); Shuffled  Step Length: Right shortened    6 MWT results:  Distance Walked in Feet (ft): 90 ft.    Pre Heart Rate: 83   Pre O2 Saturation: 93      Post Heart Rate: 103   Post O2 Saturation: 96   Assistive device used: Assistive Device: Gait belt;Walker, rolling       Normative data: 30-57 years old = 0 feet; Men 39-80 years old = 1889 feet; Women 3680 years tku=7436 feet  Modified 10 point Jd RPE scale utilized where 0 = no breathlessness at all; 10 = maximum exertion  Please refer to the flowsheet for any additional vital signs taken during this treatment.      Pain:  Pain Scale 1: Numeric (0 - 10)  Pain Intensity 1: 0      Activity Tolerance:   NAD    After treatment:   [x]    Patient left in no apparent distress sitting up in chair  []    Patient left in no apparent distress in bed  [x]    Call bell left within reach  [x]    Nursing notified  []    Caregiver present  [x]    Chair alarm activated    COMMUNICATION/COLLABORATION:   The patients plan of care was discussed with: Registered Nurse    Marcellus Pickett, PT, DPT   Time Calculation: 30 mins

## 2018-03-13 NOTE — PROGRESS NOTES
Problem: Self Care Deficits Care Plan (Adult)  Goal: *Acute Goals and Plan of Care (Insert Text)  Occupational Therapy Goals  Initiated 3/10/2018  1. Patient will perform lower body dressing with modified independence within 7 day(s). 2.  Patient will perform standing ADLs 5 mins with modified independence within 7 day(s). 3.  Patient will perform toilet transfers with modified independence within 7 day(s). 4.  Patient will perform all aspects of toileting with modified independence within 7 day(s). Occupational Therapy TREATMENT  Patient: Bonita Daniel (44 y.o. male)  Date: 3/13/2018  Diagnosis: Chest pain  SOB (shortness of breath)  Shortness of breath SOB (shortness of breath)       Precautions: Fall, Bed Alarm (AMS)  Chart, occupational therapy assessment, plan of care, and goals were reviewed. ASSESSMENT:  Based on the objective data below, the patient was received in the chair. Vitals stable. He participated in active and simulated self care, but declined mobility, choosing to eat his lunch when it arrived. Performance overall impacted by impaired reach to LEs, impaired standing balance, and need for assist for all self care and mobility. Recommend rehab, but note patient family choosing . Recommend if discharged to home, [facility] and 24/7 assist.  Progression toward goals:  []       Improving appropriately and progressing toward goals  [x]       Improving slowly and progressing toward goals  []       Not making progress toward goals and plan of care will be adjusted     PLAN:  Patient continues to benefit from skilled intervention to address the above impairments. Continue treatment per established plan of care. Discharge Recommendations:  Rehab versus  and 24/7 assist  Further Equipment Recommendations for Discharge:  none     SUBJECTIVE:   Patient stated I don't remember.  for name of place    OBJECTIVE DATA SUMMARY:   Cognitive/Behavioral Status:  Neurologic State: Alert  Orientation Level: Oriented to person  Cognition: Decreased attention/concentration; Follows commands  Perception: Appears intact  Perseveration: No perseveration noted  Safety/Judgement: Decreased insight into deficits; Decreased awareness of environment    Functional Mobility and Transfers for ADLs:  Bed Mobility:  Supine to Sit: Minimum assistance    Transfers:  Sit to Stand: Contact guard assistance;Stand-by assistance       Balance:  Sitting: Intact  Sitting - Static: Good (unsupported)  Sitting - Dynamic: Good (unsupported)  Standing: Impaired  Standing - Static: Good;Constant support  Standing - Dynamic : Fair    ADL Intervention:  Feeding  Feeding Assistance: Independent         Upper Body Bathing  Bathing Assistance: Supervision/set-up (in simulation)  Position Performed: Seated in chair    Lower Body Bathing  Perineal  : Moderate assistance (inferred from mobility)  Position Performed: Standing  Adaptive Equipment: Walker  Lower Body : Moderate assistance (in simulation)  Position Performed: Seated in chair         Lower Body Dressing Assistance  Pants With Elastic Waist: Moderate assistance (inferred from mobility)  Socks: Moderate assistance  Leg Crossed Method Used: No  Position Performed: Bending forward method;Standing;Seated in chair  Adaptive Equipment Used: Walker         Cognitive Retraining  Orientation Retraining: Place;Time  Organizing/Sequencing: Breaking task down  Following Commands: Follows one step commands/directions  Safety/Judgement: Decreased insight into deficits; Decreased awareness of environment          Activity Tolerance:   Fair  Please refer to the flowsheet for vital signs taken during this treatment.   After treatment:   [x] Patient left in no apparent distress sitting up in chair  [] Patient left in no apparent distress in bed  [x] Call bell left within reach  [x] Nursing notified  [] Caregiver present  [x] Bed alarm activated    COMMUNICATION/COLLABORATION:   The patients plan of care was discussed with: Registered Nurse    KAM Tanner  Time Calculation: 12 mins

## 2018-03-13 NOTE — DISCHARGE INSTRUCTIONS
Discharge Instructions       PATIENT ID: Lucie Wilson  MRN: [id number]   YOB: 1931    DATE OF ADMISSION: 3/9/2018  8:31 PM    DATE OF DISCHARGE: 3/13/2018    PRIMARY CARE PROVIDER: Home Wyatt MD     ATTENDING PHYSICIAN: Eva Vogel MD  DISCHARGING PROVIDER: Eva Vogel MD    To contact this individual call [phone] and ask the  to page. If unavailable ask to be transferred the Adult Hospitalist Department. DISCHARGE DIAGNOSES   Acute on chronic systolic heart failure, NYHA Class III (POA)  Hypokalemia (POA):  Hypomagnesia (POA):  Atrial fibrillation:  Recent diarrhea:  Hx of BPH:  CKD, stage III:    CONSULTATIONS: IP CONSULT TO HOSPITALIST  IP CONSULT TO CARDIOLOGY    PROCEDURES/SURGERIES: * No surgery found *    PENDING TEST RESULTS:   At the time of discharge the following test results are still pending: none    FOLLOW UP APPOINTMENTS:   Follow up with David Parekh MD on 3/16/2018 at 11:00 amd  Follow up with your Primary Care Physician in 2-3 days  Follow up with your Cardiologist at [address] [place] [address] in 2-3 days       ADDITIONAL CARE RECOMMENDATIONS:     DIET: Cardiac    ACTIVITY: Activity as tolerated    WOUND CARE: none    EQUIPMENT needed: none      DISCHARGE MEDICATIONS:   See Medication Reconciliation Form    · It is important that you take the medication exactly as they are prescribed. · Keep your medication in the bottles provided by the pharmacist and keep a list of the medication names, dosages, and times to be taken in your wallet. · Do not take other medications without consulting your doctor. NOTIFY YOUR PHYSICIAN FOR ANY OF THE FOLLOWING:   Fever over 101 degrees for 24 hours. Chest pain, shortness of breath, fever, chills, nausea, vomiting, diarrhea, change in mentation, falling, weakness, bleeding. Severe pain or pain not relieved by medications. Or, any other signs or symptoms that you may have questions about.       DISPOSITION:  x Home With:  x OT x PT x HH x RN       SNF/Inpatient Rehab/LTAC    Independent/assisted living    Hospice    Other:     CDMP Checked:   Yes x     PROBLEM LIST Updated:  Yes x       Signed:   Eva Vogel MD  3/13/2018  3:18 PM

## 2018-03-14 ENCOUNTER — PATIENT OUTREACH (OUTPATIENT)
Dept: CARDIOLOGY CLINIC | Age: 83
End: 2018-03-14

## 2018-03-14 NOTE — PROGRESS NOTES
Nurse navigator note - cardiology    READMISSION - 3/9-3/13/18 - CHF  ADMITTING [facility] COURSE: (copied from discharge 3/14/18)     A 81 yo male with PMH significant for chronic systolic heart failure, NYHA Class III - IV, atrial fibrillation, DVT, CKD stage 3, AAA, transaminitis, CVA, recent concussion, and BPH admitted on 3/9 due to CP, inability to lie flat, progressive SOB and cough with suspicion for acute CHF.  He was recently admitted last month for similar event. Acute on chronic systolic heart failure, NYHA Class III (POA):  -Pt is symptomatically improved. -Recent echo with EF 20% and diffuse hypokinesis -> no need to repeat  -on Bumex. Metoprolol and losartan  -patient and wife non compliant  -cardiologist on board     Atrial fibrillation:  -Rate controlled presently with Toprol-XL  -No OAC due to fall risk    CKD, stage III:  -Stable at baseline  Hx of TBI   -patient conscious and alert, disoriented,   -continue supportive care       Code status: Full  _______________________________________________________________________________________________  PT note 3/13/18  ASSESSMENT:  Cleared for mobility by RN. Received pt supine in bed on 2L O2 reporting \"I'm just so itchy on my back\". No obvious rash noted. He remains intermittently pleasantly confused, however slightly more oriented throughout session today as compared to yesterday. Completed 6MWT in prep for discharge - demos essentially near non functional gait speed. Required up to min A while using RW, cues for increased R step lengths and improved foot clearance. SpO2 remained mid to high 90s while on 2L. Following seated rest break, pt completed 5x sit<>stands with incorporation of multi level large target reaching to facilitate increased strength and improved dynamic balance. Remains a very high falls risk at this time 2* cognitive and physical factors - continue to recommend discharge to SNF. Will follow. ___________________________________________________________________________________________________________    3/14/18 - NN noted discharge - in preparation for call to home - call to [address] re: resumption of care -  Reached the nurse there who said she cannot discuss the case with [facility] care team.  No further discussion with [facility] in this regard. No follow up call - pt has scheduled appt for 3/16 with Dr. Joce Hagen - provider was notified of the above. [facility] by Kwaku rose with Dr. Marcy Bauer.     Abe Prakash RN , [place] , [facility]   [facility]  [phone]

## 2018-04-24 ENCOUNTER — TELEPHONE (OUTPATIENT)
Dept: CARDIAC REHAB | Age: 83
End: 2018-04-24

## 2018-04-24 NOTE — TELEPHONE ENCOUNTER
4/24/2018 Cardiac Rehab: Called Mr. Fito White to discuss participation in the Cardiac Rehab Program following diagnosis of SHF on 2/8/18. Left voicemail message. Leo Burr RN     4/24/18: Patient's family member returned phone call. She said Fito White was not interested in Cardiac Rehab and hung up the phone.  Leo Burr RN

## 2018-11-23 ENCOUNTER — EXTERNAL NURSING HOME DOCUMENTATION (OUTPATIENT)
Dept: FAMILY MEDICINE CLINIC | Age: 83
End: 2018-11-23

## 2018-11-23 DIAGNOSIS — I50.22 CHRONIC SYSTOLIC CONGESTIVE HEART FAILURE (HCC): Primary | ICD-10-CM

## 2018-11-23 DIAGNOSIS — F01.50 VASCULAR DEMENTIA WITHOUT BEHAVIORAL DISTURBANCE (HCC): ICD-10-CM

## 2018-11-23 DIAGNOSIS — R09.89 CARDIAC LV EJECTION FRACTION 10-20%: ICD-10-CM

## 2018-11-23 DIAGNOSIS — I71.40 ABDOMINAL AORTIC ANEURYSM (AAA) WITHOUT RUPTURE: ICD-10-CM

## 2018-11-23 DIAGNOSIS — N18.30 CKD (CHRONIC KIDNEY DISEASE) STAGE 3, GFR 30-59 ML/MIN (HCC): ICD-10-CM

## 2018-11-23 DIAGNOSIS — Z87.820 HISTORY OF TRAUMATIC BRAIN INJURY: ICD-10-CM

## 2018-11-23 DIAGNOSIS — I48.20 CHRONIC ATRIAL FIBRILLATION (HCC): ICD-10-CM

## 2018-11-23 NOTE — PROGRESS NOTES
History of Present Illness:   Vahid Dodge is an 80 y.o. black male who was admitted to [facility] from [place] three weeks ago after hospitalization. The patient has been transferred to my service. He has a number of chronic medical issues including CHF with EF of 15% as well as advanced dementia. The patient is followed by [facility]. He is a poor historian. He answers simple questions. He is denying any pain or dyspnea. He tells me he is feeling okay. The staff do not report any significant issues. PMH:  CHF with low EF, dementia, TBI, atrial fibrillation, CKD, AAA. Allergies:  None. SH:  No tobacco or alcohol use. He is , but his wife is not his POA. It is a  at this time. Medications:  See NH list including Metoprolol, Aspirin, Lipitor, Flomax, Proscar, Torsemide, Multivitamins and Vitamin B-12. Physical Examination:   GENERAL:  Vital signs stable. Afebrile per nurse's notes. Pleasant elderly man sitting in chair, A&O to his name, does not know the place or date. He tells me he is 62years old. HEENT:  Unremarkable. NECK:  Supple, no JVD, thyromegaly or bruits. HEART:  Regular with some ectopy, distant sounds. LUNGS:  Diminished sounds, but clear. ABDOMEN:  Soft, nontender. Normal bowel sounds. EXTREMITIES:  No significant edema. DJD changes. NEURO:  Generalized weakness, otherwise nonfocal.    Impression:  CHF with low EF. Remote history of TBI. Chronic atrial fibrillation. CKD. AAA. Debility. Plan:  Continue current medications. DC Lipitor and Multivitamins which are of no benefit to this patient at this point. Focus on comfort care. Continue hospice. DNR. PT, OT and ST. Full code.

## 2018-11-25 PROBLEM — I50.22 CHRONIC SYSTOLIC CONGESTIVE HEART FAILURE (HCC): Status: ACTIVE | Noted: 2018-11-25

## 2018-11-25 PROBLEM — R09.89 CARDIAC LV EJECTION FRACTION 10-20%: Status: ACTIVE | Noted: 2018-11-25

## 2018-11-25 PROBLEM — I71.40 ABDOMINAL AORTIC ANEURYSM (AAA) WITHOUT RUPTURE: Status: ACTIVE | Noted: 2018-11-25

## 2018-11-25 PROBLEM — F01.50 VASCULAR DEMENTIA WITHOUT BEHAVIORAL DISTURBANCE (HCC): Status: ACTIVE | Noted: 2018-11-25

## 2018-11-25 PROBLEM — Z87.820 HISTORY OF TRAUMATIC BRAIN INJURY: Status: ACTIVE | Noted: 2018-11-25

## 2018-11-25 PROBLEM — I48.20 CHRONIC ATRIAL FIBRILLATION (HCC): Status: ACTIVE | Noted: 2018-11-25

## 2018-11-25 PROBLEM — N18.30 CKD (CHRONIC KIDNEY DISEASE) STAGE 3, GFR 30-59 ML/MIN (HCC): Status: ACTIVE | Noted: 2018-11-25

## 2018-11-28 ENCOUNTER — EXTERNAL NURSING HOME DOCUMENTATION (OUTPATIENT)
Dept: FAMILY MEDICINE CLINIC | Age: 83
End: 2018-11-28

## 2018-11-28 DIAGNOSIS — I48.91 ATRIAL FIBRILLATION, UNSPECIFIED TYPE (HCC): ICD-10-CM

## 2018-11-28 DIAGNOSIS — N40.0 BENIGN PROSTATIC HYPERPLASIA, UNSPECIFIED WHETHER LOWER URINARY TRACT SYMPTOMS PRESENT: ICD-10-CM

## 2018-11-28 DIAGNOSIS — I50.9 CONGESTIVE HEART FAILURE, UNSPECIFIED HF CHRONICITY, UNSPECIFIED HEART FAILURE TYPE (HCC): Primary | ICD-10-CM

## 2018-11-28 DIAGNOSIS — N18.9 CHRONIC KIDNEY DISEASE, UNSPECIFIED CKD STAGE: ICD-10-CM

## 2018-11-28 DIAGNOSIS — I10 ESSENTIAL HYPERTENSION: ICD-10-CM

## 2018-11-28 DIAGNOSIS — F03.90 DEMENTIA WITHOUT BEHAVIORAL DISTURBANCE, UNSPECIFIED DEMENTIA TYPE: ICD-10-CM
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
## 2018-12-05 ENCOUNTER — EXTERNAL NURSING HOME DOCUMENTATION (OUTPATIENT)
Dept: FAMILY MEDICINE CLINIC | Age: 83
End: 2018-12-05

## 2018-12-05 DIAGNOSIS — I48.91 ATRIAL FIBRILLATION, UNSPECIFIED TYPE (HCC): ICD-10-CM

## 2018-12-05 DIAGNOSIS — N40.0 BENIGN PROSTATIC HYPERPLASIA, UNSPECIFIED WHETHER LOWER URINARY TRACT SYMPTOMS PRESENT: ICD-10-CM

## 2018-12-05 DIAGNOSIS — I10 ESSENTIAL HYPERTENSION: ICD-10-CM

## 2018-12-05 DIAGNOSIS — F03.90 DEMENTIA WITHOUT BEHAVIORAL DISTURBANCE, UNSPECIFIED DEMENTIA TYPE: ICD-10-CM

## 2018-12-05 DIAGNOSIS — L89.310 PRESSURE INJURY OF RIGHT BUTTOCK, UNSTAGEABLE (HCC): ICD-10-CM

## 2018-12-05 DIAGNOSIS — L89.320 PRESSURE INJURY OF LEFT BUTTOCK, UNSTAGEABLE (HCC): ICD-10-CM

## 2018-12-05 DIAGNOSIS — N18.9 CHRONIC KIDNEY DISEASE, UNSPECIFIED CKD STAGE: ICD-10-CM

## 2018-12-05 DIAGNOSIS — I50.9 CONGESTIVE HEART FAILURE, UNSPECIFIED HF CHRONICITY, UNSPECIFIED HEART FAILURE TYPE (HCC): Primary | ICD-10-CM

## 2018-12-05 DIAGNOSIS — Z87.820 HISTORY OF TRAUMATIC BRAIN INJURY: ICD-10-CM
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
## 2018-12-14 ENCOUNTER — TELEPHONE (OUTPATIENT)
Dept: INTERNAL MEDICINE CLINIC | Age: 83
End: 2018-12-14

## 2018-12-14 NOTE — TELEPHONE ENCOUNTER
----- Message from Tasha Fitzpatrick sent at 12/14/2018 10:30 AM EST -----  Regarding: Dr. Rainer Strong from [facility] requesting a call back to verify if VADMAS for was received. Best contact [phone].

## 2018-12-18 ENCOUNTER — TELEPHONE (OUTPATIENT)
Dept: INTERNAL MEDICINE CLINIC | Age: 83
End: 2018-12-18
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
## 2018-12-19 ENCOUNTER — EXTERNAL NURSING HOME DOCUMENTATION (OUTPATIENT)
Dept: FAMILY MEDICINE CLINIC | Age: 83
End: 2018-12-19

## 2018-12-19 DIAGNOSIS — F03.90 DEMENTIA WITHOUT BEHAVIORAL DISTURBANCE, UNSPECIFIED DEMENTIA TYPE: ICD-10-CM

## 2018-12-19 DIAGNOSIS — I95.9 HYPOTENSION, UNSPECIFIED HYPOTENSION TYPE: ICD-10-CM

## 2018-12-19 DIAGNOSIS — I50.9 CONGESTIVE HEART FAILURE, UNSPECIFIED HF CHRONICITY, UNSPECIFIED HEART FAILURE TYPE (HCC): ICD-10-CM

## 2018-12-19 DIAGNOSIS — R40.0 DROWSINESS: Primary | ICD-10-CM

## 2018-12-19 DIAGNOSIS — N18.9 CHRONIC KIDNEY DISEASE, UNSPECIFIED CKD STAGE: ICD-10-CM

## 2018-12-19 DIAGNOSIS — I48.91 ATRIAL FIBRILLATION, UNSPECIFIED TYPE (HCC): ICD-10-CM
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
## 2018-12-21 ENCOUNTER — EXTERNAL NURSING HOME DOCUMENTATION (OUTPATIENT)
Dept: FAMILY MEDICINE CLINIC | Age: 83
End: 2018-12-21

## 2018-12-21 DIAGNOSIS — I48.91 ATRIAL FIBRILLATION, UNSPECIFIED TYPE (HCC): ICD-10-CM

## 2018-12-21 DIAGNOSIS — L89.320 PRESSURE INJURY OF LEFT BUTTOCK, UNSTAGEABLE (HCC): ICD-10-CM

## 2018-12-21 DIAGNOSIS — L89.310 PRESSURE INJURY OF RIGHT BUTTOCK, UNSTAGEABLE (HCC): ICD-10-CM

## 2018-12-21 DIAGNOSIS — I10 ESSENTIAL HYPERTENSION: ICD-10-CM

## 2018-12-21 DIAGNOSIS — I50.9 CONGESTIVE HEART FAILURE, UNSPECIFIED HF CHRONICITY, UNSPECIFIED HEART FAILURE TYPE (HCC): ICD-10-CM

## 2018-12-21 DIAGNOSIS — N18.9 CHRONIC KIDNEY DISEASE, UNSPECIFIED CKD STAGE: ICD-10-CM

## 2018-12-21 DIAGNOSIS — Z87.820 HISTORY OF TRAUMATIC BRAIN INJURY: ICD-10-CM

## 2018-12-21 DIAGNOSIS — F03.90 DEMENTIA WITHOUT BEHAVIORAL DISTURBANCE, UNSPECIFIED DEMENTIA TYPE: Primary | ICD-10-CM

## 2018-12-21 DIAGNOSIS — N40.0 BENIGN PROSTATIC HYPERPLASIA, UNSPECIFIED WHETHER LOWER URINARY TRACT SYMPTOMS PRESENT: ICD-10-CM
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
## 2023-07-05 NOTE — PROGRESS NOTES
2023         RE: John Dueñas  [address]  [place] [address]        Dear Colleague,    Thank you for referring your patient, John Dueñas, to the [facility] [place]. Please see a copy of my visit note below.    NEUROLOGY FOLLOW UP VISIT  NOTE       [facility] [place]  [address] [place] [address]  Tel: [phone]  Fax: [phone]  [email or web address]     John Dueñas,  1944, MRN [id number]  PCP: Malcolm Sun  Date: 2023      ASSESSMENT & PLAN     Visit Diagnosis  1. Vascular dementia without behavioral disturbance (H)     Vascular dementia  79-year-old male with history of prior ICH, HTN, HLD with vascular dementia without behavioral disturbances.  His MoCA continues to decline and he scored 13/30 today.  I have recommended:    1.  Continue Aricept 10 mg daily and add Namenda 5 mg daily, gradually increasing it to 10 mg twice daily  2.  Check hepatic profile  3.  Patient was told to stop driving  4.  Continue citalopram  5.  He was told to remain socially active, do mental exercises and eat Mediterranean diet  6.  Add vitamin E 1000 units twice daily    Thank you again for this referral, please feel free to contact me if you have any questions.    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  He scored 15/30 on MoCA during his last visit and was continued on Aricept.  His MoCA score is 13/20 today previously patient was on Keppra following his intracerebral hemorrhage but as he remained symptom-free it was discontinued.  According to patient his condition is stable.  Wife feels there has been a steady decline.  He  ADULT PROTOCOL: JET AEROSOL ASSESSMENT    Patient  Pauline Andrews     80 y.o.   male     2/11/2018  11:03 PM    Breath Sounds Pre Procedure: Right Breath Sounds: Diminished                               Left Breath Sounds: Diminished    Breath Sounds Post Procedure: Right Breath Sounds: Diminished                                 Left Breath Sounds: Diminished    Breathing pattern: Pre procedure Breathing Pattern: Regular          Post procedure Breathing Pattern: Regular    Heart Rate: Pre procedure Pulse: 107           Post procedure Pulse: 107  Resp Rate: Pre procedure Respirations: 16           Post procedure Respirations: 18  Oxygen: O2 Device: Room air   RA     Changed: NO    SpO2: Pre procedure SpO2: 97 %   without oxygen              Post procedure SpO2: 94 %  without oxygen    Nebulizer Therapy: Current medications Aerosolized Medications: DuoNeb      Changed: YES to TID Duoneb      Problem List:   Patient Active Problem List   Diagnosis Code    SOB (shortness of breath) R06.02       Respiratory Therapist: Violeta Shipley RT does struggle with depression and takes citalopram.  He continues to drive although I had advised previously to stop driving.    PQRS IN DEMENTIA   Measure #47: Advanced care plan or surrogate decision maker documented: YES   Advanced care plan discussed but patient did not wish or was not able to name a surrogate decision maker: YES/NO/ NA     Measure #280: Staging of dementia by MMSE at least once within a 12 month period   Mild dementia: MMSE > 18   Moderate dementia: MMSE 10-18   Severe dementia: MMSE<10     Measure #281: Cognitive assessment performed and results reviewed at least once within 12 month period   Mini-Mental state examination (MMSE), [facility] mental status examination (UMS) or formal neuropsychological evaluation   MoCA score 13/25    Measure #282: Functional status assessment performed and results reviewed at least once within 12 month period   Able to perform instrumental activities of daily living (IADL): YES   Able to perform basic activities of daily living(ADL): YES    Measure #283: Neuropsychiatric symptom assessment performed and results reviewed at least once within 12 months   Dementia signs and symptoms (DSS) scale:   Neuropsychiatric symptoms include, agitation, wandering, purposeless hyperactivity, verbal or physical aggressiveness, resistive numbness with care, apathy, impulsiveness, socially inappropriate behaviors, eating disturbances, sleep problems, repetitive behavior, anxiety, dysphoria, euphoria, irritability, delusions, hallucinations, paranoia     Measure #284: Management of neuropsychiatric symptoms at least once within 12 month period   One or more neuropsychiatric symptoms: YES  Neuropsychiatric intervention ordered:NO     Measure #285: Screening for depressive symptoms YES    Measure #286: Counseling regarding safety concerns,at least once within 12 month period: YES  Referring for home safety evaluation: NO   Educational material provided: YES  Safety  concerns include, fall risk, gait instability, medication management, financial management, home safety risk from cooking/smoking, physical expression to self or others, wandering, access to firearms or other weapons, being left alone in home, inability to respond to emergencies, driving, operation of hazardous equipment, abuse or neglect.     Measure #287: Counseling regarding risk of driving,at least once within 12 month period: YES   Drivers evaluation NO     Measure #288: Caregiver education and support, at least once within 12 month period: NO   Advised caregiver that he/she is at increased risk of serious illness including heart condition, hypertension, respiratory disease and circulatory diseases.        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    Surgical History:  He  has a past surgical history that includes tonsillectomy and Tonsillectomy (1950).     SOCIAL HISTORY     Reviewed, and he  reports that he has never smoked. He has never used smokeless tobacco. He reports that he does not currently use alcohol.     FAMILY HISTORY     Reviewed, and family history includes Chronic Obstructive Pulmonary Disease in his father; Diabetes in his maternal grandfather; Heart Disease in his brother; Multiple myeloma in his mother.     ALLERGIES     Allergies   Allergen  Reactions     Ether Other (See Comments)     Lactose Other (See Comments)         REVIEW OF SYSTEMS     A 12 point review of system was performed and was negative except as outlined in the history of present illness.     HOME MEDICATIONS     Current Outpatient Rx   Medication Sig Dispense Refill     acetaminophen (TYLENOL) 500 MG tablet Take 1 tablet by mouth       calcium citrate-vitamin D (CITRACAL) 315-250 MG-UNIT TABS per tablet Take 2 tablets by mouth       cholecalciferol (VITAMIN D-1000 MAX ST) 25 MCG (1000 UT) TABS Take 1,000 Units by mouth       donepezil (ARICEPT) 10 MG tablet Take 1 tablet (10 mg) by mouth At Bedtime 90 tablet 3     escitalopram (LEXAPRO) 10 MG tablet Take 10 mg by mouth At Bedtime        Glucosamine-Chondroit-Vit C-Mn (GLUCOSAMINE CHONDROITINOITIN COMPLEX) CAPS        lisinopril (ZESTRIL) 10 MG tablet TAKE 1 TABLET BY MOUTH EVERY EVENING       melatonin 5 MG tablet        [START ON 8/5/2023] memantine (NAMENDA) 10 MG tablet Take 1 tablet (10 mg) by mouth 2 times daily 180 tablet 3     memantine (NAMENDA) 5 MG tablet 1 PO every day x 7 days, then 1 PO BID x 7 days, then 2 PO Q am & 1 PO at bedtime x 7 days, then 2 PO BID thereafter 70 tablet 0     metoprolol succinate ER (TOPROL-XL) 50 MG 24 hr tablet Take 50 mg by mouth       multivitamin w/minerals (THERA-VIT-M) tablet Take 1 tablet by mouth       senna-docusate (SENOKOT-S/PERICOLACE) 8.6-50 MG tablet Take 2 tablets by mouth       tamsulosin (FLOMAX) 0.4 MG capsule Take 0.4 mg by mouth       tiZANidine (ZANAFLEX) 4 MG tablet TAKE 1 TABLET BY MOUTH AT BEDTIME AS NEEDED FOR MUSCLE SPASM       triamcinolone (KENALOG) 0.025 % cream Apply topically 2 times daily       vitamin B-12 (CYANOCOBALAMIN) 1000 MCG tablet Take 1,000 mcg by mouth        vitamin E (TOCOPHEROL) 1000 units (450 mg) CAPS capsule Take 1 capsule (1,000 Units) by mouth 2 times daily 180 capsule 3     magnesium oxide (MAG-OX) 400 MG tablet Take 1 tablet by mouth daily at 2 pm  "          PHYSICAL EXAM     Vital signs  /66 (BP Location: Right arm, Patient Position: Sitting)   Pulse 57   Ht 1.778 m (5' 10\")   Wt 74.8 kg (165 lb)   BMI 23.68 kg/m      Weight:   165 lbs 0 oz  Wolverine Cognitive Assessment:    Wolverine Cognitive Assessment (MOCA)  Visuospatial/Executive : 3  Namin  Attention - Digits: 1  Attention - Letters: 0  Attention - Subtraction: 0  Language - Repeat: 0  Language - Fluency : 0  Abstraction: 2  Delayed Recall: 2  Orientation: 3  Education: 0  MOCA Score: 13  Administered by: : Aura Felipe CMA     Chavo Cognitive Assessment Score:  MOCA Score: .      GENERAL PHYSICAL EXAM: Patient is alert and oriented x 4 in no acute distress. Neck was supple, no carotid bruits, thyromegaly, lymphadenopathy or JVD noted.   NEUROLOGICAL EXAM:  Patient is alert and oriented times 3 he can tell me his name date and place.  He scored 13/30 on MoCA today compared to 15/30 last year.  He can tell me the name of the president but he struggles with serial 7.  Pupil equal round and reactive face symmetrical tongue midline.  He has right pronator drift.  Strength on the right 5/5 on the left 5/5.  Reflexes 2+ on the right 1+ on the left he has some dysmetria on finger-nose testing he has a wide-based gait and somewhat unstable with decreased arm swing       PERTINENT DIAGNOSTIC STUDIES     Following studies were reviewed:     CT BRAIN 2020  1.  4.9 x 5.4 x 5.7 cm intraparenchymal hemorrhage centered in the left parietal lobe and extending to the left periatrial region.  There is a small surrounding rim of vasogenic type edema. Consider contrast enhanced brain MRI for further evaluation if   the patient is clinically able to get MRI.  2.  It does have local mass effect with partial effacement of the atrium of the left lateral ventricle. 2.4 mm midline shift to the right at the level of the lateral ventricles.   3.  3.4 mm subdural hematoma along the posterior left " frontal convexity.     MRI, MRA brain 2/1/2021  MRI BRAIN:  1. No finding for acute infarct, acute hemorrhage, or mass.     2. Area of chronic encephalomalacic change, hemosiderin staining, and gliosis is seen along the left frontoparietal junction corresponding to the site of prior parenchymal hemorrhage.  2. Moderate to marked dilatation of the lateral ventricles, stable compared to prior.     [facility] [place] [facility]:  1. No vascular cutoff from aneurysm, or flow-limiting stenosis.     MRI BRAIN 12/1/2020  1.  Chronic parenchymal hematoma within the left temporoparietal lobes with encephalomalacia and volume loss. Irregular central T2 hypointensity and enhancement likely relates to scarring, no definite underlying mass. Continued follow-up recommended to  establish longer-term stability. Consider vascular imaging if not previously performed to more definitively exclude vascular etiology of hemorrhage.     MRI BRAIN 8/31/2020  1.  Previously described intraparenchymal hematoma in the left parietal lobe with extension to the left temporoparietal region has markedly decreased in size since previous, now measuring 3.0 x 1.2 cm in AP x transverse dimensions. This primarily   involves the lateral left parietal lobe with slight extension to the left frontoparietal region and is accompanied by hemosiderin staining extending into the left temporoparietal region. While there is a small amount of FLAIR hyperintense diffusion   restriction compatible with acute to very early subacute infarct along the anterior and medial margin of the above-described residual intraparenchymal hemorrhage in the left parietal lobe, this presumably relates to an evolving parenchymal injury from   the above-described intraparenchymal hematoma and is accompanied by a background of adjacent FLAIR hyperintense presumed gliosis. Without contrast, evaluation for an abnormal underlying mass is limited. Follow-up contrast-enhanced brain MRI after    resolution of the above-described residual intraparenchymal hemorrhage will be of benefit to exclude an underlying mass.     2.  Previously described subarachnoid hemorrhage and intraventricular extension of hemorrhage have resolved in the interim.     3.  Hemosiderin staining in the left parietal lobe and left temporoparietal region is accompanied by a small amount of hemosiderin staining along the atrium and occipital horn of the left lateral ventricle.     4.  Unchanged age-related changes, as above.     MRI BRAIN 6/13/2020  1.  Large left parietal parenchymal hemorrhage again noted. It is grossly unchanged in size. There is new intraventricular extension layering in the occipital horns and left atrium lateral ventricle compared to the prior study. Temporal horns are   slightly more prominent on the prior examination. Close interval follow-up suggested. Overall mass effect and midline shift is similar to the prior examination.     2.  There is multifocal stippled and somewhat nodular enhancement around the periphery of the parietal hemorrhage as detailed above. No definite focal masslike enhancement is identified. This may represent vascular structures and can occasionally be seen   in active bleeding.     3.  Stable thin left convexity subdural hematoma.     4.  Small volume subarachnoid hemorrhage over the right frontal operculum and temporal convexity.     5.  Overall, the constellation of findings is nonspecific. Consider amyloid angiopathy. Given the stable abnormal enhancement, a component of cerebral amyloid angiopathy related inflammation is not excluded though considered less likely.     EEG 4/19/2021  Diffusely slow more so on the left side.  No sharp activity arrhythmic discharges seen to suggest seizures     EEG  6/14/2020  This is a normal EEG during wakefulness and drowsiness except due to mild background dysrhythmia and the EEG being limited due to motion artifact.  EEG is not suggestive of  epilepsy.  Further clinical correlation is needed.     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 Consider a trial of a cholinesterase inhibitor (e.g., donepezil) if not medically contraindicated, as this medication has been shown to benefit individuals with vascular cognitive impairment.     2) Ongoing participation in speech therapy (cognitive rehabilitation) is strongly encouraged. The patient appears to be benefiting from this in many ways.     3) Additional emotional support is also strongly recommended. I have provided Mr. Dueñas and his wife with various support groups in the community (for survivors and/or loved ones). I have also discussed with him a referral to see our psychologist for individual psychotherapy to help him process, cope, and even come to accept some of the losses he has endured as a result of his CVAs. Mr. Dueñas is not interested in either of these suggestions at this time, but asked that I mail him these resources in case he changes his mind. He does get some additional support from his Orthodox.     4) Consider an adjustment to his antidepressant medication regimen (either increasing the dosage or trying an alternative) to help better manage his emotional symptoms.     5) Mr. Dueñas is encouraged to adhere closely to his medication regimen in general, in order to keep his cerebrovascular risk factors (e.g., high blood pressure and high cholesterol) well managed. This may help to prevent future CVAs and additional cognitive decline.     6) If not already done, completion of paperwork for advance directives and assignment of healthcare and financial Power of  should be considered at this time.     7) Mr. Dueñas is in a living situation in which most of his complex daily activities  (such as driving and financial management) are arranged and managed for him. This all remains appropriate. Thus, he is quite fortunate in that, despite the evidence of cognitive impairment, his current independent living situation appears to be working well for him.     8) Mr. Dueñas is encouraged to remain physically, socially, and mentally active in order to optimize his brain health.     9) Neuropsychological follow-up is recommended in approximately 12 months (or as clinically indicated) in order to monitor his cognitive status and update recommendations. The current test data can be used as a baseline to which future comparisons can be made       PERTINENT LABS  Following labs were reviewed:  No visits with results within 3 Month(s) from this visit.   Latest known visit with results is:   Lab on 07/01/2022   Component Date Value Ref Range Status     Bilirubin Total 07/01/2022 0.8  0.0 - 1.0 mg/dL Final     Bilirubin Direct 07/01/2022 0.3  <=0.5 mg/dL Final     Protein Total 07/01/2022 5.8 (L)  6.0 - 8.0 g/dL Final     Albumin 07/01/2022 3.7  3.5 - 5.0 g/dL Final     Alkaline Phosphatase 07/01/2022 64  45 - 120 U/L Final     AST 07/01/2022 28  0 - 40 U/L Final     ALT 07/01/2022 41  0 - 45 U/L Final         Total time spent for face to face visit, reviewing labs/imaging studies, counseling and coordination of care was: 30 Minutes spent on the date of the encounter doing chart review, review of outside records, review of test results, interpretation of tests, patient visit, documentation and discussion with family       This note was dictated using voice recognition software.  Any grammatical or context distortions are unintentional and inherent to the software.    Orders Placed This Encounter   Procedures     Hepatic function panel      New Prescriptions    MEMANTINE (NAMENDA) 10 MG TABLET    Take 1 tablet (10 mg) by mouth 2 times daily    MEMANTINE (NAMENDA) 5 MG TABLET    1 PO every day x 7 days, then 1 PO BID  x 7 days, then 2 PO Q am & 1 PO at bedtime x 7 days, then 2 PO BID thereafter    VITAMIN E (TOCOPHEROL) 1000 UNITS (450 MG) CAPS CAPSULE    Take 1 capsule (1,000 Units) by mouth 2 times daily     Modified Medications    Modified Medication Previous Medication    DONEPEZIL (ARICEPT) 10 MG TABLET donepezil (ARICEPT) 10 MG tablet       Take 1 tablet (10 mg) by mouth At Bedtime    TAKE 1 TABLET BY MOUTH EVERYDAY AT BEDTIME                     Again, thank you for allowing me to participate in the care of your patient.        Sincerely,        Tom Rivera MD